# Patient Record
Sex: MALE | Race: WHITE | Employment: OTHER | URBAN - METROPOLITAN AREA
[De-identification: names, ages, dates, MRNs, and addresses within clinical notes are randomized per-mention and may not be internally consistent; named-entity substitution may affect disease eponyms.]

---

## 2017-01-12 DIAGNOSIS — F41.8 DEPRESSION WITH ANXIETY: ICD-10-CM

## 2017-01-12 RX ORDER — IMIPRAMINE HCL 25 MG
TABLET ORAL
Qty: 90 TABLET | Refills: 0 | Status: SHIPPED | OUTPATIENT
Start: 2017-01-12 | End: 2017-05-03 | Stop reason: SDUPTHER

## 2017-02-13 ENCOUNTER — TELEPHONE (OUTPATIENT)
Dept: CARDIOLOGY | Age: 72
End: 2017-02-13

## 2017-05-03 ENCOUNTER — OFFICE VISIT (OUTPATIENT)
Dept: FAMILY MEDICINE CLINIC | Age: 72
End: 2017-05-03

## 2017-05-03 VITALS
HEART RATE: 59 BPM | TEMPERATURE: 97.9 F | HEIGHT: 68 IN | WEIGHT: 165.2 LBS | RESPIRATION RATE: 16 BRPM | OXYGEN SATURATION: 98 % | BODY MASS INDEX: 25.04 KG/M2 | SYSTOLIC BLOOD PRESSURE: 110 MMHG | DIASTOLIC BLOOD PRESSURE: 70 MMHG

## 2017-05-03 DIAGNOSIS — K59.09 OTHER CONSTIPATION: ICD-10-CM

## 2017-05-03 DIAGNOSIS — R73.01 IMPAIRED FASTING GLUCOSE: ICD-10-CM

## 2017-05-03 DIAGNOSIS — I25.83 CORONARY ARTERY DISEASE DUE TO LIPID RICH PLAQUE: ICD-10-CM

## 2017-05-03 DIAGNOSIS — I25.10 CORONARY ARTERY DISEASE DUE TO LIPID RICH PLAQUE: ICD-10-CM

## 2017-05-03 DIAGNOSIS — I65.23 BILATERAL CAROTID ARTERY STENOSIS: ICD-10-CM

## 2017-05-03 DIAGNOSIS — Z96.659 S/P KNEE REPLACEMENT: ICD-10-CM

## 2017-05-03 DIAGNOSIS — F41.9 ANXIETY: ICD-10-CM

## 2017-05-03 DIAGNOSIS — Z13.0 SCREENING FOR DEFICIENCY ANEMIA: ICD-10-CM

## 2017-05-03 DIAGNOSIS — F33.0 MILD RECURRENT MAJOR DEPRESSION (HCC): ICD-10-CM

## 2017-05-03 DIAGNOSIS — Z13.29 SCREENING FOR THYROID DISORDER: ICD-10-CM

## 2017-05-03 DIAGNOSIS — N40.0 BENIGN NON-NODULAR PROSTATIC HYPERPLASIA WITHOUT LOWER URINARY TRACT SYMPTOMS: ICD-10-CM

## 2017-05-03 DIAGNOSIS — G20 PARKINSON DISEASE (HCC): ICD-10-CM

## 2017-05-03 DIAGNOSIS — K22.70 BARRETT'S ESOPHAGUS WITHOUT DYSPLASIA: ICD-10-CM

## 2017-05-03 DIAGNOSIS — R53.81 PHYSICAL DECONDITIONING: ICD-10-CM

## 2017-05-03 DIAGNOSIS — I10 ESSENTIAL HYPERTENSION: Primary | ICD-10-CM

## 2017-05-03 DIAGNOSIS — Z13.220 LIPID SCREENING: ICD-10-CM

## 2017-05-03 PROCEDURE — 3017F COLORECTAL CA SCREEN DOC REV: CPT | Performed by: FAMILY MEDICINE

## 2017-05-03 PROCEDURE — G8598 ASA/ANTIPLAT THER USED: HCPCS | Performed by: FAMILY MEDICINE

## 2017-05-03 PROCEDURE — 99214 OFFICE O/P EST MOD 30 MIN: CPT | Performed by: FAMILY MEDICINE

## 2017-05-03 PROCEDURE — 1123F ACP DISCUSS/DSCN MKR DOCD: CPT | Performed by: FAMILY MEDICINE

## 2017-05-03 PROCEDURE — G8427 DOCREV CUR MEDS BY ELIG CLIN: HCPCS | Performed by: FAMILY MEDICINE

## 2017-05-03 PROCEDURE — G8420 CALC BMI NORM PARAMETERS: HCPCS | Performed by: FAMILY MEDICINE

## 2017-05-03 PROCEDURE — 1036F TOBACCO NON-USER: CPT | Performed by: FAMILY MEDICINE

## 2017-05-03 PROCEDURE — 4040F PNEUMOC VAC/ADMIN/RCVD: CPT | Performed by: FAMILY MEDICINE

## 2017-05-03 RX ORDER — IMIPRAMINE HCL 25 MG
TABLET ORAL
Qty: 90 TABLET | Refills: 0 | Status: SHIPPED | OUTPATIENT
Start: 2017-05-03 | End: 2017-05-06 | Stop reason: SDUPTHER

## 2017-05-03 ASSESSMENT — ENCOUNTER SYMPTOMS
COUGH: 0
NAUSEA: 0
ANAL BLEEDING: 0
CHOKING: 0
BLOOD IN STOOL: 0
SHORTNESS OF BREATH: 0
VOMITING: 0
DIARRHEA: 0
CHEST TIGHTNESS: 0
RECTAL PAIN: 0
WHEEZING: 0
APNEA: 0
CONSTIPATION: 0
ABDOMINAL DISTENTION: 0
STRIDOR: 0
ABDOMINAL PAIN: 0

## 2017-05-05 DIAGNOSIS — Z13.29 SCREENING FOR THYROID DISORDER: ICD-10-CM

## 2017-05-05 DIAGNOSIS — Z13.220 LIPID SCREENING: ICD-10-CM

## 2017-05-05 DIAGNOSIS — Z13.0 SCREENING FOR DEFICIENCY ANEMIA: ICD-10-CM

## 2017-05-05 DIAGNOSIS — I10 ESSENTIAL HYPERTENSION: ICD-10-CM

## 2017-05-05 DIAGNOSIS — R73.01 IMPAIRED FASTING GLUCOSE: ICD-10-CM

## 2017-05-05 LAB
A/G RATIO: 1.8 (ref 1.1–2.2)
ALBUMIN SERPL-MCNC: 4.6 G/DL (ref 3.4–5)
ALP BLD-CCNC: 106 U/L (ref 40–129)
ALT SERPL-CCNC: <5 U/L (ref 10–40)
ANION GAP SERPL CALCULATED.3IONS-SCNC: 18 MMOL/L (ref 3–16)
AST SERPL-CCNC: 17 U/L (ref 15–37)
BILIRUB SERPL-MCNC: 0.4 MG/DL (ref 0–1)
BUN BLDV-MCNC: 21 MG/DL (ref 7–20)
CALCIUM SERPL-MCNC: 9.8 MG/DL (ref 8.3–10.6)
CHLORIDE BLD-SCNC: 100 MMOL/L (ref 99–110)
CHOLESTEROL, TOTAL: 122 MG/DL (ref 0–199)
CO2: 25 MMOL/L (ref 21–32)
CREAT SERPL-MCNC: 1 MG/DL (ref 0.8–1.3)
GFR AFRICAN AMERICAN: >60
GFR NON-AFRICAN AMERICAN: >60
GLOBULIN: 2.6 G/DL
GLUCOSE BLD-MCNC: 99 MG/DL (ref 70–99)
HCT VFR BLD CALC: 45 % (ref 40.5–52.5)
HDLC SERPL-MCNC: 69 MG/DL (ref 40–60)
HEMOGLOBIN: 14.6 G/DL (ref 13.5–17.5)
LDL CHOLESTEROL CALCULATED: 45 MG/DL
MCH RBC QN AUTO: 30.9 PG (ref 26–34)
MCHC RBC AUTO-ENTMCNC: 32.5 G/DL (ref 31–36)
MCV RBC AUTO: 94.9 FL (ref 80–100)
PDW BLD-RTO: 13.7 % (ref 12.4–15.4)
PLATELET # BLD: 210 K/UL (ref 135–450)
PMV BLD AUTO: 9.3 FL (ref 5–10.5)
POTASSIUM SERPL-SCNC: 4.6 MMOL/L (ref 3.5–5.1)
RBC # BLD: 4.74 M/UL (ref 4.2–5.9)
SODIUM BLD-SCNC: 143 MMOL/L (ref 136–145)
TOTAL PROTEIN: 7.2 G/DL (ref 6.4–8.2)
TRIGL SERPL-MCNC: 40 MG/DL (ref 0–150)
TSH SERPL DL<=0.05 MIU/L-ACNC: 3.65 UIU/ML (ref 0.27–4.2)
VLDLC SERPL CALC-MCNC: 8 MG/DL
WBC # BLD: 6.8 K/UL (ref 4–11)

## 2017-05-06 DIAGNOSIS — F33.0 MILD RECURRENT MAJOR DEPRESSION (HCC): ICD-10-CM

## 2017-05-06 DIAGNOSIS — F41.9 ANXIETY: ICD-10-CM

## 2017-05-06 LAB
ESTIMATED AVERAGE GLUCOSE: 108.3 MG/DL
HBA1C MFR BLD: 5.4 %

## 2017-05-07 RX ORDER — IMIPRAMINE HCL 25 MG
TABLET ORAL
Qty: 90 TABLET | Refills: 0 | Status: SHIPPED | OUTPATIENT
Start: 2017-05-07 | End: 2017-09-23 | Stop reason: SDUPTHER

## 2017-05-08 ENCOUNTER — HOSPITAL ENCOUNTER (OUTPATIENT)
Dept: VASCULAR LAB | Age: 72
Discharge: HOME | End: 2017-05-08
Attending: FAMILY MEDICINE | Admitting: FAMILY MEDICINE

## 2017-05-08 DIAGNOSIS — I65.29 STENOSIS OF CAROTID ARTERY, UNSPECIFIED LATERALITY: Primary | ICD-10-CM

## 2017-05-08 DIAGNOSIS — R73.01 IMPAIRED FASTING GLUCOSE: ICD-10-CM

## 2017-05-24 ENCOUNTER — OFFICE VISIT (OUTPATIENT)
Dept: CARDIOLOGY CLINIC | Age: 72
End: 2017-05-24

## 2017-05-24 VITALS
HEART RATE: 60 BPM | DIASTOLIC BLOOD PRESSURE: 60 MMHG | WEIGHT: 165 LBS | SYSTOLIC BLOOD PRESSURE: 100 MMHG | BODY MASS INDEX: 25.09 KG/M2

## 2017-05-24 DIAGNOSIS — I20.9 ANGINA PECTORIS (HCC): ICD-10-CM

## 2017-05-24 DIAGNOSIS — I25.10 CAD IN NATIVE ARTERY: Primary | ICD-10-CM

## 2017-05-24 PROCEDURE — 99214 OFFICE O/P EST MOD 30 MIN: CPT | Performed by: INTERNAL MEDICINE

## 2017-05-24 PROCEDURE — G8420 CALC BMI NORM PARAMETERS: HCPCS | Performed by: INTERNAL MEDICINE

## 2017-05-24 PROCEDURE — 4040F PNEUMOC VAC/ADMIN/RCVD: CPT | Performed by: INTERNAL MEDICINE

## 2017-05-24 PROCEDURE — G8427 DOCREV CUR MEDS BY ELIG CLIN: HCPCS | Performed by: INTERNAL MEDICINE

## 2017-05-24 PROCEDURE — 1123F ACP DISCUSS/DSCN MKR DOCD: CPT | Performed by: INTERNAL MEDICINE

## 2017-05-24 PROCEDURE — 1036F TOBACCO NON-USER: CPT | Performed by: INTERNAL MEDICINE

## 2017-05-24 PROCEDURE — G8598 ASA/ANTIPLAT THER USED: HCPCS | Performed by: INTERNAL MEDICINE

## 2017-05-24 PROCEDURE — 3017F COLORECTAL CA SCREEN DOC REV: CPT | Performed by: INTERNAL MEDICINE

## 2017-06-13 ENCOUNTER — OFFICE VISIT (OUTPATIENT)
Dept: FAMILY MEDICINE CLINIC | Age: 72
End: 2017-06-13

## 2017-06-13 ENCOUNTER — TELEPHONE (OUTPATIENT)
Dept: FAMILY MEDICINE CLINIC | Age: 72
End: 2017-06-13

## 2017-06-13 ENCOUNTER — TELEPHONE (OUTPATIENT)
Dept: CARDIOLOGY CLINIC | Age: 72
End: 2017-06-13

## 2017-06-13 VITALS
WEIGHT: 160 LBS | SYSTOLIC BLOOD PRESSURE: 126 MMHG | HEART RATE: 66 BPM | BODY MASS INDEX: 24.25 KG/M2 | TEMPERATURE: 97.7 F | OXYGEN SATURATION: 98 % | RESPIRATION RATE: 16 BRPM | DIASTOLIC BLOOD PRESSURE: 78 MMHG | HEIGHT: 68 IN

## 2017-06-13 DIAGNOSIS — N52.8 OTHER MALE ERECTILE DYSFUNCTION: Primary | ICD-10-CM

## 2017-06-13 DIAGNOSIS — I10 ESSENTIAL HYPERTENSION, BENIGN: ICD-10-CM

## 2017-06-13 DIAGNOSIS — E78.2 HYPERLIPIDEMIA, MIXED: ICD-10-CM

## 2017-06-13 DIAGNOSIS — I25.10 CAD IN NATIVE ARTERY: ICD-10-CM

## 2017-06-13 PROBLEM — N52.9 ERECTILE DYSFUNCTION: Status: ACTIVE | Noted: 2017-06-13

## 2017-06-13 PROCEDURE — 4040F PNEUMOC VAC/ADMIN/RCVD: CPT | Performed by: FAMILY MEDICINE

## 2017-06-13 PROCEDURE — 1036F TOBACCO NON-USER: CPT | Performed by: FAMILY MEDICINE

## 2017-06-13 PROCEDURE — G8420 CALC BMI NORM PARAMETERS: HCPCS | Performed by: FAMILY MEDICINE

## 2017-06-13 PROCEDURE — 3017F COLORECTAL CA SCREEN DOC REV: CPT | Performed by: FAMILY MEDICINE

## 2017-06-13 PROCEDURE — 1123F ACP DISCUSS/DSCN MKR DOCD: CPT | Performed by: FAMILY MEDICINE

## 2017-06-13 PROCEDURE — G8598 ASA/ANTIPLAT THER USED: HCPCS | Performed by: FAMILY MEDICINE

## 2017-06-13 PROCEDURE — 99214 OFFICE O/P EST MOD 30 MIN: CPT | Performed by: FAMILY MEDICINE

## 2017-06-13 PROCEDURE — G8427 DOCREV CUR MEDS BY ELIG CLIN: HCPCS | Performed by: FAMILY MEDICINE

## 2017-06-13 ASSESSMENT — ENCOUNTER SYMPTOMS
STRIDOR: 0
BLOOD IN STOOL: 0
SHORTNESS OF BREATH: 0
DIARRHEA: 0
COUGH: 0
ABDOMINAL PAIN: 0
CONSTIPATION: 0
ABDOMINAL DISTENTION: 0
ANAL BLEEDING: 0
CHOKING: 0
APNEA: 0
VOMITING: 0
NAUSEA: 0
WHEEZING: 0
RECTAL PAIN: 0
CHEST TIGHTNESS: 0

## 2017-06-15 RX ORDER — TADALAFIL 5 MG/1
5 TABLET ORAL PRN
Qty: 30 TABLET | Refills: 0 | Status: SHIPPED | OUTPATIENT
Start: 2017-06-15 | End: 2017-10-19 | Stop reason: ALTCHOICE

## 2017-07-03 RX ORDER — SIMVASTATIN 10 MG
TABLET ORAL
Qty: 90 TABLET | Refills: 3 | Status: SHIPPED | OUTPATIENT
Start: 2017-07-03 | End: 2018-08-18 | Stop reason: SDUPTHER

## 2017-07-21 ENCOUNTER — OFFICE VISIT (OUTPATIENT)
Dept: FAMILY MEDICINE CLINIC | Age: 72
End: 2017-07-21

## 2017-07-21 VITALS
RESPIRATION RATE: 16 BRPM | WEIGHT: 161.1 LBS | HEIGHT: 68 IN | BODY MASS INDEX: 24.41 KG/M2 | HEART RATE: 74 BPM | TEMPERATURE: 97.9 F | OXYGEN SATURATION: 98 % | DIASTOLIC BLOOD PRESSURE: 75 MMHG | SYSTOLIC BLOOD PRESSURE: 136 MMHG

## 2017-07-21 DIAGNOSIS — M79.652 PAIN OF LEFT THIGH: Primary | ICD-10-CM

## 2017-07-21 DIAGNOSIS — S79.922A THIGH INJURY, LEFT, INITIAL ENCOUNTER: ICD-10-CM

## 2017-07-21 PROBLEM — S79.929A THIGH INJURY: Status: ACTIVE | Noted: 2017-07-21

## 2017-07-21 PROCEDURE — 1123F ACP DISCUSS/DSCN MKR DOCD: CPT | Performed by: FAMILY MEDICINE

## 2017-07-21 PROCEDURE — G8420 CALC BMI NORM PARAMETERS: HCPCS | Performed by: FAMILY MEDICINE

## 2017-07-21 PROCEDURE — G8427 DOCREV CUR MEDS BY ELIG CLIN: HCPCS | Performed by: FAMILY MEDICINE

## 2017-07-21 PROCEDURE — 3017F COLORECTAL CA SCREEN DOC REV: CPT | Performed by: FAMILY MEDICINE

## 2017-07-21 PROCEDURE — 99214 OFFICE O/P EST MOD 30 MIN: CPT | Performed by: FAMILY MEDICINE

## 2017-07-21 PROCEDURE — 1036F TOBACCO NON-USER: CPT | Performed by: FAMILY MEDICINE

## 2017-07-21 PROCEDURE — G8598 ASA/ANTIPLAT THER USED: HCPCS | Performed by: FAMILY MEDICINE

## 2017-07-21 PROCEDURE — 4040F PNEUMOC VAC/ADMIN/RCVD: CPT | Performed by: FAMILY MEDICINE

## 2017-07-21 RX ORDER — NAPROXEN 500 MG/1
500 TABLET ORAL 2 TIMES DAILY WITH MEALS
Qty: 20 TABLET | Refills: 1 | Status: SHIPPED | OUTPATIENT
Start: 2017-07-21 | End: 2019-05-15 | Stop reason: CLARIF

## 2017-07-21 RX ORDER — TRAMADOL HYDROCHLORIDE 50 MG/1
50 TABLET ORAL EVERY 8 HOURS PRN
Qty: 15 TABLET | Refills: 0 | Status: SHIPPED | OUTPATIENT
Start: 2017-07-21 | End: 2020-06-18

## 2017-07-21 ASSESSMENT — ENCOUNTER SYMPTOMS
CONSTIPATION: 0
SHORTNESS OF BREATH: 0
STRIDOR: 0
BLOOD IN STOOL: 0
WHEEZING: 0
ANAL BLEEDING: 0
ABDOMINAL DISTENTION: 0
COUGH: 0
CHOKING: 0
APNEA: 0
CHEST TIGHTNESS: 0
NAUSEA: 0
VOMITING: 0
ABDOMINAL PAIN: 0
DIARRHEA: 0

## 2017-09-23 DIAGNOSIS — F41.9 ANXIETY: ICD-10-CM

## 2017-09-23 DIAGNOSIS — F33.0 MILD RECURRENT MAJOR DEPRESSION (HCC): ICD-10-CM

## 2017-09-24 RX ORDER — IMIPRAMINE HCL 25 MG
TABLET ORAL
Qty: 90 TABLET | Refills: 0 | Status: SHIPPED | OUTPATIENT
Start: 2017-09-24 | End: 2018-01-06 | Stop reason: SDUPTHER

## 2017-10-19 ENCOUNTER — OFFICE VISIT (OUTPATIENT)
Dept: FAMILY MEDICINE CLINIC | Age: 72
End: 2017-10-19

## 2017-10-19 VITALS
TEMPERATURE: 97.6 F | BODY MASS INDEX: 25.02 KG/M2 | DIASTOLIC BLOOD PRESSURE: 82 MMHG | WEIGHT: 165.1 LBS | HEIGHT: 68 IN | HEART RATE: 53 BPM | SYSTOLIC BLOOD PRESSURE: 134 MMHG | RESPIRATION RATE: 16 BRPM | OXYGEN SATURATION: 98 %

## 2017-10-19 DIAGNOSIS — F41.9 ANXIETY: ICD-10-CM

## 2017-10-19 DIAGNOSIS — N52.8 OTHER MALE ERECTILE DYSFUNCTION: ICD-10-CM

## 2017-10-19 DIAGNOSIS — F33.0 MILD RECURRENT MAJOR DEPRESSION (HCC): ICD-10-CM

## 2017-10-19 DIAGNOSIS — I10 ESSENTIAL HYPERTENSION, BENIGN: Primary | ICD-10-CM

## 2017-10-19 DIAGNOSIS — K21.9 GASTROESOPHAGEAL REFLUX DISEASE WITHOUT ESOPHAGITIS: ICD-10-CM

## 2017-10-19 DIAGNOSIS — I25.10 CORONARY ARTERY DISEASE DUE TO LIPID RICH PLAQUE: ICD-10-CM

## 2017-10-19 DIAGNOSIS — Z23 NEED FOR INFLUENZA VACCINATION: ICD-10-CM

## 2017-10-19 DIAGNOSIS — I25.83 CORONARY ARTERY DISEASE DUE TO LIPID RICH PLAQUE: ICD-10-CM

## 2017-10-19 PROCEDURE — 1036F TOBACCO NON-USER: CPT | Performed by: FAMILY MEDICINE

## 2017-10-19 PROCEDURE — 3017F COLORECTAL CA SCREEN DOC REV: CPT | Performed by: FAMILY MEDICINE

## 2017-10-19 PROCEDURE — G8484 FLU IMMUNIZE NO ADMIN: HCPCS | Performed by: FAMILY MEDICINE

## 2017-10-19 PROCEDURE — G8598 ASA/ANTIPLAT THER USED: HCPCS | Performed by: FAMILY MEDICINE

## 2017-10-19 PROCEDURE — 4040F PNEUMOC VAC/ADMIN/RCVD: CPT | Performed by: FAMILY MEDICINE

## 2017-10-19 PROCEDURE — G8427 DOCREV CUR MEDS BY ELIG CLIN: HCPCS | Performed by: FAMILY MEDICINE

## 2017-10-19 PROCEDURE — G8417 CALC BMI ABV UP PARAM F/U: HCPCS | Performed by: FAMILY MEDICINE

## 2017-10-19 PROCEDURE — G0008 ADMIN INFLUENZA VIRUS VAC: HCPCS | Performed by: FAMILY MEDICINE

## 2017-10-19 PROCEDURE — 1123F ACP DISCUSS/DSCN MKR DOCD: CPT | Performed by: FAMILY MEDICINE

## 2017-10-19 PROCEDURE — 99214 OFFICE O/P EST MOD 30 MIN: CPT | Performed by: FAMILY MEDICINE

## 2017-10-19 PROCEDURE — 90662 IIV NO PRSV INCREASED AG IM: CPT | Performed by: FAMILY MEDICINE

## 2017-10-19 RX ORDER — TADALAFIL 10 MG/1
10 TABLET ORAL PRN
Qty: 90 TABLET | Refills: 0 | Status: SHIPPED | OUTPATIENT
Start: 2017-10-19 | End: 2019-05-15 | Stop reason: CLARIF

## 2017-10-19 ASSESSMENT — ENCOUNTER SYMPTOMS
ANAL BLEEDING: 0
CHEST TIGHTNESS: 0
CONSTIPATION: 0
STRIDOR: 0
VOMITING: 0
ABDOMINAL DISTENTION: 0
WHEEZING: 0
SHORTNESS OF BREATH: 0
RECTAL PAIN: 0
COUGH: 0
ABDOMINAL PAIN: 0
NAUSEA: 0
BLOOD IN STOOL: 0
APNEA: 0
CHOKING: 0
DIARRHEA: 0

## 2017-10-19 NOTE — PATIENT INSTRUCTIONS
Patient Education        High Blood Pressure: Care Instructions  Your Care Instructions  If your blood pressure is usually above 140/90, you have high blood pressure, or hypertension. That means the top number is 140 or higher or the bottom number is 90 or higher, or both. Despite what a lot of people think, high blood pressure usually doesn't cause headaches or make you feel dizzy or lightheaded. It usually has no symptoms. But it does increase your risk for heart attack, stroke, and kidney or eye damage. The higher your blood pressure, the more your risk increases. Your doctor will give you a goal for your blood pressure. Your goal will be based on your health and your age. An example of a goal is to keep your blood pressure below 140/90. Lifestyle changes, such as eating healthy and being active, are always important to help lower blood pressure. You might also take medicine to reach your blood pressure goal.  Follow-up care is a key part of your treatment and safety. Be sure to make and go to all appointments, and call your doctor if you are having problems. It's also a good idea to know your test results and keep a list of the medicines you take. How can you care for yourself at home? Medical treatment  · If you stop taking your medicine, your blood pressure will go back up. You may take one or more types of medicine to lower your blood pressure. Be safe with medicines. Take your medicine exactly as prescribed. Call your doctor if you think you are having a problem with your medicine. · Talk to your doctor before you start taking aspirin every day. Aspirin can help certain people lower their risk of a heart attack or stroke. But taking aspirin isn't right for everyone, because it can cause serious bleeding. · See your doctor regularly. You may need to see the doctor more often at first or until your blood pressure comes down.   · If you are taking blood pressure medicine, talk to your doctor before you arms.  ¨ Lightheadedness or sudden weakness. ¨ A fast or irregular heartbeat. · You have symptoms of a stroke. These may include:  ¨ Sudden numbness, tingling, weakness, or loss of movement in your face, arm, or leg, especially on only one side of your body. ¨ Sudden vision changes. ¨ Sudden trouble speaking. ¨ Sudden confusion or trouble understanding simple statements. ¨ Sudden problems with walking or balance. ¨ A sudden, severe headache that is different from past headaches. · You have severe back or belly pain. Do not wait until your blood pressure comes down on its own. Get help right away. Call your doctor now or seek immediate care if:  · Your blood pressure is much higher than normal (such as 180/110 or higher), but you don't have symptoms. · You think high blood pressure is causing symptoms, such as:  ¨ Severe headache. ¨ Blurry vision. Watch closely for changes in your health, and be sure to contact your doctor if:  · Your blood pressure measures 140/90 or higher at least 2 times. That means the top number is 140 or higher or the bottom number is 90 or higher, or both. · You think you may be having side effects from your blood pressure medicine. · Your blood pressure is usually normal, but it goes above normal at least 2 times. Where can you learn more? Go to [email or web address]://[email or web address]. org and sign in to your [employer] account. Enter L349 in the [employer] box to learn more about \"High Blood Pressure: Care Instructions. \"     If you do not have an account, please click on the \"Sign Up Now\" link. Current as of: August 8, 2016  Content Version: 11.3  © 1124-2788 [employer]. Care instructions adapted under license by [facility] ([facility]). If you have questions about a medical condition or this instruction, always ask your healthcare professional. [address] any warranty or liability for your use of this information.

## 2017-10-19 NOTE — PROGRESS NOTES
Subjective:      Patient ID: Corine Chakraborty is a 67 y.o. male. HPI         Erectile Dysfunction:mild-moderate f/u: taking cialis 5mg w/o side effects. Med helps slightly:thinks he needs stronger dose. No new associated concerns. Denies cp/sob/dizziness/penile pain-discharge/bleeding or lesions. HTN f/u:   Doing well. Associated w/nothing else new. Worsened by nothing else new. Improves w/current med. Adds salt to food at the table:Never does. Denies cp/sob/pnd/ankle edema/dizziness. Hyperlipidemia:doing well. Labs due next month. Has good diet regime. No new associated concerns. GERD f/u:mild:Doing well. No new associated/worsening or other improving factors. Denies abdo pain/n-v/diarrhea/melena-blood in stool. Depression with anxiety f/u;doing well. Taking med w/o side effects. No new associated or worsening factors. Denies SI/HI/new sleep problem/hallucinations/anxiety/nervousness/appetite changes/illicit drugs/etoh abuse. CAD:per cards. Doing well. No new associated concerns. Allergies   Allergen Reactions    Levofloxacin Swelling     lips swell         Current Outpatient Prescriptions:     imipramine (TOFRANIL) 25 MG tablet, Take 1 tablet by mouth  nightly, Disp: 90 tablet, Rfl: 0    RIVASTIGMINE TD, Place onto the skin, Disp: , Rfl:     naproxen (NAPROSYN) 500 MG tablet, Take 1 tablet by mouth 2 times daily (with meals), Disp: 20 tablet, Rfl: 1    traMADol (ULTRAM) 50 MG tablet, Take 1 tablet by mouth every 8 hours as needed for Pain May cause drowsiness. May impair ability to operate vehicles or machinery.  Do not use in combination with alcohol, Disp: 15 tablet, Rfl: 0    simvastatin (ZOCOR) 10 MG tablet, Take 1 tablet by mouth  nightly, Disp: 90 tablet, Rfl: 3    tadalafil (CIALIS) 5 MG tablet, Take 1 tablet by mouth as needed for Erectile Dysfunction, Disp: 30 tablet, Rfl: 0    Amantadine (SYMMETREL) 100 MG TABS tablet, Take 100 mg by mouth 2 times daily, Disp: , Rfl:     nitroGLYCERIN (NITROSTAT) 0.4 MG SL tablet, Place 1 tablet under the tongue every 5 minutes as needed for Chest pain, Disp: 25 tablet, Rfl: 1    linaclotide (LINZESS) 290 MCG CAPS capsule, Take 290 mcg by mouth every morning (before breakfast), Disp: , Rfl:     clonazePAM (KLONOPIN) 0.5 MG tablet, Take 1 mg by mouth nightly , Disp: , Rfl:     celecoxib (CELEBREX) 200 MG capsule, Take 200 mg by mouth daily as needed for Pain, Disp: , Rfl:     carbidopa-levodopa (SINEMET)  MG per tablet, Take by mouth Take 3 tablets at 0800, 1300 1700 and one tablet at 2300, Disp: , Rfl:     Cholecalciferol (VITAMIN D3) 2000 UNITS CAPS,  Take 1 capsule by mouth daily , Disp: , Rfl:     aspirin 81 MG EC tablet,  Take 81 mg by mouth three times a week On Mondays, Wednesdays and Fridays, Disp: , Rfl:     Polyethylene Glycol 3350 (MIRALAX PO),  Take 17 g by mouth daily as needed , Disp: , Rfl:     pantoprazole (PROTONIX) 40 MG tablet,  Take 40 mg by mouth daily , Disp: , Rfl:     fish oil-omega-3 fatty acids (FISH OIL) 1000 MG capsule,  Take 1 g by mouth daily , Disp: , Rfl:       Past Medical History:   Diagnosis Date    Anxiety     PCP in [place]:Dr. Shy Bolanos.    Arthritis     Arthritis of hand, right 6/20/2012    Sneed esophagus     Dr. Colton Amaya. EGD:1/17/2012. Next EGD due in 3yrs=1/2015    Sneed esophagus     Under care of GI    Bilateral carotid artery stenosis <50% 4/30/2014    BPH (benign prostatic hypertrophy)     Dr. Luigi Friedman. PSA per urology.     CAD (coronary artery disease)     under care of cards:Dr. Montoya Sensing    Carotid stenosis     Chest pain     pt states he no chest pain in 5 yrs, panic attack    Chronic back pain 1/26/2012    Chronic back pain     under care of ortho spine:Dr. Rex Sevilla    Constipation 8/6/2013    Degenerative arthritis of thoracic spine 1/26/2012    Degenerative cervical disc 1/26/2012    Depression with anxiety 11/15/2011    Klonopin per discharge, enuresis, frequency, genital sores, hematuria, penile pain, penile swelling, scrotal swelling and testicular pain. Skin: Negative for rash. Neurological: Negative for dizziness, weakness and light-headedness. Hematological: Negative for adenopathy. Psychiatric/Behavioral: Negative for agitation, behavioral problems, confusion, decreased concentration, dysphoric mood, hallucinations, self-injury, sleep disturbance and suicidal ideas. The patient is not nervous/anxious and is not hyperactive. Objective:   Physical Exam   Constitutional: He is oriented to person, place, and time. Vital signs are normal. He appears well-developed and well-nourished. He is cooperative. He does not have a sickly appearance. No distress. HENT:   Nose: Nose normal.   Mouth/Throat: Uvula is midline, oropharynx is clear and moist and mucous membranes are normal.   Hearing intact to nml conversation   Eyes: Conjunctivae, EOM and lids are normal. Pupils are equal, round, and reactive to light. Neck: Trachea normal and normal range of motion. Neck supple. Carotid bruit is not present. Cardiovascular: Normal rate, regular rhythm, normal heart sounds, intact distal pulses and normal pulses. No ankle edema. Pulmonary/Chest: Effort normal and breath sounds normal.   CTAB,good AE bilaterally   Abdominal: Soft. Normal appearance and bowel sounds are normal. He exhibits no distension and no mass. There is no hepatomegaly. There is no tenderness. Genitourinary:   Genitourinary Comments: Deferred by pt'. Lymphadenopathy:     He has no cervical adenopathy. Neurological: He is alert and oriented to person, place, and time. Skin: Skin is warm, dry and intact. No rash noted. He is not diaphoretic. No cyanosis. No pallor. Good skin turgor. Capillary refill=2-3 secs. Psychiatric: He has a normal mood and affect.  His speech is normal and behavior is normal. Judgment and thought content normal. His mood appears not anxious. His affect is not angry, not blunt, not labile and not inappropriate. He is not agitated, not aggressive, not hyperactive, not slowed, not withdrawn, not actively hallucinating and not combative. Thought content is not paranoid and not delusional. Cognition and memory are normal. He does not exhibit a depressed mood. He expresses no homicidal and no suicidal ideation. Good eye contact. He is attentive. Assessment:        1. Essential hypertension, benign  VSS/well appearing. Stable. Continue same tx plan. Low salt diet advised. 2. Erectile dysfunction  Not well controlled. Increase med to 10mg prn. Pt' states he does not take nitrates & has none at home. Has not needed nitrates per pt'. Is aware to not take cialis if he takes nitrates. tadalafil (CIALIS) 10 MG tablet   3. Mild recurrent major depression (HCC)  Stable. 4. Gastroesophageal reflux disease without esophagitis  Stable. 5. Anxiety  Stable. 6. CAD  Stable. Per cards. 7. Need for influenza vaccination  Vaccine Counseling:Risks and benefits of vaccines discussed with patient and questions answered. INFLUENZA, HIGH DOSE, 65 YRS +, IM, PF, PREFILL SYR, 0.5ML (FLUZONE HD)             Plan:     Advised to go to local ER or call 911 for any worrisome signs/sx. Obtain labs/diagnostic tests as discussed today & call back for results within 2-7days. Pt' left office in good condition.

## 2017-11-08 ENCOUNTER — OFFICE VISIT (OUTPATIENT)
Dept: CARDIOLOGY CLINIC | Age: 72
End: 2017-11-08

## 2017-11-08 VITALS
BODY MASS INDEX: 25.09 KG/M2 | WEIGHT: 165 LBS | DIASTOLIC BLOOD PRESSURE: 80 MMHG | HEART RATE: 60 BPM | SYSTOLIC BLOOD PRESSURE: 132 MMHG

## 2017-11-08 DIAGNOSIS — I10 ESSENTIAL HYPERTENSION: ICD-10-CM

## 2017-11-08 DIAGNOSIS — I20.9 ANGINA PECTORIS (HCC): ICD-10-CM

## 2017-11-08 DIAGNOSIS — I25.10 CAD IN NATIVE ARTERY: Primary | ICD-10-CM

## 2017-11-08 PROCEDURE — 4040F PNEUMOC VAC/ADMIN/RCVD: CPT | Performed by: INTERNAL MEDICINE

## 2017-11-08 PROCEDURE — 99214 OFFICE O/P EST MOD 30 MIN: CPT | Performed by: INTERNAL MEDICINE

## 2017-11-08 PROCEDURE — 1123F ACP DISCUSS/DSCN MKR DOCD: CPT | Performed by: INTERNAL MEDICINE

## 2017-11-08 PROCEDURE — 1036F TOBACCO NON-USER: CPT | Performed by: INTERNAL MEDICINE

## 2017-11-08 PROCEDURE — G8598 ASA/ANTIPLAT THER USED: HCPCS | Performed by: INTERNAL MEDICINE

## 2017-11-08 PROCEDURE — G8427 DOCREV CUR MEDS BY ELIG CLIN: HCPCS | Performed by: INTERNAL MEDICINE

## 2017-11-08 PROCEDURE — G8417 CALC BMI ABV UP PARAM F/U: HCPCS | Performed by: INTERNAL MEDICINE

## 2017-11-08 PROCEDURE — 3017F COLORECTAL CA SCREEN DOC REV: CPT | Performed by: INTERNAL MEDICINE

## 2017-11-08 PROCEDURE — G8484 FLU IMMUNIZE NO ADMIN: HCPCS | Performed by: INTERNAL MEDICINE

## 2017-11-08 NOTE — PROGRESS NOTES
Subjective:      Patient ID: Valeria Kawasaki is a 67 y.o. male. HPI Alda Starkey is being seen for a 6 month follow up CAD/angina/HTN. Doing well. Exercising. Taking up boxing. Active. No chest pain/sob. Not tachycardia. No syncope. No pnd or orthopnea. No edema. Wt stable. Feeling good. Past Medical History:   Diagnosis Date    Anxiety     PCP in [place]:Dr. Quynh Melara.    Arthritis     Arthritis of hand, right 6/20/2012    Sneed esophagus     Dr. Candy Conde. EGD:1/17/2012. Next EGD due in 3yrs=1/2015    Sneed esophagus     Under care of GI    Bilateral carotid artery stenosis <50% 4/30/2014    BPH (benign prostatic hypertrophy)     Dr. Sabina Alas. PSA per urology.     CAD (coronary artery disease)     under care of cards:Dr. Elda Parks    Carotid stenosis     Chest pain     pt states he no chest pain in 5 yrs, panic attack    Chronic back pain 1/26/2012    Chronic back pain     under care of ortho spine:Dr. Sweeney    Constipation 8/6/2013    Degenerative arthritis of thoracic spine 1/26/2012    Degenerative cervical disc 1/26/2012    Depression with anxiety 11/15/2011    Klonopin per neurologist(Dr. Susana Mccord)    Diverticulosis 1/17/2012    colonoscopy    Elevated PSA     8/21/14;5/2013:under care of Dr. Beltran/[place]:[facility]rology group    Erectile dysfunction 6/13/2017    Essential hypertension, benign 6/20/2012    cardiologist:Dr. Rebecca Keyes    GERD (gastroesophageal reflux disease)     Hemorrhoid     Hiatal hernia     small    Hyperlipidemia     Impaired fasting glucose 5/19/2014    Osteopenia per CXR 5/19/2014    Parkinson disease ([facility] [address]     Dr. Connie Kramer Neurology    Renal stone 4/2012    4 mm distal left ureteral calculus:Dr. Beltran:urologist    RLS (restless legs syndrome)     S/P colonoscopy 2011    Dr. Rina Espinal per pt' next is in 10yrs-2021    S/P endoscopy 1/2012    Dr. Nereida Monreal acute changes:+Barrets:next in 3yrs 1/2015    Sigmoidoscopy exam 1/17/2012     Mangles:No acute changes. Next in 5yrs=1/2017    Therapeutic drug monitoring 5/14/15    Consistent OARRS report on 5/14/15;8/4/15    Thoracic spondylosis     under care of ortho spine:Dr. Alfie Dsouza    Venous insufficiency of leg     Vocal cord paresis 5/11/2016    under care of ENT:Dr. Ulises Guzman. s/p vocal cord augmentation 6/2016 at [facility] hosp     Past Surgical History:   Procedure Laterality Date    CATARACT REMOVAL      COLONOSCOPY  2002;2011    JOINT REPLACEMENT Right 02/23/2017    Right TKR(knee)    KNEE ARTHROPLASTY Left 7/30/13    LEFT TOTAL KNEE ARTHROPLASTY              KNEE CARTILAGE SURGERY      Left x 2, right x1    LITHOTRIPSY      Kidney stone removal as per urology:Stent removed after 10days    OSTEOTOMY Left     TURP N/A [id number]    TRANSURETHRAL RESECTION OF PROSTATE    UPPER GASTROINTESTINAL ENDOSCOPY  5/07       Allergies   Allergen Reactions    Levofloxacin Swelling     lips swell        Social History     Social History    Marital status:      Spouse name: N/A    Number of children: 2    Years of education: N/A     Occupational History    retired       Social History Main Topics    Smoking status: Never Smoker    Smokeless tobacco: Never Used    Alcohol use 0.0 oz/week      Comment: occasionally    Drug use: No    Sexual activity: Yes     Partners: Female     Other Topics Concern    Not on file     Social History Narrative    No narrative on file        Patient has a family history includes Alcohol Abuse in his father; Cancer (age of onset: 68) in his sister; Coronary Art Dis (age of onset: 77) in his brother; Coronary Art Dis (age of onset: 80) in his mother; Heart Disease in his sister; Kidney Disease (age of onset: 61) in his father. Patient  has a past medical history of Anxiety; Arthritis; Arthritis of hand, right; Sneed esophagus; Sneed esophagus;  Bilateral carotid artery stenosis <50%; BPH (benign prostatic hypertrophy); CAD (coronary artery aspirin 81 MG EC tablet   Take 81 mg by mouth three times a week On Mondays, Wednesdays and Fridays      Polyethylene Glycol 3350 (MIRALAX PO)   Take 17 g by mouth daily as needed       pantoprazole (PROTONIX) 40 MG tablet   Take 40 mg by mouth daily       fish oil-omega-3 fatty acids (FISH OIL) 1000 MG capsule   Take 1 g by mouth daily        No current facility-administered medications for this visit. Vitals  Weight: 115 lb (52.2 kg)  Vitals:    11/08/17 0919   BP: 132/80   Pulse: 60                   Review of Systems   Constitutional: Negative for activity change and fatigue. Respiratory: Negative for apnea, cough, choking, Has chest tightness and shortness of breath. Cardiovascular: Negative for chest pain, palpitations and leg swelling. No PND or orthopnea. No tachycardia. Gastrointestinal: Negative for abdominal distention. Musculoskeletal: Negative for myalgias. Neurological: Negative for light-headedness. Negative for dizziness and syncope. Psychiatric/Behavioral: Negative for behavioral problems, confusion and agitation. Other systems reviewed negative as done. Objective:   Physical Exam   Constitutional: He is oriented to person, place, and time. He appears well-developed and well-nourished. No distress. HENT:   Head: Normocephalic and atraumatic. Eyes: Conjunctivae and EOM are normal. Right eye exhibits no discharge. Left eye exhibits no discharge. Neck: Normal range of motion. Neck supple. No JVD present. Cardiovascular: Normal rate, regular rhythm, S1 normal, S2 normal and normal heart sounds. Exam reveals no gallop. No murmur heard. Pulses:       Radial pulses are 2+ on the right side, and 2+ on the left side. Pulmonary/Chest: Effort normal and breath sounds normal. No respiratory distress. He has no wheezes. He has no rales. Abdominal: Soft. Bowel sounds are normal. There is no tenderness. Musculoskeletal: Normal range of motion.  He exhibits no edema. Neurological: He is alert and oriented to person, place, and time. Skin: Skin is warm and dry. Psychiatric: He has a normal mood and affect. His behavior is normal. Thought content normal.       Assessment:      1. CAD in native artery     2. Angina pectoris ([address]     3. Essential hypertension                Plan:      CV stable. BP good. No chest pain/angina. Exercising. LIpids per PCP. Reviewed previous records and testing including cath 8/15. Follow up 6 months.

## 2017-11-17 DIAGNOSIS — E78.2 HYPERLIPIDEMIA, MIXED: ICD-10-CM

## 2017-11-17 DIAGNOSIS — N40.0 BENIGN PROSTATIC HYPERPLASIA WITHOUT LOWER URINARY TRACT SYMPTOMS: ICD-10-CM

## 2017-11-17 DIAGNOSIS — I10 ESSENTIAL HYPERTENSION, BENIGN: ICD-10-CM

## 2017-11-17 DIAGNOSIS — I25.10 CAD IN NATIVE ARTERY: ICD-10-CM

## 2017-11-17 DIAGNOSIS — N52.8 OTHER MALE ERECTILE DYSFUNCTION: Primary | ICD-10-CM

## 2017-11-17 LAB
A/G RATIO: 2 (ref 1.1–2.2)
ALBUMIN SERPL-MCNC: 4.7 G/DL (ref 3.4–5)
ALP BLD-CCNC: 92 U/L (ref 40–129)
ALT SERPL-CCNC: 19 U/L (ref 10–40)
ANION GAP SERPL CALCULATED.3IONS-SCNC: 12 MMOL/L (ref 3–16)
AST SERPL-CCNC: 21 U/L (ref 15–37)
BILIRUB SERPL-MCNC: 0.7 MG/DL (ref 0–1)
BUN BLDV-MCNC: 22 MG/DL (ref 7–20)
CALCIUM SERPL-MCNC: 9.7 MG/DL (ref 8.3–10.6)
CHLORIDE BLD-SCNC: 101 MMOL/L (ref 99–110)
CHOLESTEROL, TOTAL: 130 MG/DL (ref 0–199)
CO2: 29 MMOL/L (ref 21–32)
CREAT SERPL-MCNC: 0.9 MG/DL (ref 0.8–1.3)
GFR AFRICAN AMERICAN: >60
GFR NON-AFRICAN AMERICAN: >60
GLOBULIN: 2.4 G/DL
GLUCOSE BLD-MCNC: 98 MG/DL (ref 70–99)
HDLC SERPL-MCNC: 68 MG/DL (ref 40–60)
LDL CHOLESTEROL CALCULATED: 53 MG/DL
POTASSIUM SERPL-SCNC: 4.4 MMOL/L (ref 3.5–5.1)
SODIUM BLD-SCNC: 142 MMOL/L (ref 136–145)
TOTAL PROTEIN: 7.1 G/DL (ref 6.4–8.2)
TRIGL SERPL-MCNC: 47 MG/DL (ref 0–150)
VLDLC SERPL CALC-MCNC: 9 MG/DL

## 2017-11-21 LAB
SEX HORMONE BINDING GLOBULIN: 77 NMOL/L (ref 11–80)
TESTOSTERONE FREE-NONMALE: 67.2 PG/ML (ref 47–244)
TESTOSTERONE TOTAL: 577 NG/DL (ref 220–1000)

## 2018-01-06 DIAGNOSIS — F33.0 MILD RECURRENT MAJOR DEPRESSION (HCC): ICD-10-CM

## 2018-01-06 DIAGNOSIS — F41.9 ANXIETY: ICD-10-CM

## 2018-01-07 RX ORDER — IMIPRAMINE HCL 25 MG
TABLET ORAL
Qty: 90 TABLET | Refills: 0 | Status: SHIPPED | OUTPATIENT
Start: 2018-01-07 | End: 2018-04-09 | Stop reason: SDUPTHER

## 2018-04-09 DIAGNOSIS — F41.9 ANXIETY: ICD-10-CM

## 2018-04-09 DIAGNOSIS — F33.0 MILD RECURRENT MAJOR DEPRESSION (HCC): ICD-10-CM

## 2018-04-09 RX ORDER — IMIPRAMINE HCL 25 MG
TABLET ORAL
Qty: 90 TABLET | Refills: 0 | Status: SHIPPED | OUTPATIENT
Start: 2018-04-09 | End: 2018-07-16 | Stop reason: SDUPTHER

## 2018-05-10 ENCOUNTER — OFFICE VISIT (OUTPATIENT)
Dept: CARDIOLOGY CLINIC | Age: 73
End: 2018-05-10

## 2018-05-10 VITALS
HEART RATE: 60 BPM | DIASTOLIC BLOOD PRESSURE: 78 MMHG | WEIGHT: 164 LBS | OXYGEN SATURATION: 98 % | BODY MASS INDEX: 24.94 KG/M2 | SYSTOLIC BLOOD PRESSURE: 124 MMHG

## 2018-05-10 DIAGNOSIS — I25.10 CAD IN NATIVE ARTERY: Primary | ICD-10-CM

## 2018-05-10 DIAGNOSIS — I20.9 ANGINA PECTORIS (HCC): ICD-10-CM

## 2018-05-10 DIAGNOSIS — I10 ESSENTIAL HYPERTENSION: ICD-10-CM

## 2018-05-10 PROCEDURE — G8420 CALC BMI NORM PARAMETERS: HCPCS | Performed by: INTERNAL MEDICINE

## 2018-05-10 PROCEDURE — G8427 DOCREV CUR MEDS BY ELIG CLIN: HCPCS | Performed by: INTERNAL MEDICINE

## 2018-05-10 PROCEDURE — 4040F PNEUMOC VAC/ADMIN/RCVD: CPT | Performed by: INTERNAL MEDICINE

## 2018-05-10 PROCEDURE — 99214 OFFICE O/P EST MOD 30 MIN: CPT | Performed by: INTERNAL MEDICINE

## 2018-05-10 PROCEDURE — G8598 ASA/ANTIPLAT THER USED: HCPCS | Performed by: INTERNAL MEDICINE

## 2018-05-10 PROCEDURE — 3017F COLORECTAL CA SCREEN DOC REV: CPT | Performed by: INTERNAL MEDICINE

## 2018-05-10 PROCEDURE — 1036F TOBACCO NON-USER: CPT | Performed by: INTERNAL MEDICINE

## 2018-05-10 PROCEDURE — 1123F ACP DISCUSS/DSCN MKR DOCD: CPT | Performed by: INTERNAL MEDICINE

## 2018-05-23 ENCOUNTER — OFFICE VISIT (OUTPATIENT)
Dept: FAMILY MEDICINE CLINIC | Age: 73
End: 2018-05-23

## 2018-05-23 VITALS
OXYGEN SATURATION: 98 % | RESPIRATION RATE: 16 BRPM | SYSTOLIC BLOOD PRESSURE: 121 MMHG | WEIGHT: 164.8 LBS | BODY MASS INDEX: 24.98 KG/M2 | TEMPERATURE: 97.6 F | HEART RATE: 75 BPM | DIASTOLIC BLOOD PRESSURE: 82 MMHG | HEIGHT: 68 IN

## 2018-05-23 DIAGNOSIS — F41.9 ANXIETY: ICD-10-CM

## 2018-05-23 DIAGNOSIS — F33.0 MILD RECURRENT MAJOR DEPRESSION (HCC): ICD-10-CM

## 2018-05-23 DIAGNOSIS — E78.2 HYPERLIPIDEMIA, MIXED: ICD-10-CM

## 2018-05-23 DIAGNOSIS — G89.29 CHRONIC BILATERAL LOW BACK PAIN WITHOUT SCIATICA: ICD-10-CM

## 2018-05-23 DIAGNOSIS — K22.70 BARRETT'S ESOPHAGUS WITHOUT DYSPLASIA: ICD-10-CM

## 2018-05-23 DIAGNOSIS — I25.83 CORONARY ARTERY DISEASE DUE TO LIPID RICH PLAQUE: ICD-10-CM

## 2018-05-23 DIAGNOSIS — I25.10 CORONARY ARTERY DISEASE DUE TO LIPID RICH PLAQUE: ICD-10-CM

## 2018-05-23 DIAGNOSIS — M54.50 CHRONIC BILATERAL LOW BACK PAIN WITHOUT SCIATICA: ICD-10-CM

## 2018-05-23 DIAGNOSIS — Z12.11 COLON CANCER SCREENING: ICD-10-CM

## 2018-05-23 DIAGNOSIS — N52.8 OTHER MALE ERECTILE DYSFUNCTION: ICD-10-CM

## 2018-05-23 DIAGNOSIS — N40.0 BENIGN PROSTATIC HYPERPLASIA WITHOUT LOWER URINARY TRACT SYMPTOMS: ICD-10-CM

## 2018-05-23 DIAGNOSIS — I10 ESSENTIAL HYPERTENSION: ICD-10-CM

## 2018-05-23 DIAGNOSIS — R73.01 IMPAIRED FASTING GLUCOSE: ICD-10-CM

## 2018-05-23 DIAGNOSIS — Z00.00 ROUTINE GENERAL MEDICAL EXAMINATION AT A HEALTH CARE FACILITY: Primary | ICD-10-CM

## 2018-05-23 DIAGNOSIS — Z23 NEED FOR ZOSTER VACCINE: ICD-10-CM

## 2018-05-23 DIAGNOSIS — R97.20 ELEVATED PSA: ICD-10-CM

## 2018-05-23 DIAGNOSIS — G20 PARKINSON DISEASE (HCC): ICD-10-CM

## 2018-05-23 LAB
A/G RATIO: 2 (ref 1.1–2.2)
ALBUMIN SERPL-MCNC: 4.5 G/DL (ref 3.4–5)
ALP BLD-CCNC: 74 U/L (ref 40–129)
ALT SERPL-CCNC: 8 U/L (ref 10–40)
ANION GAP SERPL CALCULATED.3IONS-SCNC: 14 MMOL/L (ref 3–16)
AST SERPL-CCNC: 23 U/L (ref 15–37)
BILIRUB SERPL-MCNC: 0.7 MG/DL (ref 0–1)
BUN BLDV-MCNC: 24 MG/DL (ref 7–20)
CALCIUM SERPL-MCNC: 9.4 MG/DL (ref 8.3–10.6)
CHLORIDE BLD-SCNC: 100 MMOL/L (ref 99–110)
CHOLESTEROL, TOTAL: 123 MG/DL (ref 0–199)
CO2: 27 MMOL/L (ref 21–32)
CREAT SERPL-MCNC: 1 MG/DL (ref 0.8–1.3)
GFR AFRICAN AMERICAN: >60
GFR NON-AFRICAN AMERICAN: >60
GLOBULIN: 2.3 G/DL
GLUCOSE BLD-MCNC: 93 MG/DL (ref 70–99)
HDLC SERPL-MCNC: 66 MG/DL (ref 40–60)
HEMOCCULT STL QL: NEGATIVE
HEMOCCULT STL QL: NORMAL
HEMOCCULT STL QL: NORMAL
LDL CHOLESTEROL CALCULATED: 49 MG/DL
POTASSIUM SERPL-SCNC: 4 MMOL/L (ref 3.5–5.1)
SODIUM BLD-SCNC: 141 MMOL/L (ref 136–145)
TOTAL PROTEIN: 6.8 G/DL (ref 6.4–8.2)
TRIGL SERPL-MCNC: 42 MG/DL (ref 0–150)
VLDLC SERPL CALC-MCNC: 8 MG/DL

## 2018-05-23 PROCEDURE — 4040F PNEUMOC VAC/ADMIN/RCVD: CPT | Performed by: FAMILY MEDICINE

## 2018-05-23 PROCEDURE — 82270 OCCULT BLOOD FECES: CPT | Performed by: FAMILY MEDICINE

## 2018-05-23 PROCEDURE — G0439 PPPS, SUBSEQ VISIT: HCPCS | Performed by: FAMILY MEDICINE

## 2018-05-23 PROCEDURE — G8598 ASA/ANTIPLAT THER USED: HCPCS | Performed by: FAMILY MEDICINE

## 2018-05-23 ASSESSMENT — ENCOUNTER SYMPTOMS
EYE PAIN: 0
COLOR CHANGE: 0
RHINORRHEA: 0
PHOTOPHOBIA: 0
SORE THROAT: 0
TROUBLE SWALLOWING: 0
EYE ITCHING: 0
VOICE CHANGE: 0
EYE REDNESS: 0
VOMITING: 0
SINUS PRESSURE: 0
SHORTNESS OF BREATH: 0
BACK PAIN: 1
STRIDOR: 0
NAUSEA: 0
CHOKING: 0
APNEA: 0
FACIAL SWELLING: 0
ANAL BLEEDING: 0
RECTAL PAIN: 0
BLOOD IN STOOL: 0
COUGH: 0
CONSTIPATION: 0
ABDOMINAL PAIN: 0
DIARRHEA: 0
CHEST TIGHTNESS: 0
WHEEZING: 0
ABDOMINAL DISTENTION: 0
EYE DISCHARGE: 0

## 2018-05-23 ASSESSMENT — PATIENT HEALTH QUESTIONNAIRE - PHQ9
1. LITTLE INTEREST OR PLEASURE IN DOING THINGS: 0
2. FEELING DOWN, DEPRESSED OR HOPELESS: 0
SUM OF ALL RESPONSES TO PHQ9 QUESTIONS 1 & 2: 0
1. LITTLE INTEREST OR PLEASURE IN DOING THINGS: 0
SUM OF ALL RESPONSES TO PHQ QUESTIONS 1-9: 0
SUM OF ALL RESPONSES TO PHQ QUESTIONS 1-9: 0
SUM OF ALL RESPONSES TO PHQ9 QUESTIONS 1 & 2: 0
2. FEELING DOWN, DEPRESSED OR HOPELESS: 0

## 2018-05-24 LAB
ESTIMATED AVERAGE GLUCOSE: 111.2 MG/DL
HBA1C MFR BLD: 5.5 %

## 2018-07-16 DIAGNOSIS — F33.0 MILD RECURRENT MAJOR DEPRESSION (HCC): ICD-10-CM

## 2018-07-16 DIAGNOSIS — F41.9 ANXIETY: ICD-10-CM

## 2018-07-16 RX ORDER — IMIPRAMINE HCL 25 MG
TABLET ORAL
Qty: 90 TABLET | Refills: 0 | Status: SHIPPED | OUTPATIENT
Start: 2018-07-16 | End: 2018-10-08 | Stop reason: SDUPTHER

## 2018-07-28 ENCOUNTER — APPOINTMENT (OUTPATIENT)
Dept: CT IMAGING | Age: 73
End: 2018-07-28
Payer: MEDICARE

## 2018-07-28 ENCOUNTER — HOSPITAL ENCOUNTER (EMERGENCY)
Age: 73
Discharge: HOME | End: 2018-07-28
Attending: EMERGENCY MEDICINE
Payer: MEDICARE

## 2018-07-28 VITALS
OXYGEN SATURATION: 100 % | RESPIRATION RATE: 16 BRPM | SYSTOLIC BLOOD PRESSURE: 178 MMHG | TEMPERATURE: 98 F | DIASTOLIC BLOOD PRESSURE: 87 MMHG | HEART RATE: 56 BPM

## 2018-07-28 DIAGNOSIS — T14.8XXA ABRASION OF SKIN: ICD-10-CM

## 2018-07-28 DIAGNOSIS — S51.012A SKIN TEAR OF LEFT ELBOW WITHOUT COMPLICATION, INITIAL ENCOUNTER: Primary | ICD-10-CM
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
## 2018-07-28 PROCEDURE — 4500000024 HC ED LEVEL 4 PROCEDURE

## 2018-07-28 PROCEDURE — 6370000000 HC RX 637 (ALT 250 FOR IP): Performed by: EMERGENCY MEDICINE

## 2018-07-28 PROCEDURE — 99284 EMERGENCY DEPT VISIT MOD MDM: CPT

## 2018-07-28 PROCEDURE — 70450 CT HEAD/BRAIN W/O DYE: CPT

## 2018-07-28 RX ORDER — GINSENG 100 MG
CAPSULE ORAL ONCE
Status: COMPLETED | OUTPATIENT
Start: 2018-07-28 | End: 2018-07-28

## 2018-07-28 RX ADMIN — BACITRACIN: 500 OINTMENT TOPICAL at 14:45

## 2018-07-28 ASSESSMENT — PAIN DESCRIPTION - LOCATION: LOCATION: HEAD

## 2018-07-28 ASSESSMENT — PAIN DESCRIPTION - FREQUENCY: FREQUENCY: CONTINUOUS

## 2018-07-28 ASSESSMENT — PAIN SCALES - GENERAL: PAINLEVEL_OUTOF10: 5

## 2018-07-28 ASSESSMENT — PAIN DESCRIPTION - DESCRIPTORS: DESCRIPTORS: ACHING

## 2018-07-28 NOTE — ED PROVIDER NOTES
by mouth as needed for Erectile Dysfunction    TRAMADOL (ULTRAM) 50 MG TABLET    Take 1 tablet by mouth every 8 hours as needed for Pain May cause drowsiness. May impair ability to operate vehicles or machinery. Do not use in combination with alcohol       Allergies     He is allergic to levofloxacin. Physical Exam     INITIAL VITALS: BP: (!) 178/87, Temp: 98 °F (36.7 °C), Pulse: 56, Resp: 16, SpO2: 100 %   General: 70-year-old male, sitting in bed, no apparent cardiorespiratory distress  HEENT:  Multiple abrasions over the patient's forehead and right frontal scalp, pupils equal round and reactive to light, sclera are clear, oropharynx is nonerythematous  Neck: supple, no lymphadenopathy  Chest: clear to auscultation bilaterally with no wheezes rhonchi, rales  Cardiovascular: Regular, rate, and rhythm, normal S1S2, no murmurs, rubs, or gallops, 2+ radial pulses bilaterally, capillary refill 2 seconds  Abdominal: Soft, nontender, nondistended, positive bowel sounds throughout, no rebound or guarding  Skin: Warm, dry well perfused, patient has multiple abrasions over the bilateral lower legs, all of which are very superficial.  He also has a small skin tear over his left elbow  Extremities: no obvious deformities, no evidence of any bony tenderness upon palpation of the bilateral lower legs and bilateral arms, no evidence of any cervical, thoracic or lumbar spine tenderness, step-off or deformity  Neurologic:  alert and oriented x4, speech is clear and intact without dysarthria, gait is intact    Diagnostic Results       RADIOLOGY:  CT Head WO Contrast   Final Result      No acute hemorrhage. LABS:   No results found for this visit on 07/28/18. RECENT VITALS:  BP: (!) 178/87, Temp: 98 °F (36.7 °C), Pulse: 56, Resp: 16, SpO2: 100 %     Procedures     Left elbow laceration repair.   The skin was thoroughly cleaned using chlorhexidine and saline scrub the wound was then reapproximated was characterize as

## 2018-07-28 NOTE — ED TRIAGE NOTES
Pt has history of parkinsons, was doing yard work and fell face forward into the shrubs. Abrasions noted to forehead, left elbow, and left leg. Pt denies any LOC, states \"I lost my balance\".

## 2018-08-21 RX ORDER — SIMVASTATIN 10 MG
TABLET ORAL
Qty: 90 TABLET | Refills: 3 | Status: SHIPPED | OUTPATIENT
Start: 2018-08-21 | End: 2019-02-18 | Stop reason: SDUPTHER

## 2018-09-10 ENCOUNTER — HOSPITAL ENCOUNTER (OUTPATIENT)
Age: 73
Setting detail: OBSERVATION
Discharge: HOME | End: 2018-09-11
Attending: EMERGENCY MEDICINE | Admitting: INTERNAL MEDICINE
Payer: MEDICARE

## 2018-09-10 DIAGNOSIS — R42 LIGHTHEADEDNESS: ICD-10-CM

## 2018-09-10 DIAGNOSIS — R07.9 CHEST PAIN, UNSPECIFIED TYPE: Primary | ICD-10-CM

## 2018-09-10 LAB
BASOPHILS ABSOLUTE: 0 K/UL (ref 0–0.2)
BASOPHILS RELATIVE PERCENT: 0.2 %
CALCIUM IONIZED: 1.17 MMOL/L (ref 1.12–1.32)
CO2: 29 MMOL/L (ref 21–32)
D DIMER: 290 NG/ML DDU (ref 0–229)
D DIMER: <200 NG/ML DDU (ref 0–229)
EOSINOPHILS ABSOLUTE: 0.1 K/UL (ref 0–0.6)
EOSINOPHILS RELATIVE PERCENT: 1.7 %
GFR AFRICAN AMERICAN: >60
GFR NON-AFRICAN AMERICAN: 59
GLUCOSE BLD-MCNC: 127 MG/DL (ref 70–99)
HCT VFR BLD CALC: 42.1 % (ref 40.5–52.5)
HEMOGLOBIN: 13.8 G/DL (ref 13.5–17.5)
LYMPHOCYTES ABSOLUTE: 1.1 K/UL (ref 1–5.1)
LYMPHOCYTES RELATIVE PERCENT: 12.9 %
MCH RBC QN AUTO: 31.5 PG (ref 26–34)
MCHC RBC AUTO-ENTMCNC: 32.9 G/DL (ref 31–36)
MCV RBC AUTO: 95.7 FL (ref 80–100)
MONOCYTES ABSOLUTE: 0.7 K/UL (ref 0–1.3)
MONOCYTES RELATIVE PERCENT: 7.6 %
NEUTROPHILS ABSOLUTE: 6.6 K/UL (ref 1.7–7.7)
NEUTROPHILS RELATIVE PERCENT: 77.6 %
PDW BLD-RTO: 13.5 % (ref 12.4–15.4)
PERFORMED ON: ABNORMAL
PERFORMED ON: NORMAL
PERFORMED ON: NORMAL
PLATELET # BLD: 189 K/UL (ref 135–450)
PMV BLD AUTO: 8.4 FL (ref 5–10.5)
POC ANION GAP: 11 (ref 10–20)
POC BUN: 25 MG/DL (ref 7–18)
POC CHLORIDE: 101 MMOL/L (ref 99–110)
POC CREATININE: 1.2 MG/DL (ref 0.8–1.3)
POC POTASSIUM: 4.1 MMOL/L (ref 3.5–5.1)
POC SAMPLE TYPE: ABNORMAL
POC SAMPLE TYPE: NORMAL
POC SAMPLE TYPE: NORMAL
POC SODIUM: 141 MMOL/L (ref 136–145)
POC TROPONIN I: 0.01 NG/ML (ref 0–0.1)
POC TROPONIN I: 0.01 NG/ML (ref 0–0.1)
RBC # BLD: 4.39 M/UL (ref 4.2–5.9)
TROPONIN: <0.01 NG/ML
WBC # BLD: 8.5 K/UL (ref 4–11)

## 2018-09-10 PROCEDURE — 84484 ASSAY OF TROPONIN QUANT: CPT

## 2018-09-10 PROCEDURE — 85379 FIBRIN DEGRADATION QUANT: CPT

## 2018-09-10 PROCEDURE — G0378 HOSPITAL OBSERVATION PER HR: HCPCS

## 2018-09-10 PROCEDURE — 96372 THER/PROPH/DIAG INJ SC/IM: CPT

## 2018-09-10 PROCEDURE — 93005 ELECTROCARDIOGRAM TRACING: CPT | Performed by: EMERGENCY MEDICINE

## 2018-09-10 PROCEDURE — 85025 COMPLETE CBC W/AUTO DIFF WBC: CPT

## 2018-09-10 PROCEDURE — 2580000003 HC RX 258: Performed by: INTERNAL MEDICINE

## 2018-09-10 PROCEDURE — 6370000000 HC RX 637 (ALT 250 FOR IP): Performed by: EMERGENCY MEDICINE

## 2018-09-10 PROCEDURE — 96361 HYDRATE IV INFUSION ADD-ON: CPT

## 2018-09-10 PROCEDURE — 99285 EMERGENCY DEPT VISIT HI MDM: CPT

## 2018-09-10 PROCEDURE — 6360000002 HC RX W HCPCS: Performed by: INTERNAL MEDICINE

## 2018-09-10 PROCEDURE — 96360 HYDRATION IV INFUSION INIT: CPT

## 2018-09-10 PROCEDURE — 36415 COLL VENOUS BLD VENIPUNCTURE: CPT

## 2018-09-10 PROCEDURE — 2580000003 HC RX 258: Performed by: EMERGENCY MEDICINE

## 2018-09-10 PROCEDURE — 80047 BASIC METABLC PNL IONIZED CA: CPT

## 2018-09-10 PROCEDURE — 6370000000 HC RX 637 (ALT 250 FOR IP): Performed by: INTERNAL MEDICINE

## 2018-09-10 RX ORDER — ASPIRIN 81 MG/1
81 TABLET ORAL
Status: DISCONTINUED | OUTPATIENT
Start: 2018-09-10 | End: 2018-09-11 | Stop reason: HOSPADM

## 2018-09-10 RX ORDER — ONDANSETRON 2 MG/ML
4 INJECTION INTRAMUSCULAR; INTRAVENOUS EVERY 6 HOURS PRN
Status: DISCONTINUED | OUTPATIENT
Start: 2018-09-10 | End: 2018-09-11 | Stop reason: HOSPADM

## 2018-09-10 RX ORDER — OMEGA-3/DHA/EPA/FISH OIL 300-1000MG
1 CAPSULE ORAL DAILY
Status: DISCONTINUED | OUTPATIENT
Start: 2018-09-10 | End: 2018-09-10 | Stop reason: CLARIF

## 2018-09-10 RX ORDER — RIVASTIGMINE 9.5 MG/24H
1 PATCH, EXTENDED RELEASE TRANSDERMAL DAILY
Status: DISCONTINUED | OUTPATIENT
Start: 2018-09-10 | End: 2018-09-11 | Stop reason: HOSPADM

## 2018-09-10 RX ORDER — 0.9 % SODIUM CHLORIDE 0.9 %
500 INTRAVENOUS SOLUTION INTRAVENOUS ONCE
Status: COMPLETED | OUTPATIENT
Start: 2018-09-10 | End: 2018-09-10

## 2018-09-10 RX ORDER — SODIUM CHLORIDE 0.9 % (FLUSH) 0.9 %
10 SYRINGE (ML) INJECTION EVERY 12 HOURS SCHEDULED
Status: DISCONTINUED | OUTPATIENT
Start: 2018-09-10 | End: 2018-09-11 | Stop reason: HOSPADM

## 2018-09-10 RX ORDER — PANTOPRAZOLE SODIUM 40 MG/1
40 TABLET, DELAYED RELEASE ORAL DAILY
Status: DISCONTINUED | OUTPATIENT
Start: 2018-09-11 | End: 2018-09-11 | Stop reason: HOSPADM

## 2018-09-10 RX ORDER — ACETAMINOPHEN 325 MG/1
650 TABLET ORAL EVERY 4 HOURS PRN
Status: DISCONTINUED | OUTPATIENT
Start: 2018-09-10 | End: 2018-09-11 | Stop reason: HOSPADM

## 2018-09-10 RX ORDER — AMANTADINE HYDROCHLORIDE 100 MG/1
100 CAPSULE, GELATIN COATED ORAL 2 TIMES DAILY
Status: DISCONTINUED | OUTPATIENT
Start: 2018-09-10 | End: 2018-09-11 | Stop reason: HOSPADM

## 2018-09-10 RX ORDER — ASPIRIN 81 MG/1
324 TABLET, CHEWABLE ORAL ONCE
Status: COMPLETED | OUTPATIENT
Start: 2018-09-10 | End: 2018-09-10

## 2018-09-10 RX ORDER — SODIUM CHLORIDE 0.9 % (FLUSH) 0.9 %
10 SYRINGE (ML) INJECTION PRN
Status: DISCONTINUED | OUTPATIENT
Start: 2018-09-10 | End: 2018-09-11 | Stop reason: HOSPADM

## 2018-09-10 RX ORDER — SODIUM CHLORIDE 9 MG/ML
INJECTION, SOLUTION INTRAVENOUS
Status: DISPENSED
Start: 2018-09-10 | End: 2018-09-11

## 2018-09-10 RX ORDER — TRAMADOL HYDROCHLORIDE 50 MG/1
50 TABLET ORAL EVERY 8 HOURS PRN
Status: DISCONTINUED | OUTPATIENT
Start: 2018-09-10 | End: 2018-09-11 | Stop reason: HOSPADM

## 2018-09-10 RX ORDER — IMIPRAMINE HCL 25 MG
25 TABLET ORAL NIGHTLY
Status: DISCONTINUED | OUTPATIENT
Start: 2018-09-10 | End: 2018-09-11 | Stop reason: HOSPADM

## 2018-09-10 RX ORDER — CLONAZEPAM 1 MG/1
1 TABLET ORAL NIGHTLY
Status: DISCONTINUED | OUTPATIENT
Start: 2018-09-10 | End: 2018-09-11 | Stop reason: HOSPADM

## 2018-09-10 RX ORDER — SIMVASTATIN 10 MG
10 TABLET ORAL NIGHTLY
Status: DISCONTINUED | OUTPATIENT
Start: 2018-09-10 | End: 2018-09-11 | Stop reason: HOSPADM

## 2018-09-10 RX ADMIN — IMIPRAMINE HYDROCHLORIDE 25 MG: 25 TABLET, FILM COATED ORAL at 21:07

## 2018-09-10 RX ADMIN — CARBIDOPA AND LEVODOPA 1 TABLET: 25; 100 TABLET ORAL at 21:06

## 2018-09-10 RX ADMIN — ENOXAPARIN SODIUM 40 MG: 40 INJECTION SUBCUTANEOUS at 21:06

## 2018-09-10 RX ADMIN — SODIUM CHLORIDE 500 ML: 9 INJECTION, SOLUTION INTRAVENOUS at 14:46

## 2018-09-10 RX ADMIN — AMANTADINE HYDROCHLORIDE 100 MG: 100 CAPSULE ORAL at 21:07

## 2018-09-10 RX ADMIN — CLONAZEPAM 1 MG: 1 TABLET ORAL at 21:06

## 2018-09-10 RX ADMIN — ASPIRIN 81 MG: 81 TABLET, COATED ORAL at 21:06

## 2018-09-10 RX ADMIN — Medication 10 ML: at 21:07

## 2018-09-10 RX ADMIN — ASPIRIN 81 MG 324 MG: 81 TABLET ORAL at 12:14

## 2018-09-10 RX ADMIN — SIMVASTATIN 10 MG: 10 TABLET, FILM COATED ORAL at 21:06

## 2018-09-10 ASSESSMENT — ENCOUNTER SYMPTOMS
VOMITING: 0
SHORTNESS OF BREATH: 0
CHEST TIGHTNESS: 1
NAUSEA: 1
DIARRHEA: 0
ABDOMINAL PAIN: 0

## 2018-09-10 ASSESSMENT — PAIN SCALES - GENERAL
PAINLEVEL_OUTOF10: 0

## 2018-09-10 NOTE — ED PROVIDER NOTES
[address] [facility]          ATTENDING PHYSICIAN NOTE       Date of evaluation: 9/10/2018    Chief Complaint     Chest Pain (resolved)      History of Present Illness     Brant Pinedo is a 68 y.o. male who presents with and chest pressure driving home from his dermatologist appointment today. He had several episodes of this and he describes it as a pressure-like discomfort in his chest but he is unsure whether or not this is reminiscent of previous acute coronary syndrome related discomfort. Upon arrival home, he developed lightheadedness and dizziness, and his wife notes that he became diaphoretic and looked ill. He did feel a little bit like he was going to pass out but did not. This went on for too long such that he had to call 911. The patient states that his chest pain is no longer present and he feels just a little bit lightheaded currently but does not feel the same illness as he did while he was eating at home. He is not describing any shortness of breath, fevers chills or cough. He denies any other symptoms at this time. The patient states that he sees Dr. Vineet Gaspar for coronary artery disease and had a prior angiogram her they could not do placed stents because the vessel was too small and that it was to be medically managed for now. Review of Systems     Review of Systems   Constitutional: Positive for diaphoresis. Negative for chills and fever. Respiratory: Positive for chest tightness. Negative for shortness of breath. Cardiovascular: Negative for chest pain. Gastrointestinal: Positive for nausea. Negative for abdominal pain, diarrhea and vomiting. Neurological: Positive for dizziness and light-headedness. Negative for syncope and weakness. All other systems reviewed and are negative. Past Medical, Surgical, Family, and Social History     He has a past medical history of Anxiety; Arthritis; Arthritis of hand, right; Sneed esophagus;  Sneed Duration 92 ms    Q-T Interval 436 ms    QTc Calculation (Bazett) 397 ms    P Axis 49 degrees    R Axis 4 degrees    T Axis 33 degrees    Diagnosis       EKG performed in ER and to be interpreted by ER physician. Confirmed by MD, ER ([id number]),  Stacy Wiley ([id number]) on 9/10/2018 11:55:20 AM     RECENT VITALS:  BP: (!) 163/80, Temp: 97.4 °F (36.3 °C), Pulse: 57, Resp: 18, SpO2: 98 %       ED Course     Nursing Notes, Past Medical Hx, Past Surgical Hx, Social Hx, Allergies, and Family Hx were reviewed. The patient was given the following medications:  Orders Placed This Encounter   Medications    aspirin chewable tablet 324 mg    sodium chloride 0.9 % infusion     DARRION HOGAN: cabinet override    0.9 % sodium chloride bolus       CONSULTS:  IP CONSULT TO CARDIOLOGY  IP CONSULT TO HOSPITALIST    MEDICAL DECISION MAKING / ASSESSMENT / Pankaj Julienne is a 68 y.o. male presenting with an episode of chest pain which was followed by lightheadedness and diaphoresis. The patient seems to be feeling better at this point in time and his initial evaluation including 2 troponins and EKG are unremarkable. There is no obvious ischemia at this time. After speaking with Dr. Sophie Arreola, and having him review the chart, he would like the patient admitted so he can further evaluate him. The hospitalist was contacted. Clinical Impression     1. Chest pain, unspecified type    2.  Lightheadedness        Disposition     DISPOSITION Admitted 09/10/2018 04:22:55 PM       Adenike Sanchez MD  09/10/18 [id number]

## 2018-09-10 NOTE — PROGRESS NOTES
Pt admitted from ED into room 4310. Vital signs obtained, placed on telemetry, oriented to room and fall precautions put into place. Will continue to monitor.  Electronically signed by Christy Rao RN on 9/10/2018 at 6:33 PM

## 2018-09-10 NOTE — H&P
Hospital Medicine History & Physical      PCP: Chip Dominguez MD    Date of Admission: 9/10/2018    Date of Service: Pt seen/examined on 09/10/18       Chief Complaint:  Chest pain       History Of Present Illness:    68 y.o. male who presented to hospital with cp, cp happened when patient was driving from dermatology office to home. Pain lasted for few seconds, went away without any intervention. Felt dizzy and lightheaded afterwards. Denies any sob, nausea ,vomiting. He drove to [place] recently 3 weeks ago. Past Medical History:          Diagnosis Date    Anxiety     PCP in [place]:Dr. Mitra Serrato.    Arthritis     Arthritis of hand, right 6/20/2012    Sneed esophagus     Dr. Sofai Puente. EGD:1/17/2012. Next EGD due in 3yrs=1/2015    Sneed esophagus     Under care of GI    Bilateral carotid artery stenosis <50% 4/30/2014    BPH (benign prostatic hypertrophy)     Dr. Reine Brunner. PSA per urology.     CAD (coronary artery disease)     under care of cards:Dr. Chelsi Donahue    Carotid stenosis     Chest pain     pt states he no chest pain in 5 yrs, panic attack    Chronic back pain 1/26/2012    Chronic back pain     under care of ortho spine:Dr. Sweeney    Constipation 8/6/2013    Degenerative arthritis of thoracic spine 1/26/2012    Degenerative cervical disc 1/26/2012    Depression with anxiety 11/15/2011    Klonopin per neurologist(Dr. Laura Ferrer)    Diverticulosis 1/17/2012    colonoscopy    Elevated PSA     8/21/14;5/2013:under care of Dr. Beltran/[place]:[facility]    Erectile dysfunction 6/13/2017    Essential hypertension, benign 6/20/2012    cardiologist:Dr. Mehran Agrawal    GERD (gastroesophageal reflux disease)     Hemorrhoid     Hiatal hernia     small    Hyperlipidemia     Impaired fasting glucose 5/19/2014    Osteopenia per CXR 5/19/2014    Parkinson disease ([facility] [address]     Dr. Irvin Ornelas Neurology    Renal stone 4/2012    4 mm distal left ureteral calculus:Dr. Beltran:urologist    have any questions or concerns please feel free to contact me at [phone].

## 2018-09-11 ENCOUNTER — APPOINTMENT (OUTPATIENT)
Dept: NUCLEAR MEDICINE | Age: 73
End: 2018-09-11
Payer: MEDICARE

## 2018-09-11 VITALS
OXYGEN SATURATION: 98 % | RESPIRATION RATE: 18 BRPM | DIASTOLIC BLOOD PRESSURE: 95 MMHG | WEIGHT: 158.29 LBS | TEMPERATURE: 98 F | HEIGHT: 68 IN | SYSTOLIC BLOOD PRESSURE: 174 MMHG | BODY MASS INDEX: 23.99 KG/M2 | HEART RATE: 56 BPM

## 2018-09-11 LAB
ANION GAP SERPL CALCULATED.3IONS-SCNC: 10 MMOL/L (ref 3–16)
BUN BLDV-MCNC: 21 MG/DL (ref 7–20)
CALCIUM SERPL-MCNC: 9.4 MG/DL (ref 8.3–10.6)
CHLORIDE BLD-SCNC: 102 MMOL/L (ref 99–110)
CO2: 27 MMOL/L (ref 21–32)
CREAT SERPL-MCNC: 0.9 MG/DL (ref 0.8–1.3)
GFR AFRICAN AMERICAN: >60
GFR NON-AFRICAN AMERICAN: >60
GLUCOSE BLD-MCNC: 98 MG/DL (ref 70–99)
LV EF: 58 %
LV EF: 65 %
LVEF MODALITY: NORMAL
LVEF MODALITY: NORMAL
POTASSIUM REFLEX MAGNESIUM: 4.7 MMOL/L (ref 3.5–5.1)
SODIUM BLD-SCNC: 139 MMOL/L (ref 136–145)
TROPONIN: <0.01 NG/ML

## 2018-09-11 PROCEDURE — G0378 HOSPITAL OBSERVATION PER HR: HCPCS

## 2018-09-11 PROCEDURE — A9502 TC99M TETROFOSMIN: HCPCS | Performed by: INTERNAL MEDICINE

## 2018-09-11 PROCEDURE — 6360000002 HC RX W HCPCS: Performed by: INTERNAL MEDICINE

## 2018-09-11 PROCEDURE — 36415 COLL VENOUS BLD VENIPUNCTURE: CPT

## 2018-09-11 PROCEDURE — 93306 TTE W/DOPPLER COMPLETE: CPT

## 2018-09-11 PROCEDURE — 78452 HT MUSCLE IMAGE SPECT MULT: CPT

## 2018-09-11 PROCEDURE — 2580000003 HC RX 258: Performed by: INTERNAL MEDICINE

## 2018-09-11 PROCEDURE — 3430000000 HC RX DIAGNOSTIC RADIOPHARMACEUTICAL: Performed by: INTERNAL MEDICINE

## 2018-09-11 PROCEDURE — 80048 BASIC METABOLIC PNL TOTAL CA: CPT

## 2018-09-11 PROCEDURE — 93017 CV STRESS TEST TRACING ONLY: CPT

## 2018-09-11 PROCEDURE — 6370000000 HC RX 637 (ALT 250 FOR IP): Performed by: INTERNAL MEDICINE

## 2018-09-11 RX ADMIN — TETROFOSMIN 10 MILLICURIE: 1.38 INJECTION, POWDER, LYOPHILIZED, FOR SOLUTION INTRAVENOUS at 07:44

## 2018-09-11 RX ADMIN — CARBIDOPA AND LEVODOPA 1 TABLET: 25; 100 TABLET ORAL at 12:55

## 2018-09-11 RX ADMIN — Medication 10 ML: at 10:01

## 2018-09-11 RX ADMIN — Medication 10 ML: at 07:30

## 2018-09-11 RX ADMIN — PANTOPRAZOLE SODIUM 40 MG: 40 TABLET, DELAYED RELEASE ORAL at 12:55

## 2018-09-11 RX ADMIN — TETROFOSMIN 30 MILLICURIE: 1.38 INJECTION, POWDER, LYOPHILIZED, FOR SOLUTION INTRAVENOUS at 10:01

## 2018-09-11 RX ADMIN — AMANTADINE HYDROCHLORIDE 100 MG: 100 CAPSULE ORAL at 14:14

## 2018-09-11 RX ADMIN — REGADENOSON 0.4 MG: 0.08 INJECTION, SOLUTION INTRAVENOUS at 10:01

## 2018-09-11 RX ADMIN — CARBIDOPA AND LEVODOPA 1 TABLET: 25; 100 TABLET ORAL at 00:55

## 2018-09-11 RX ADMIN — Medication 10 ML: at 13:25

## 2018-09-11 RX ADMIN — CARBIDOPA AND LEVODOPA 2 TABLET: 25; 100 TABLET ORAL at 14:14

## 2018-09-11 ASSESSMENT — PAIN SCALES - GENERAL
PAINLEVEL_OUTOF10: 0

## 2018-09-11 NOTE — PROGRESS NOTES
Pt and wife given discharge instructions. Both verbalize understanding. F/u cardiology apt made per cardio rn. IV removed, dressing applied. Stable at discharge. Wife to transport home.

## 2018-09-13 NOTE — DISCHARGE SUMMARY
Hospital Discharge Summary           Admitting Physician: Jing Zapien MD  Consults:  · Chest pain   · Parkinsonism     Discharging Physician: Nithya Saldaña Diagnoses:   Chest pain   parkinsonism     HPI: 69 y/o male p/w cp, lightheadedness      Brief hospital course:      Patient p/w cp, ACS ruled out, trop negative. Stress negative. Echo showed normal LVEF. Cardiology was consulted. Dc patient home and OP follow with cardiology            Physical Exam: BP (!) 174/95   Pulse 56   Temp 98 °F (36.7 °C) (Oral)   Resp 18   Ht 5' 8\" (1.727 m)   Wt 158 lb 4.6 oz (71.8 kg)   SpO2 98%   BMI 24.07 kg/m²     Physical Exam  Physical Exam   Constitutional: He is oriented to person, place, and time and well-developed, well-nourished, and in no distress. No distress. HENT:   Head: Normocephalic and atraumatic. Cardiovascular: Normal rate and regular rhythm. No murmur heard. Pulmonary/Chest: Effort normal and breath sounds normal. No respiratory distress. He has no wheezes. Abdominal: Soft. Bowel sounds are normal. He exhibits no distension. There is no tenderness. There is no rebound. Musculoskeletal: Normal range of motion. He exhibits no edema. Neurological: He is alert and oriented to person, place, and time. No cranial nerve deficit. Coordination normal.   Skin: Skin is warm and dry. He is not diaphoretic. No erythema. Psychiatric: Affect and judgment normal.            LABS:  No results for input(s): WBC, HGB, PLT in the last 72 hours. Recent Labs      09/11/18   0514   NA  139   K  4.7   CL  102   CO2  27   BUN  21*   CREATININE  0.9   GLUCOSE  98     No results for input(s): INR in the last 72 hours. Significant Diagnostic Studies:    DIAGNOSTIC SCANNED REPORT   Final Result      Stress/Rest NM Myocardial SPECT RX   Final Result      1. No evidence of stress-induced myocardial ischemia. 2.  Normal LVEF 65%. Treatments: as mentioned above. in this patients care. If you have any questions or concerns please feel free to contact me at [phone].

## 2018-09-20 LAB
EKG ATRIAL RATE: 50 BPM
EKG DIAGNOSIS: NORMAL
EKG P AXIS: 49 DEGREES
EKG P-R INTERVAL: 140 MS
EKG Q-T INTERVAL: 436 MS
EKG QRS DURATION: 92 MS
EKG QTC CALCULATION (BAZETT): 397 MS
EKG R AXIS: 4 DEGREES
EKG T AXIS: 33 DEGREES
EKG VENTRICULAR RATE: 50 BPM

## 2018-10-01 ENCOUNTER — OFFICE VISIT (OUTPATIENT)
Dept: FAMILY MEDICINE CLINIC | Age: 73
End: 2018-10-01
Payer: MEDICARE

## 2018-10-01 VITALS
RESPIRATION RATE: 16 BRPM | HEIGHT: 68 IN | TEMPERATURE: 98.4 F | SYSTOLIC BLOOD PRESSURE: 112 MMHG | HEART RATE: 64 BPM | OXYGEN SATURATION: 100 % | DIASTOLIC BLOOD PRESSURE: 76 MMHG | BODY MASS INDEX: 24.35 KG/M2 | WEIGHT: 160.7 LBS

## 2018-10-01 DIAGNOSIS — R73.03 PREDIABETES: ICD-10-CM

## 2018-10-01 DIAGNOSIS — I10 ESSENTIAL HYPERTENSION, BENIGN: ICD-10-CM

## 2018-10-01 DIAGNOSIS — I25.83 CORONARY ARTERY DISEASE DUE TO LIPID RICH PLAQUE: ICD-10-CM

## 2018-10-01 DIAGNOSIS — R42 LIGHTHEADEDNESS: ICD-10-CM

## 2018-10-01 DIAGNOSIS — R07.89 OTHER CHEST PAIN: Primary | ICD-10-CM

## 2018-10-01 DIAGNOSIS — G20 PARKINSON DISEASE (HCC): ICD-10-CM

## 2018-10-01 DIAGNOSIS — F33.0 MILD RECURRENT MAJOR DEPRESSION (HCC): ICD-10-CM

## 2018-10-01 DIAGNOSIS — F41.9 ANXIETY: ICD-10-CM

## 2018-10-01 DIAGNOSIS — I25.10 CORONARY ARTERY DISEASE DUE TO LIPID RICH PLAQUE: ICD-10-CM

## 2018-10-01 DIAGNOSIS — Z23 NEED FOR INFLUENZA VACCINATION: ICD-10-CM

## 2018-10-01 DIAGNOSIS — E78.2 HYPERLIPIDEMIA, MIXED: ICD-10-CM

## 2018-10-01 PROCEDURE — 1123F ACP DISCUSS/DSCN MKR DOCD: CPT | Performed by: FAMILY MEDICINE

## 2018-10-01 PROCEDURE — G8420 CALC BMI NORM PARAMETERS: HCPCS | Performed by: FAMILY MEDICINE

## 2018-10-01 PROCEDURE — 1101F PT FALLS ASSESS-DOCD LE1/YR: CPT | Performed by: FAMILY MEDICINE

## 2018-10-01 PROCEDURE — 3017F COLORECTAL CA SCREEN DOC REV: CPT | Performed by: FAMILY MEDICINE

## 2018-10-01 PROCEDURE — G8598 ASA/ANTIPLAT THER USED: HCPCS | Performed by: FAMILY MEDICINE

## 2018-10-01 PROCEDURE — G0008 ADMIN INFLUENZA VIRUS VAC: HCPCS | Performed by: FAMILY MEDICINE

## 2018-10-01 PROCEDURE — G8427 DOCREV CUR MEDS BY ELIG CLIN: HCPCS | Performed by: FAMILY MEDICINE

## 2018-10-01 PROCEDURE — G8482 FLU IMMUNIZE ORDER/ADMIN: HCPCS | Performed by: FAMILY MEDICINE

## 2018-10-01 PROCEDURE — 99214 OFFICE O/P EST MOD 30 MIN: CPT | Performed by: FAMILY MEDICINE

## 2018-10-01 PROCEDURE — 1036F TOBACCO NON-USER: CPT | Performed by: FAMILY MEDICINE

## 2018-10-01 PROCEDURE — 90662 IIV NO PRSV INCREASED AG IM: CPT | Performed by: FAMILY MEDICINE

## 2018-10-01 PROCEDURE — 4040F PNEUMOC VAC/ADMIN/RCVD: CPT | Performed by: FAMILY MEDICINE

## 2018-10-01 ASSESSMENT — PATIENT HEALTH QUESTIONNAIRE - PHQ9
SUM OF ALL RESPONSES TO PHQ QUESTIONS 1-9: 0
SUM OF ALL RESPONSES TO PHQ QUESTIONS 1-9: 0
1. LITTLE INTEREST OR PLEASURE IN DOING THINGS: 0
2. FEELING DOWN, DEPRESSED OR HOPELESS: 0
SUM OF ALL RESPONSES TO PHQ9 QUESTIONS 1 & 2: 0

## 2018-10-01 ASSESSMENT — ENCOUNTER SYMPTOMS
NAUSEA: 0
CHEST TIGHTNESS: 0
CONSTIPATION: 0
DIARRHEA: 0
APNEA: 0
SHORTNESS OF BREATH: 0
BLOOD IN STOOL: 0
ANAL BLEEDING: 0
VOMITING: 0
RECTAL PAIN: 0
ABDOMINAL PAIN: 0
COUGH: 0
WHEEZING: 0
ABDOMINAL DISTENTION: 0
STRIDOR: 0
CHOKING: 0

## 2018-10-08 DIAGNOSIS — F33.0 MILD RECURRENT MAJOR DEPRESSION (HCC): ICD-10-CM

## 2018-10-08 DIAGNOSIS — F41.9 ANXIETY: ICD-10-CM

## 2018-10-09 RX ORDER — IMIPRAMINE HCL 25 MG
TABLET ORAL
Qty: 90 TABLET | Refills: 0 | Status: SHIPPED | OUTPATIENT
Start: 2018-10-09 | End: 2018-12-23 | Stop reason: SDUPTHER

## 2018-10-22 DIAGNOSIS — R73.03 PREDIABETES: ICD-10-CM

## 2018-10-22 DIAGNOSIS — I25.10 CORONARY ARTERY DISEASE DUE TO LIPID RICH PLAQUE: ICD-10-CM

## 2018-10-22 DIAGNOSIS — E78.2 HYPERLIPIDEMIA, MIXED: ICD-10-CM

## 2018-10-22 DIAGNOSIS — I25.83 CORONARY ARTERY DISEASE DUE TO LIPID RICH PLAQUE: ICD-10-CM

## 2018-10-22 DIAGNOSIS — I10 ESSENTIAL HYPERTENSION, BENIGN: ICD-10-CM

## 2018-10-22 LAB
A/G RATIO: 1.8 (ref 1.1–2.2)
ALBUMIN SERPL-MCNC: 4.6 G/DL (ref 3.4–5)
ALP BLD-CCNC: 89 U/L (ref 40–129)
ALT SERPL-CCNC: <5 U/L (ref 10–40)
ANION GAP SERPL CALCULATED.3IONS-SCNC: 13 MMOL/L (ref 3–16)
AST SERPL-CCNC: 20 U/L (ref 15–37)
BILIRUB SERPL-MCNC: 0.4 MG/DL (ref 0–1)
BUN BLDV-MCNC: 19 MG/DL (ref 7–20)
CALCIUM SERPL-MCNC: 9.8 MG/DL (ref 8.3–10.6)
CHLORIDE BLD-SCNC: 102 MMOL/L (ref 99–110)
CHOLESTEROL, TOTAL: 120 MG/DL (ref 0–199)
CO2: 27 MMOL/L (ref 21–32)
CREAT SERPL-MCNC: 1.1 MG/DL (ref 0.8–1.3)
GFR AFRICAN AMERICAN: >60
GFR NON-AFRICAN AMERICAN: >60
GLOBULIN: 2.6 G/DL
GLUCOSE BLD-MCNC: 99 MG/DL (ref 70–99)
HDLC SERPL-MCNC: 61 MG/DL (ref 40–60)
LDL CHOLESTEROL CALCULATED: 52 MG/DL
MAGNESIUM: 2.3 MG/DL (ref 1.8–2.4)
POTASSIUM SERPL-SCNC: 4 MMOL/L (ref 3.5–5.1)
SODIUM BLD-SCNC: 142 MMOL/L (ref 136–145)
TOTAL PROTEIN: 7.2 G/DL (ref 6.4–8.2)
TRIGL SERPL-MCNC: 34 MG/DL (ref 0–150)
VITAMIN D 25-HYDROXY: 56.2 NG/ML
VLDLC SERPL CALC-MCNC: 7 MG/DL

## 2018-10-23 LAB
ESTIMATED AVERAGE GLUCOSE: 119.8 MG/DL
HBA1C MFR BLD: 5.8 %

## 2018-11-08 ENCOUNTER — OFFICE VISIT (OUTPATIENT)
Dept: CARDIOLOGY CLINIC | Age: 73
End: 2018-11-08
Payer: MEDICARE

## 2018-11-08 ENCOUNTER — TELEPHONE (OUTPATIENT)
Dept: CARDIOLOGY CLINIC | Age: 73
End: 2018-11-08

## 2018-11-08 VITALS
WEIGHT: 160.4 LBS | OXYGEN SATURATION: 93 % | HEIGHT: 68 IN | BODY MASS INDEX: 24.31 KG/M2 | HEART RATE: 64 BPM | DIASTOLIC BLOOD PRESSURE: 70 MMHG | SYSTOLIC BLOOD PRESSURE: 126 MMHG

## 2018-11-08 DIAGNOSIS — I20.9 ANGINA PECTORIS (HCC): ICD-10-CM

## 2018-11-08 DIAGNOSIS — I10 ESSENTIAL HYPERTENSION: ICD-10-CM

## 2018-11-08 DIAGNOSIS — I25.10 CAD IN NATIVE ARTERY: Primary | ICD-10-CM

## 2018-11-08 PROCEDURE — 1036F TOBACCO NON-USER: CPT | Performed by: INTERNAL MEDICINE

## 2018-11-08 PROCEDURE — G8482 FLU IMMUNIZE ORDER/ADMIN: HCPCS | Performed by: INTERNAL MEDICINE

## 2018-11-08 PROCEDURE — 1123F ACP DISCUSS/DSCN MKR DOCD: CPT | Performed by: INTERNAL MEDICINE

## 2018-11-08 PROCEDURE — 3017F COLORECTAL CA SCREEN DOC REV: CPT | Performed by: INTERNAL MEDICINE

## 2018-11-08 PROCEDURE — 1101F PT FALLS ASSESS-DOCD LE1/YR: CPT | Performed by: INTERNAL MEDICINE

## 2018-11-08 PROCEDURE — 4040F PNEUMOC VAC/ADMIN/RCVD: CPT | Performed by: INTERNAL MEDICINE

## 2018-11-08 PROCEDURE — G8598 ASA/ANTIPLAT THER USED: HCPCS | Performed by: INTERNAL MEDICINE

## 2018-11-08 PROCEDURE — G8427 DOCREV CUR MEDS BY ELIG CLIN: HCPCS | Performed by: INTERNAL MEDICINE

## 2018-11-08 PROCEDURE — G8420 CALC BMI NORM PARAMETERS: HCPCS | Performed by: INTERNAL MEDICINE

## 2018-11-08 PROCEDURE — 99214 OFFICE O/P EST MOD 30 MIN: CPT | Performed by: INTERNAL MEDICINE

## 2018-11-08 RX ORDER — RIVASTIGMINE TARTRATE 4.5 MG/1
4.5 CAPSULE ORAL 2 TIMES DAILY
COMMUNITY
End: 2020-06-18

## 2018-11-08 NOTE — PROGRESS NOTES
Subjective:      Patient ID: Zari Herrera is a 68 y.o. male. MILY Lockhart is being seen for a 6 month follow up CAD/angina/HTN. Exercising. Active. walkingj 5x per wk. No chest pain/sob. Not tachycardia. No syncope. No pnd or orthopnea. No edema. Wt stable. BP good at home. Past Medical History:   Diagnosis Date    Anxiety     PCP in [place]:Dr. Selam Quezada.    Arthritis     Arthritis of hand, right 6/20/2012    Sneed esophagus     Dr. Cooper Aguilera. EGD:1/17/2012. Next EGD due in 3yrs=1/2015    Sneed esophagus     Under care of GI    Bilateral carotid artery stenosis <50% 4/30/2014    BPH (benign prostatic hypertrophy)     Dr. Dulce Robles. PSA per urology.     CAD (coronary artery disease)     under care of cards:Dr. Clare Ahmadi    Carotid stenosis     Chest pain     pt states he no chest pain in 5 yrs, panic attack    Chronic back pain 1/26/2012    Chronic back pain     under care of ortho spine:Dr. Sweeney    Constipation 8/6/2013    Degenerative arthritis of thoracic spine 1/26/2012    Degenerative cervical disc 1/26/2012    Depression with anxiety 11/15/2011    Klonopin per neurologist(Dr. Dean Hale)    Diverticulosis 1/17/2012    colonoscopy    Elevated PSA     8/21/14;5/2013:under care of Dr. Beltran/[place]:[facility]    Erectile dysfunction 6/13/2017    Essential hypertension, benign 6/20/2012    cardiologist:Dr. Briana Copeland    GERD (gastroesophageal reflux disease)     Hemorrhoid     Hiatal hernia     small    Hyperlipidemia     Impaired fasting glucose 5/19/2014    Osteopenia per CXR 5/19/2014    Parkinson disease ([facility] [address]     Dr. Rell Rodriguez Neurology    Renal stone 4/2012    4 mm distal left ureteral calculus:Dr. Beltran:urologist    RLS (restless legs syndrome)     S/P colonoscopy 2011    Dr. Derek Victoria per pt' next is in 10yrs-2021    S/P endoscopy 1/2012    Dr. Bettie Paul acute changes:+Barrets:next in 3yrs 1/2015    Sigmoidoscopy exam 1/17/2012    Dr. Oliva Pod tablet by mouth 2 times daily (with meals) 20 tablet 1    traMADol (ULTRAM) 50 MG tablet Take 1 tablet by mouth every 8 hours as needed for Pain May cause drowsiness. May impair ability to operate vehicles or machinery. Do not use in combination with alcohol 15 tablet 0    Amantadine (SYMMETREL) 100 MG TABS tablet Take 100 mg by mouth 2 times daily      linaclotide (LINZESS) 290 MCG CAPS capsule Take 290 mcg by mouth every morning (before breakfast)      clonazePAM (KLONOPIN) 0.5 MG tablet Take 1 mg by mouth nightly       carbidopa-levodopa (SINEMET)  MG per tablet Take by mouth Take 3 tablets at 0800, 1300 1700 and one tablet at 2300      Cholecalciferol (VITAMIN D3) 2000 UNITS CAPS   Take 1 capsule by mouth daily       aspirin 81 MG EC tablet   Take 81 mg by mouth three times a week On Mondays, Wednesdays and Fridays      Polyethylene Glycol 3350 (MIRALAX PO)   Take 17 g by mouth daily as needed       pantoprazole (PROTONIX) 40 MG tablet   Take 40 mg by mouth daily        No current facility-administered medications for this visit. Vitals  Weight: 160 lb 6.4 oz (72.8 kg)  Vitals:    11/08/18 0846   BP: 126/70   Pulse: 64   SpO2: 93%             Review of Systems   Constitutional: Negative for activity change. Some  fatigue. Respiratory: Negative for apnea, cough, choking, Has chest tightness and shortness of breath. Cardiovascular: Negative for chest pain, palpitations and leg swelling. No PND or orthopnea. No tachycardia. Gastrointestinal: Negative for abdominal distention. Musculoskeletal: Negative for myalgias. Neurological: Negative for light-headedness. Negative for dizziness and syncope. Psychiatric/Behavioral: Negative for behavioral problems, confusion and agitation. Other systems reviewed negative as done. Objective:   Physical Exam   Constitutional: He is oriented to person, place, and time. He appears well-developed and well-nourished. No distress.

## 2018-11-09 ENCOUNTER — TELEPHONE (OUTPATIENT)
Dept: CARDIOLOGY CLINIC | Age: 73
End: 2018-11-09

## 2018-11-09 NOTE — TELEPHONE ENCOUNTER
Mr aFwn Mortensen saw Dr. Boris Celestin yesterday and is concerned about something on his AVS.  He said the AVS indicates he is to stop taking his parkinson's medication. This wasn't prescribed by Dr. Boris Celestin and he isn't sure why Dr. Boris Celestin would discontinue it. The office note does indicate:    Medication Changes           (Alternate therapy)     I told it it was likely not listed when they reviewed his medications and so they thought he was no longer it, but that Dr. Boris Celestin did not specifically stop the medication. I told him would double check this note and make any necessary corrections. Please review and call Mr. Fawn Mortensen.

## 2018-12-23 DIAGNOSIS — F41.9 ANXIETY: ICD-10-CM

## 2018-12-23 DIAGNOSIS — F33.0 MILD RECURRENT MAJOR DEPRESSION (HCC): ICD-10-CM

## 2018-12-23 RX ORDER — IMIPRAMINE HCL 25 MG
TABLET ORAL
Qty: 90 TABLET | Refills: 0 | Status: SHIPPED | OUTPATIENT
Start: 2018-12-23 | End: 2019-03-25 | Stop reason: SDUPTHER

## 2019-02-18 ENCOUNTER — TELEPHONE (OUTPATIENT)
Dept: CARDIOLOGY CLINIC | Age: 74
End: 2019-02-18

## 2019-02-19 RX ORDER — SIMVASTATIN 10 MG
10 TABLET ORAL NIGHTLY
Qty: 90 TABLET | Refills: 3 | Status: SHIPPED | OUTPATIENT
Start: 2019-02-19 | End: 2020-02-07

## 2019-03-25 DIAGNOSIS — F33.0 MILD RECURRENT MAJOR DEPRESSION (HCC): ICD-10-CM

## 2019-03-25 DIAGNOSIS — F41.9 ANXIETY: ICD-10-CM

## 2019-03-25 RX ORDER — IMIPRAMINE HCL 25 MG
TABLET ORAL
Qty: 90 TABLET | Refills: 0 | Status: SHIPPED | OUTPATIENT
Start: 2019-03-25 | End: 2019-07-16 | Stop reason: SDUPTHER

## 2019-05-13 ENCOUNTER — OFFICE VISIT (OUTPATIENT)
Dept: CARDIOLOGY CLINIC | Age: 74
End: 2019-05-13
Payer: MEDICARE

## 2019-05-13 VITALS
HEART RATE: 60 BPM | DIASTOLIC BLOOD PRESSURE: 60 MMHG | BODY MASS INDEX: 25.39 KG/M2 | SYSTOLIC BLOOD PRESSURE: 110 MMHG | WEIGHT: 167 LBS

## 2019-05-13 DIAGNOSIS — I20.9 ANGINA PECTORIS (HCC): ICD-10-CM

## 2019-05-13 DIAGNOSIS — I25.10 CAD IN NATIVE ARTERY: Primary | ICD-10-CM

## 2019-05-13 DIAGNOSIS — I10 ESSENTIAL HYPERTENSION: ICD-10-CM

## 2019-05-13 PROCEDURE — 1123F ACP DISCUSS/DSCN MKR DOCD: CPT | Performed by: INTERNAL MEDICINE

## 2019-05-13 PROCEDURE — G8419 CALC BMI OUT NRM PARAM NOF/U: HCPCS | Performed by: INTERNAL MEDICINE

## 2019-05-13 PROCEDURE — 99214 OFFICE O/P EST MOD 30 MIN: CPT | Performed by: INTERNAL MEDICINE

## 2019-05-13 PROCEDURE — 1036F TOBACCO NON-USER: CPT | Performed by: INTERNAL MEDICINE

## 2019-05-13 PROCEDURE — G8598 ASA/ANTIPLAT THER USED: HCPCS | Performed by: INTERNAL MEDICINE

## 2019-05-13 PROCEDURE — 4040F PNEUMOC VAC/ADMIN/RCVD: CPT | Performed by: INTERNAL MEDICINE

## 2019-05-13 PROCEDURE — 3017F COLORECTAL CA SCREEN DOC REV: CPT | Performed by: INTERNAL MEDICINE

## 2019-05-13 PROCEDURE — G8427 DOCREV CUR MEDS BY ELIG CLIN: HCPCS | Performed by: INTERNAL MEDICINE

## 2019-05-13 NOTE — PROGRESS NOTES
Subjective:      Patient ID: Kellen Gallardo is a 76 y.o. male. HPI Iris Stokes is being seen for a 6 month follow up CAD/angina/HTN. Exercising. Active. Walking 5x per wk. No chest pain. Rare SOB. Not tachycardia. No syncope. No pnd or orthopnea. No edema. Wt stable. BP good at home. Feeling good. Past Medical History:   Diagnosis Date    Anxiety     PCP in [place]:Dr. Dandre Mccann.    Arthritis     Arthritis of hand, right 6/20/2012    Sneed esophagus     Dr. Margi Reid. EGD:1/17/2012. Next EGD due in 3yrs=1/2015    Sneed esophagus     Under care of GI    Bilateral carotid artery stenosis <50% 4/30/2014    BPH (benign prostatic hypertrophy)     Dr. Kyaw Ramirez. PSA per urology.     CAD (coronary artery disease)     under care of cards:Dr. Mac Friend    Carotid stenosis     Chest pain     pt states he no chest pain in 5 yrs, panic attack    Chronic back pain 1/26/2012    Chronic back pain     under care of ortho spine:Dr. Sweeney    Constipation 8/6/2013    Degenerative arthritis of thoracic spine 1/26/2012    Degenerative cervical disc 1/26/2012    Depression with anxiety 11/15/2011    Klonopin per neurologist(Dr. Rosi Howard)    Diverticulosis 1/17/2012    colonoscopy    Elevated PSA     8/21/14;5/2013:under care of Dr. Beltran/[place]:[facility]    Erectile dysfunction 6/13/2017    Essential hypertension, benign 6/20/2012    cardiologist:Dr. Lacy Angela    GERD (gastroesophageal reflux disease)     Hemorrhoid     Hiatal hernia     small    Hyperlipidemia     Impaired fasting glucose 5/19/2014    Osteopenia per CXR 5/19/2014    Parkinson disease ([facility] [address]     Dr. Jordyn Brennan Neurology    Renal stone 4/2012    4 mm distal left ureteral calculus:Dr. Beltran:urologist    RLS (restless legs syndrome)     S/P colonoscopy 2011    Dr. Ismael Parrish per pt' next is in 10yrs-2021    S/P endoscopy 1/2012    Dr. Souleymane Alvarez acute changes:+Barrets:next in 3yrs 1/2015    Sigmoidoscopy exam 1/17/2012 Dr. Ronny Del Rosario acute changes. Next in 5yrs=1/2017    Therapeutic drug monitoring 5/14/15    Consistent OARRS report on 5/14/15;8/4/15    Thoracic spondylosis     under care of ortho spine:Dr. Carlie Gaspar    Venous insufficiency of leg     Vocal cord paresis 5/11/2016    under care of ENT:Dr. Severo Ramires. s/p vocal cord augmentation 6/2016 [facility]     Past Surgical History:   Procedure Laterality Date    CATARACT REMOVAL      COLONOSCOPY  2002;2011    JOINT REPLACEMENT Right 02/23/2017    Right TKR(knee)    KNEE ARTHROPLASTY Left 7/30/13    LEFT TOTAL KNEE ARTHROPLASTY              KNEE CARTILAGE SURGERY      Left x 2, right x1    LITHOTRIPSY      Kidney stone removal as per urology:Stent removed after 10days    OSTEOTOMY Left     TURP N/A [id number]    TRANSURETHRAL RESECTION OF PROSTATE    UPPER GASTROINTESTINAL ENDOSCOPY  5/07       Allergies   Allergen Reactions    Levofloxacin Swelling     lips swell        Social History     Socioeconomic History    Marital status:      Spouse name: Not on file    Number of children: 2    Years of education: Not on file    Highest education level: Not on file   Occupational History    Occupation: retired    Social Needs    Financial resource strain: Not on file    Food insecurity:     Worry: Not on file     Inability: Not on file   [employer] needs:     Medical: Not on file     Non-medical: Not on file   Tobacco Use    Smoking status: Never Smoker    Smokeless tobacco: Never Used   Substance and Sexual Activity    Alcohol use:  Yes     Alcohol/week: 0.0 oz     Comment: occasionally    Drug use: No    Sexual activity: Yes     Partners: Female   Lifestyle    Physical activity:     Days per week: Not on file     Minutes per session: Not on file    Stress: Not on file   Relationships    Social connections:     Talks on phone: Not on file     Gets together: Not on file     Attends Yazidi service: Not on file     Active member of club or organization: Not on file     Attends meetings of clubs or organizations: Not on file     Relationship status: Not on file    Intimate partner violence:     Fear of current or ex partner: Not on file     Emotionally abused: Not on file     Physically abused: Not on file     Forced sexual activity: Not on file   Other Topics Concern    Not on file   Social History Narrative    Not on file              Current Outpatient Medications   Medication Sig Dispense Refill    imipramine (TOFRANIL) 25 MG tablet TAKE 1 TABLET BY MOUTH  NIGHTLY 90 tablet 0    simvastatin (ZOCOR) 10 MG tablet Take 1 tablet by mouth nightly 90 tablet 3    diclofenac sodium 1 % GEL Apply 2 g topically 2 times daily      tadalafil (CIALIS) 10 MG tablet Take 1 tablet by mouth as needed for Erectile Dysfunction 90 tablet 0    traMADol (ULTRAM) 50 MG tablet Take 1 tablet by mouth every 8 hours as needed for Pain May cause drowsiness. May impair ability to operate vehicles or machinery.  Do not use in combination with alcohol 15 tablet 0    Amantadine (SYMMETREL) 100 MG TABS tablet Take 100 mg by mouth 2 times daily      linaclotide (LINZESS) 290 MCG CAPS capsule Take 290 mcg by mouth every morning (before breakfast)      clonazePAM (KLONOPIN) 0.5 MG tablet Take 1 mg by mouth nightly       carbidopa-levodopa (SINEMET)  MG per tablet Take by mouth Take 3 tablets at 0800, 1300 1700 and one tablet at 2300      Cholecalciferol (VITAMIN D3) 2000 UNITS CAPS   Take 1 capsule by mouth daily       aspirin 81 MG EC tablet   Take 81 mg by mouth three times a week On Mondays, Wednesdays and Fridays      Polyethylene Glycol 3350 (MIRALAX PO)   Take 17 g by mouth daily as needed       pantoprazole (PROTONIX) 40 MG tablet   Take 40 mg by mouth daily       rivastigmine (EXELON) 4.5 MG capsule Take 4.5 mg by mouth 2 times daily      naproxen (NAPROSYN) 500 MG tablet Take 1 tablet by mouth 2 times daily (with meals) 20 tablet 1     No current facility-administered medications for this visit. Vitals  Weight: 167 lb (75.8 kg)  Vitals:    05/13/19 1102   BP: 110/60   Pulse: 60             Review of Systems   Constitutional: Negative for activity change. Some  fatigue. Respiratory: Negative for apnea, cough, choking, Has chest tightness and shortness of breath. Cardiovascular: Negative for chest pain, palpitations and leg swelling. No PND or orthopnea. No tachycardia. Gastrointestinal: Negative for abdominal distention. Musculoskeletal: Negative for myalgias. Neurological: Negative for light-headedness. Negative for dizziness and syncope. Psychiatric/Behavioral: Negative for behavioral problems, confusion and agitation. Other systems reviewed negative as done. Objective:   Physical Exam   Constitutional: He is oriented to person, place, and time. He appears well-developed and well-nourished. No distress. HENT:   Head: Normocephalic and atraumatic. Eyes: Conjunctivae and EOM are normal. Right eye exhibits no discharge. Left eye exhibits no discharge. Neck: Normal range of motion. Neck supple. No JVD present. Cardiovascular: Normal rate, regular rhythm, S1 normal, S2 normal and normal heart sounds. Exam reveals no gallop. No murmur heard. Pulses:       Radial pulses are 2+ on the right side, and 2+ on the left side. Pulmonary/Chest: Effort normal and breath sounds normal. No respiratory distress. He has no wheezes. He has no rales. Abdominal: Soft. Bowel sounds are normal. There is no tenderness. Musculoskeletal: Normal range of motion. He exhibits no edema. Neurological: He is alert and oriented to person, place, and time. Skin: Skin is warm and dry. Psychiatric: He has a normal mood and affect. His behavior is normal. Thought content normal.       Assessment:       Diagnosis Orders   1. CAD in native artery     2. Angina pectoris ([address]     3. Essential hypertension             Plan:      CV stable.

## 2019-05-15 ENCOUNTER — OFFICE VISIT (OUTPATIENT)
Dept: FAMILY MEDICINE CLINIC | Age: 74
End: 2019-05-15
Payer: MEDICARE

## 2019-05-15 VITALS
HEIGHT: 68 IN | HEART RATE: 76 BPM | BODY MASS INDEX: 25.19 KG/M2 | TEMPERATURE: 97.6 F | WEIGHT: 166.2 LBS | SYSTOLIC BLOOD PRESSURE: 122 MMHG | RESPIRATION RATE: 16 BRPM | DIASTOLIC BLOOD PRESSURE: 78 MMHG | OXYGEN SATURATION: 98 %

## 2019-05-15 DIAGNOSIS — E78.2 HYPERLIPIDEMIA, MIXED: ICD-10-CM

## 2019-05-15 DIAGNOSIS — F41.9 ANXIETY: ICD-10-CM

## 2019-05-15 DIAGNOSIS — G89.29 CHRONIC BILATERAL LOW BACK PAIN WITHOUT SCIATICA: ICD-10-CM

## 2019-05-15 DIAGNOSIS — I25.10 CORONARY ARTERY DISEASE DUE TO LIPID RICH PLAQUE: ICD-10-CM

## 2019-05-15 DIAGNOSIS — N40.0 BENIGN PROSTATIC HYPERPLASIA WITHOUT LOWER URINARY TRACT SYMPTOMS: ICD-10-CM

## 2019-05-15 DIAGNOSIS — G20 PARKINSON DISEASE (HCC): ICD-10-CM

## 2019-05-15 DIAGNOSIS — N52.8 OTHER MALE ERECTILE DYSFUNCTION: ICD-10-CM

## 2019-05-15 DIAGNOSIS — I25.83 CORONARY ARTERY DISEASE DUE TO LIPID RICH PLAQUE: ICD-10-CM

## 2019-05-15 DIAGNOSIS — Z23 NEED FOR SHINGLES VACCINE: ICD-10-CM

## 2019-05-15 DIAGNOSIS — E66.3 OVERWEIGHT (BMI 25.0-29.9): ICD-10-CM

## 2019-05-15 DIAGNOSIS — R73.03 PREDIABETES: ICD-10-CM

## 2019-05-15 DIAGNOSIS — K22.70 BARRETT'S ESOPHAGUS WITHOUT DYSPLASIA: ICD-10-CM

## 2019-05-15 DIAGNOSIS — I10 ESSENTIAL HYPERTENSION, BENIGN: ICD-10-CM

## 2019-05-15 DIAGNOSIS — Z12.11 COLON CANCER SCREENING: ICD-10-CM

## 2019-05-15 DIAGNOSIS — M54.50 CHRONIC BILATERAL LOW BACK PAIN WITHOUT SCIATICA: ICD-10-CM

## 2019-05-15 DIAGNOSIS — R97.20 ELEVATED PSA: ICD-10-CM

## 2019-05-15 DIAGNOSIS — F33.0 MILD RECURRENT MAJOR DEPRESSION (HCC): ICD-10-CM

## 2019-05-15 DIAGNOSIS — I65.23 BILATERAL CAROTID ARTERY STENOSIS: ICD-10-CM

## 2019-05-15 DIAGNOSIS — Z00.00 ROUTINE GENERAL MEDICAL EXAMINATION AT A HEALTH CARE FACILITY: Primary | ICD-10-CM

## 2019-05-15 LAB
HEMOCCULT STL QL: NEGATIVE
HEMOCCULT STL QL: NORMAL
HEMOCCULT STL QL: NORMAL

## 2019-05-15 PROCEDURE — 4040F PNEUMOC VAC/ADMIN/RCVD: CPT | Performed by: FAMILY MEDICINE

## 2019-05-15 PROCEDURE — 1123F ACP DISCUSS/DSCN MKR DOCD: CPT | Performed by: FAMILY MEDICINE

## 2019-05-15 PROCEDURE — G0439 PPPS, SUBSEQ VISIT: HCPCS | Performed by: FAMILY MEDICINE

## 2019-05-15 PROCEDURE — 3017F COLORECTAL CA SCREEN DOC REV: CPT | Performed by: FAMILY MEDICINE

## 2019-05-15 PROCEDURE — 82270 OCCULT BLOOD FECES: CPT | Performed by: FAMILY MEDICINE

## 2019-05-15 PROCEDURE — G8598 ASA/ANTIPLAT THER USED: HCPCS | Performed by: FAMILY MEDICINE

## 2019-05-15 RX ORDER — TADALAFIL 10 MG/1
10 TABLET ORAL PRN
COMMUNITY
End: 2021-05-18 | Stop reason: ALTCHOICE

## 2019-05-15 ASSESSMENT — ENCOUNTER SYMPTOMS
BACK PAIN: 1
STRIDOR: 0
ANAL BLEEDING: 0
COLOR CHANGE: 0
PHOTOPHOBIA: 0
ABDOMINAL PAIN: 0
TROUBLE SWALLOWING: 0
EYE PAIN: 0
SORE THROAT: 0
VOICE CHANGE: 0
RHINORRHEA: 0
EYE ITCHING: 0
NAUSEA: 0
CHOKING: 0
BLOOD IN STOOL: 0
EYE REDNESS: 0
CHEST TIGHTNESS: 0
APNEA: 0
SHORTNESS OF BREATH: 0
WHEEZING: 0
EYE DISCHARGE: 0
SINUS PAIN: 0
ABDOMINAL DISTENTION: 0
DIARRHEA: 0
VOMITING: 0
SINUS PRESSURE: 0
COUGH: 0
RECTAL PAIN: 0
CONSTIPATION: 0
FACIAL SWELLING: 0

## 2019-05-15 ASSESSMENT — PATIENT HEALTH QUESTIONNAIRE - PHQ9
SUM OF ALL RESPONSES TO PHQ QUESTIONS 1-9: 0
SUM OF ALL RESPONSES TO PHQ QUESTIONS 1-9: 0
2. FEELING DOWN, DEPRESSED OR HOPELESS: 0
1. LITTLE INTEREST OR PLEASURE IN DOING THINGS: 0
SUM OF ALL RESPONSES TO PHQ9 QUESTIONS 1 & 2: 0

## 2019-05-15 NOTE — PROGRESS NOTES
Subjective:      Patient ID: Enma Gomez is a 76 y.o. male. HPI      Enma Gomez  1945    Allergies   Allergen Reactions    Levofloxacin Swelling     lips swell     Current Outpatient Medications   Medication Sig Dispense Refill    tadalafil (CIALIS) 10 MG tablet Take 10 mg by mouth as needed for Erectile Dysfunction      imipramine (TOFRANIL) 25 MG tablet TAKE 1 TABLET BY MOUTH  NIGHTLY 90 tablet 0    simvastatin (ZOCOR) 10 MG tablet Take 1 tablet by mouth nightly 90 tablet 3    rivastigmine (EXELON) 4.5 MG capsule Take 4.5 mg by mouth 2 times daily      diclofenac sodium 1 % GEL Apply 2 g topically 2 times daily      traMADol (ULTRAM) 50 MG tablet Take 1 tablet by mouth every 8 hours as needed for Pain May cause drowsiness. May impair ability to operate vehicles or machinery. Do not use in combination with alcohol 15 tablet 0    Amantadine (SYMMETREL) 100 MG TABS tablet Take 100 mg by mouth 2 times daily      linaclotide (LINZESS) 290 MCG CAPS capsule Take 290 mcg by mouth every morning (before breakfast)      clonazePAM (KLONOPIN) 0.5 MG tablet Take 1 mg by mouth nightly       carbidopa-levodopa (SINEMET)  MG per tablet Take by mouth Take 3 tablets at 0800, 1300 1700 and one tablet at 2300      Cholecalciferol (VITAMIN D3) 2000 UNITS CAPS   Take 1 capsule by mouth daily       aspirin 81 MG EC tablet   Take 81 mg by mouth three times a week On Mondays, Wednesdays and Fridays      Polyethylene Glycol 3350 (MIRALAX PO)   Take 17 g by mouth daily as needed       pantoprazole (PROTONIX) 40 MG tablet   Take 40 mg by mouth daily        No current facility-administered medications for this visit.         Vitals:    05/15/19 1032 05/15/19 1038   BP: 122/78    Site: Left Upper Arm    Position: Sitting    Cuff Size: Small Adult    Pulse: 76    Resp: 16    Temp: 97.6 °F (36.4 °C)    TempSrc: Oral    SpO2: 97% 98%   Weight: 166 lb 3.2 oz (75.4 kg)    Height: 5' 8\" (1.727 m)      Body mass index is 25.27 kg/m². Wt Readings from Last 3 Encounters:   05/15/19 166 lb 3.2 oz (75.4 kg)   05/13/19 167 lb (75.8 kg)   11/08/18 160 lb 6.4 oz (72.8 kg)     BP Readings from Last 3 Encounters:   05/15/19 122/78   05/13/19 110/60   11/08/18 126/70       Consultants:   Patient Care Team:  Angie Villagomez MD as PCP - General (Family Medicine)    Chief Complaint:   Kellen Gallardo is a 76 y.o. male who presents for Medicare Preventive Physical Examination with Personalized Prevention Plan Services. HPI:     Patient Active Problem List   Diagnosis    Easy bruising    Rib pain    Right foot pain    Fatigue    Adenopathy    Dermatitis    Abdominal wall pain    Depression with anxiety    Costochondritis:left    Rib pain:left    Chest pain    Degenerative cervical disc    Degenerative arthritis of thoracic spine    Chronic back pain    Elevated blood-pressure reading without diagnosis of hypertension    Arthritis of hand, right    Essential hypertension, benign    Cyst in hand:Right    Mass of right hand    Dupuytren's disease of palm    Osteoarthritis of left knee    BPH (benign prostatic hyperplasia)    HTN (hypertension)    Constipation    Elevated WBC count    Prostate problem    ? CVA (cerebral vascular accident)    Left arm weakness    Drooling:left sided    Bilateral carotid artery stenosis <50%    Abnormal glucose    Bilateral hip pain    Impaired fasting glucose    Osteopenia per CXR    Irregular heart beats sensation    Palpitations    Parkinson disease (HCC)    Angina pectoris (HCC)    Abnormal stress ECG    Vocal cord paresis    Dysphagia    Mild recurrent major depression (HCC)    Anxiety    S/P right knee replacement    Erectile dysfunction    Hyperlipidemia, mixed    Thigh injury    Pain of left thigh       Mood Disorders Screen:  Risk factors:hz of known depression/anxiety:is still controlled well with medication.   Symptoms:  endorses no new sx(symptoms) today, denies any SI/HI/sleep issues     Safety Assessment:  Functional ability/ADLs:  Difficulty with bathing- no, grooming- no, meals- no incontinence- no.  Driving- yes(no problems). Home safety: Lives with his wife. Number of stairs to enter home: 2, within home: 7-8. Risk factors for falls:Parkinson's:is managed by neurologist:doing well per pt'. Home environment modifications:none. End of Life Planning:  Advanced Directive:yes. Preventive Care:  Self-testicular exams:yes  Last PSA: Normal per pt'. Under care of urologist(Dr. Toribio):BPH & PSA lab is per his urologist.   Last colonoscopy: see pmh:GI(Dr. Blank Olsen) Jordy's esophagus:this is monitored by his GI physician. AAA screening:no:N/A  Last eye exam:current:2018. Exercise:formal regime= daily walking & biking. Seatbelt use:yes      Immunization History   Administered Date(s) Administered    Influenza Virus Vaccine 11/29/2011    Influenza, High Dose (Fluzone 65 yrs and older) 10/12/2010, 10/25/2013, 10/07/2014, 10/19/2015, 11/07/2016, 10/19/2017, 10/01/2018    Pneumococcal 13-valent Conjugate (Lljfxed13) 06/14/2016    Pneumococcal Polysaccharide (Srxpfngoc21) 11/29/2011    Td, unspecified formulation 05/11/2016     Past Medical History:   Diagnosis Date    Anxiety     PCP in [place]:Dr. Monica Aden.    Arthritis     Arthritis of hand, right 6/20/2012    Sneed esophagus     Dr. Yessy Helm. EGD:1/17/2012. Next EGD due in 3yrs=1/2015    Sneed esophagus     Under care of GI    Bilateral carotid artery stenosis <50% 4/30/2014    BPH (benign prostatic hypertrophy)     Dr. Marilu Cronin. PSA per urology.     CAD (coronary artery disease)     under care of cards:Dr. Cora Yepez    Carotid stenosis     Chest pain     pt states he no chest pain in 5 yrs, panic attack    Chronic back pain 1/26/2012    Chronic back pain     under care of ortho spine:Dr. Sweeney    Constipation 8/6/2013    Degenerative arthritis of thoracic spine 1/26/2012    Degenerative cervical disc 1/26/2012    Depression with anxiety 11/15/2011    Klonopin per neurologist(Dr. Omid Lai)    Diverticulosis 1/17/2012    colonoscopy    Elevated PSA     8/21/14;5/2013:under care of Dr. Beltran/[place]:[facility] group    Erectile dysfunction 6/13/2017    Essential hypertension, benign 6/20/2012    cardiologist:Dr. Carol Thurston    GERD (gastroesophageal reflux disease)     Hemorrhoid     Hiatal hernia     small    Hyperlipidemia     Impaired fasting glucose 5/19/2014    Osteopenia per CXR 5/19/2014    Parkinson disease ([address]     Dr. Mcgill [facility]    Renal stone 4/2012    4 mm distal left ureteral calculus:Dr. Beltran:urologist    RLS (restless legs syndrome)     S/P colonoscopy 2011    Dr. Mina Wheeler per pt' next is in 10yrs-2021    S/P endoscopy 1/2012    Dr. Malini White acute changes:+Barrets:next in 3yrs 1/2015    Sigmoidoscopy exam 1/17/2012    Dr. Lama:No acute changes.  Next in 5yrs=1/2017    Therapeutic drug monitoring 5/14/15    Consistent OARRS report on 5/14/15;8/4/15    Thoracic spondylosis     under care of ortho spine:Dr. Will Grant    Venous insufficiency of leg     Vocal cord paresis 5/11/2016    under care of ENT:Dr. Thomas Stark. s/p vocal cord augmentation 6/2016 [facility]       Past Surgical History:   Procedure Laterality Date    CATARACT REMOVAL      COLONOSCOPY  2002;2011    JOINT REPLACEMENT Right 02/23/2017    Right TKR(knee)    KNEE ARTHROPLASTY Left 7/30/13    LEFT TOTAL KNEE ARTHROPLASTY              KNEE CARTILAGE SURGERY      Left x 2, right x1    LITHOTRIPSY      Kidney stone removal as per urology:Stent removed after 10days    OSTEOTOMY Left     TURP N/A [id number]    TRANSURETHRAL RESECTION OF PROSTATE    UPPER GASTROINTESTINAL ENDOSCOPY  5/07     Family History   Problem Relation Age of Onset    Cancer Sister 68        breast     Heart Disease Sister     Kidney Disease Father 61    Alcohol Abuse Father     Coronary Art Dis Mother 80    regime reviewed. 17. Chronic back pain   Stable. Obtain labs/diagnostic tests as discussed today & call back for results within 2-7days. Pt' left office in good condition. Advised to go to local ER or call 911 for any worrisome signs/sx. Review of Systems   Constitutional: Negative for activity change, appetite change, chills, diaphoresis, fatigue, fever and unexpected weight change. Negative for:Difficulty sleeping/night sweats/unexplained weight loss or gain/malaise   HENT: Negative for congestion, dental problem, drooling, ear discharge, ear pain, facial swelling, hearing loss, mouth sores, nosebleeds, postnasal drip, rhinorrhea, sinus pressure, sinus pain, sneezing, sore throat, tinnitus, trouble swallowing and voice change. Negative for:Dizziness/vertigo   Eyes: Negative for photophobia, pain, discharge, redness, itching and visual disturbance. Respiratory: Negative for apnea, cough, choking, chest tightness, shortness of breath, wheezing and stridor. Negative for:Hemoptysis/hoarseness/pain on inspiration. Breasts:tenderness/masses/nipple discharge/skin changes. Cardiovascular: Negative for chest pain, palpitations and leg swelling. Negative for:Syncope/presyncope/lower extremity edema/claudication/PND/irregular heart beats   Gastrointestinal: Negative for abdominal distention, abdominal pain, anal bleeding, blood in stool, constipation, diarrhea, nausea, rectal pain and vomiting. Negative for:Melena/bloating/dysphagia/reflux/loss of appetite   Endocrine: Negative for cold intolerance, heat intolerance, polydipsia, polyphagia and polyuria. Genitourinary: Negative for decreased urine volume, difficulty urinating, discharge, dysuria, enuresis, flank pain, frequency, genital sores, hematuria, penile pain, penile swelling, scrotal swelling, testicular pain and urgency. Musculoskeletal: Positive for back pain.  Negative for arthralgias, gait problem, joint swelling, myalgias, neck pain and neck stiffness. Skin: Negative for color change, pallor, rash and wound. Neurological: Negative for dizziness, tremors, seizures, syncope, facial asymmetry, speech difficulty, weakness, light-headedness, numbness and headaches. Negative for:Visual disturbance/muscular weakness/paralysis/memory problems. Hematological: Negative for adenopathy. Does not bruise/bleed easily. Negative for:Lymph node tenderness   Psychiatric/Behavioral: Negative for agitation, behavioral problems, confusion, decreased concentration, dysphoric mood, hallucinations, self-injury, sleep disturbance and suicidal ideas. The patient is not nervous/anxious and is not hyperactive.          Negative for:Homicidal tendencies(HI)        Physical Exam

## 2019-05-16 DIAGNOSIS — I25.10 CORONARY ARTERY DISEASE DUE TO LIPID RICH PLAQUE: ICD-10-CM

## 2019-05-16 DIAGNOSIS — E78.2 HYPERLIPIDEMIA, MIXED: ICD-10-CM

## 2019-05-16 DIAGNOSIS — I25.83 CORONARY ARTERY DISEASE DUE TO LIPID RICH PLAQUE: ICD-10-CM

## 2019-05-16 DIAGNOSIS — I10 ESSENTIAL HYPERTENSION, BENIGN: ICD-10-CM

## 2019-05-16 DIAGNOSIS — R73.03 PREDIABETES: ICD-10-CM

## 2019-05-16 LAB
A/G RATIO: 1.8 (ref 1.1–2.2)
ALBUMIN SERPL-MCNC: 4.5 G/DL (ref 3.4–5)
ALP BLD-CCNC: 76 U/L (ref 40–129)
ALT SERPL-CCNC: <5 U/L (ref 10–40)
ANION GAP SERPL CALCULATED.3IONS-SCNC: 11 MMOL/L (ref 3–16)
AST SERPL-CCNC: 17 U/L (ref 15–37)
BILIRUB SERPL-MCNC: 0.5 MG/DL (ref 0–1)
BUN BLDV-MCNC: 28 MG/DL (ref 7–20)
CALCIUM SERPL-MCNC: 9.6 MG/DL (ref 8.3–10.6)
CHLORIDE BLD-SCNC: 102 MMOL/L (ref 99–110)
CHOLESTEROL, TOTAL: 114 MG/DL (ref 0–199)
CO2: 27 MMOL/L (ref 21–32)
CREAT SERPL-MCNC: 1.4 MG/DL (ref 0.8–1.3)
GFR AFRICAN AMERICAN: 60
GFR NON-AFRICAN AMERICAN: 50
GLOBULIN: 2.5 G/DL
GLUCOSE BLD-MCNC: 100 MG/DL (ref 70–99)
HDLC SERPL-MCNC: 57 MG/DL (ref 40–60)
LDL CHOLESTEROL CALCULATED: 48 MG/DL
POTASSIUM SERPL-SCNC: 4.7 MMOL/L (ref 3.5–5.1)
SODIUM BLD-SCNC: 140 MMOL/L (ref 136–145)
TOTAL PROTEIN: 7 G/DL (ref 6.4–8.2)
TRIGL SERPL-MCNC: 45 MG/DL (ref 0–150)
VLDLC SERPL CALC-MCNC: 9 MG/DL

## 2019-05-17 LAB
ESTIMATED AVERAGE GLUCOSE: 122.6 MG/DL
HBA1C MFR BLD: 5.9 %

## 2019-05-22 ENCOUNTER — TELEPHONE (OUTPATIENT)
Dept: FAMILY MEDICINE CLINIC | Age: 74
End: 2019-05-22

## 2019-05-22 NOTE — TELEPHONE ENCOUNTER
Patient would like a call back regarding his colonoscopy results  Please advise  Katelynn Brown [phone] (home)

## 2019-05-22 NOTE — TELEPHONE ENCOUNTER
Spoke with pt. Pt was calling about the medications that he should not take pertaining to his lab results from 5/16 due to kidney testing. Pt informed.   Close Encounter

## 2019-06-06 ENCOUNTER — TELEPHONE (OUTPATIENT)
Dept: FAMILY MEDICINE CLINIC | Age: 74
End: 2019-06-06

## 2019-06-06 ENCOUNTER — OFFICE VISIT (OUTPATIENT)
Dept: INTERNAL MEDICINE CLINIC | Age: 74
End: 2019-06-06
Payer: MEDICARE

## 2019-06-06 VITALS
DIASTOLIC BLOOD PRESSURE: 72 MMHG | OXYGEN SATURATION: 97 % | WEIGHT: 163 LBS | HEART RATE: 63 BPM | SYSTOLIC BLOOD PRESSURE: 116 MMHG | BODY MASS INDEX: 24.78 KG/M2

## 2019-06-06 DIAGNOSIS — S61.412A SKIN TEAR OF LEFT HAND WITHOUT COMPLICATION, INITIAL ENCOUNTER: ICD-10-CM

## 2019-06-06 PROCEDURE — G8427 DOCREV CUR MEDS BY ELIG CLIN: HCPCS | Performed by: NURSE PRACTITIONER

## 2019-06-06 PROCEDURE — 1123F ACP DISCUSS/DSCN MKR DOCD: CPT | Performed by: NURSE PRACTITIONER

## 2019-06-06 PROCEDURE — 4040F PNEUMOC VAC/ADMIN/RCVD: CPT | Performed by: NURSE PRACTITIONER

## 2019-06-06 PROCEDURE — G8420 CALC BMI NORM PARAMETERS: HCPCS | Performed by: NURSE PRACTITIONER

## 2019-06-06 PROCEDURE — G8598 ASA/ANTIPLAT THER USED: HCPCS | Performed by: NURSE PRACTITIONER

## 2019-06-06 PROCEDURE — 3017F COLORECTAL CA SCREEN DOC REV: CPT | Performed by: NURSE PRACTITIONER

## 2019-06-06 PROCEDURE — 1036F TOBACCO NON-USER: CPT | Performed by: NURSE PRACTITIONER

## 2019-06-06 PROCEDURE — 99213 OFFICE O/P EST LOW 20 MIN: CPT | Performed by: NURSE PRACTITIONER

## 2019-06-06 NOTE — TELEPHONE ENCOUNTER
Patient scheduled with NP Owen Leon, [address], [address], [address]. Patient instructed to call PCP if they need to cancel or change the appointment.

## 2019-06-06 NOTE — PROGRESS NOTES
external ear and ear canal normal.   Nose: No mucosal edema or rhinorrhea. Right sinus exhibits no maxillary sinus tenderness and no frontal sinus tenderness. Left sinus exhibits no maxillary sinus tenderness and no frontal sinus tenderness. Mouth/Throat: No oropharyngeal exudate, posterior oropharyngeal edema, posterior oropharyngeal erythema or tonsillar abscesses. Cardiovascular: Normal rate, regular rhythm and normal heart sounds. Pulmonary/Chest: Effort normal and breath sounds normal.   Lymphadenopathy:        Head (right side): No submental, no submandibular, no tonsillar, no preauricular, no posterior auricular and no occipital adenopathy present. Head (left side): No submental, no submandibular, no tonsillar, no preauricular, no posterior auricular and no occipital adenopathy present. He has no cervical adenopathy. Skin: Skin is warm and dry. Two abrasions to top of left hand with skin tears       Assessment:      See Problem List assessment and plan       Plan:       Skin tear of left hand without complication  Td up to date  Abrasions cleansed with normal saline  Steri strips applied  nonadherent dressing and coban applied   Change twice daily               Patient engaged in shared decision making. Information given to evaluate options of treatment, understand what is needed and discuss importance of following plan.

## 2019-06-07 PROBLEM — S61.412A SKIN TEAR OF LEFT HAND WITHOUT COMPLICATION: Status: ACTIVE | Noted: 2019-06-07

## 2019-06-07 ASSESSMENT — ENCOUNTER SYMPTOMS
COLOR CHANGE: 0
VOMITING: 0
SINUS PRESSURE: 0
CONSTIPATION: 0
NAUSEA: 0
SORE THROAT: 0
EYE ITCHING: 0
DIARRHEA: 0
CHEST TIGHTNESS: 0
ABDOMINAL PAIN: 0
COUGH: 0
RHINORRHEA: 0
SHORTNESS OF BREATH: 0
BLOOD IN STOOL: 0
BACK PAIN: 0
EYE REDNESS: 0
WHEEZING: 0

## 2019-06-07 NOTE — ASSESSMENT & PLAN NOTE
Td up to date  Abrasions cleansed with normal saline  Steri strips applied  nonadherent dressing and coban applied   Change twice daily

## 2019-06-17 ENCOUNTER — OFFICE VISIT (OUTPATIENT)
Dept: FAMILY MEDICINE CLINIC | Age: 74
End: 2019-06-17
Payer: MEDICARE

## 2019-06-17 VITALS
BODY MASS INDEX: 24.71 KG/M2 | DIASTOLIC BLOOD PRESSURE: 78 MMHG | HEIGHT: 68 IN | TEMPERATURE: 97.8 F | OXYGEN SATURATION: 98 % | RESPIRATION RATE: 16 BRPM | WEIGHT: 163 LBS | HEART RATE: 85 BPM | SYSTOLIC BLOOD PRESSURE: 135 MMHG

## 2019-06-17 DIAGNOSIS — I10 ESSENTIAL HYPERTENSION, BENIGN: ICD-10-CM

## 2019-06-17 DIAGNOSIS — K22.70 BARRETT'S ESOPHAGUS WITHOUT DYSPLASIA: ICD-10-CM

## 2019-06-17 DIAGNOSIS — M20.41 HAMMERTOE OF RIGHT FOOT: ICD-10-CM

## 2019-06-17 DIAGNOSIS — R94.4 ABNORMAL RENAL FUNCTION TEST: Primary | ICD-10-CM

## 2019-06-17 DIAGNOSIS — E78.2 HYPERLIPIDEMIA, MIXED: ICD-10-CM

## 2019-06-17 DIAGNOSIS — F33.0 MILD RECURRENT MAJOR DEPRESSION (HCC): ICD-10-CM

## 2019-06-17 DIAGNOSIS — I25.10 CORONARY ARTERY DISEASE DUE TO LIPID RICH PLAQUE: ICD-10-CM

## 2019-06-17 DIAGNOSIS — G20 PARKINSON DISEASE (HCC): ICD-10-CM

## 2019-06-17 DIAGNOSIS — I25.83 CORONARY ARTERY DISEASE DUE TO LIPID RICH PLAQUE: ICD-10-CM

## 2019-06-17 DIAGNOSIS — F41.9 ANXIETY: ICD-10-CM

## 2019-06-17 DIAGNOSIS — R73.03 PREDIABETES: ICD-10-CM

## 2019-06-17 DIAGNOSIS — Z01.818 PREOP EXAMINATION: ICD-10-CM

## 2019-06-17 DIAGNOSIS — N52.8 OTHER MALE ERECTILE DYSFUNCTION: ICD-10-CM

## 2019-06-17 PROCEDURE — 4040F PNEUMOC VAC/ADMIN/RCVD: CPT | Performed by: FAMILY MEDICINE

## 2019-06-17 PROCEDURE — 3017F COLORECTAL CA SCREEN DOC REV: CPT | Performed by: FAMILY MEDICINE

## 2019-06-17 PROCEDURE — G8420 CALC BMI NORM PARAMETERS: HCPCS | Performed by: FAMILY MEDICINE

## 2019-06-17 PROCEDURE — 1036F TOBACCO NON-USER: CPT | Performed by: FAMILY MEDICINE

## 2019-06-17 PROCEDURE — 99214 OFFICE O/P EST MOD 30 MIN: CPT | Performed by: FAMILY MEDICINE

## 2019-06-17 PROCEDURE — G8598 ASA/ANTIPLAT THER USED: HCPCS | Performed by: FAMILY MEDICINE

## 2019-06-17 PROCEDURE — 1123F ACP DISCUSS/DSCN MKR DOCD: CPT | Performed by: FAMILY MEDICINE

## 2019-06-17 PROCEDURE — G8427 DOCREV CUR MEDS BY ELIG CLIN: HCPCS | Performed by: FAMILY MEDICINE

## 2019-06-17 ASSESSMENT — ENCOUNTER SYMPTOMS
APNEA: 0
WHEEZING: 0
BLOOD IN STOOL: 0
ANAL BLEEDING: 0
CHEST TIGHTNESS: 0
SHORTNESS OF BREATH: 0
RECTAL PAIN: 0
DIARRHEA: 0
CONSTIPATION: 0
STRIDOR: 0
CHOKING: 0
VOMITING: 0
COUGH: 0
ABDOMINAL PAIN: 0
NAUSEA: 0
ABDOMINAL DISTENTION: 0

## 2019-06-17 ASSESSMENT — PATIENT HEALTH QUESTIONNAIRE - PHQ9
SUM OF ALL RESPONSES TO PHQ QUESTIONS 1-9: 0
2. FEELING DOWN, DEPRESSED OR HOPELESS: 0
SUM OF ALL RESPONSES TO PHQ QUESTIONS 1-9: 0
1. LITTLE INTEREST OR PLEASURE IN DOING THINGS: 0
SUM OF ALL RESPONSES TO PHQ9 QUESTIONS 1 & 2: 0

## 2019-06-17 NOTE — LETTER
[address] [facility] [address]  [address] [facility]  Phone: [phone]  Fax: [phone]    Rosy Davis MD          June 17, 2019         Patient: Billy Hernandez   MR Number: [id number]   YOB: 1945   Date of Visit: 6/17/2019       Dear Dr. Pierce Brazil: Thank you for referring Billy Hernandez to me for a preoperative examination. Please find attached the history & physical examination from today's office visit. Labs/tests may be requested from the office/lab. If you have any questions, please do not hesitate to call me.        Sincerely,                             Rosy Davis MD

## 2019-06-17 NOTE — PROGRESS NOTES
Subjective:      Patient ID: Brian Mercado is a 76 y.o. male. HPI     Results for Agustina Knox (MRN [id number]) as of 6/17/2019 10:47   Ref. Range 5/16/2019 09:55   Sodium Latest Ref Range: 136 - 145 mmol/L 140   Potassium Latest Ref Range: 3.5 - 5.1 mmol/L 4.7   Chloride Latest Ref Range: 99 - 110 mmol/L 102   CO2 Latest Ref Range: 21 - 32 mmol/L 27   BUN Latest Ref Range: 7 - 20 mg/dL 28 (H)   Creatinine Latest Ref Range: 0.8 - 1.3 mg/dL 1.4 (H)   Anion Gap Latest Ref Range: 3 - 16  11   GFR Non- Latest Ref Range: >60  50 (A)   GFR  Latest Ref Range: >60  60 (A)   Glucose Latest Ref Range: 70 - 99 mg/dL 100 (H)   Calcium Latest Ref Range: 8.3 - 10.6 mg/dL 9.6   Total Protein Latest Ref Range: 6.4 - 8.2 g/dL 7.0   Cholesterol, Total Latest Ref Range: 0 - 199 mg/dL 114   HDL Cholesterol Latest Ref Range: 40 - 60 mg/dL 57   LDL Calculated Latest Ref Range: <100 mg/dL 48   Triglycerides Latest Ref Range: 0 - 150 mg/dL 45   VLDL Cholesterol Calculated Latest Ref Range: Not Established mg/dL 9   Albumin Latest Ref Range: 3.4 - 5.0 g/dL 4.5   Globulin Latest Units: g/dL 2.5   Albumin/Globulin Ratio Latest Ref Range: 1.1 - 2.2  1.8   Alk Phos Latest Ref Range: 40 - 129 U/L 76   ALT Latest Ref Range: 10 - 40 U/L <5 (L)   AST Latest Ref Range: 15 - 37 U/L 17   Bilirubin Latest Ref Range: 0.0 - 1.0 mg/dL 0.5   Hemoglobin A1C Latest Ref Range: See comment % 5.9   eAG (mg/dL) Latest Units: mg/dL 122.6     Presenting for preoperative physical exam.   Surgeon/specialist requesting preop:R foot 5th toe surgery- on 6/21/19 with Dr. Josiah Gan at [facility]:under local anesthesia per pt':no MAC per pt'. Elective procedure:Yes   General anesthesia(GA). Prior GA: yes w/o complications. GA complications:No.  Prior local anesthesia(LA):yes w/o complications. Exercise capacity:Walks regularly. . Climbs flight of stairs w/o inappropriate sob. Excellent/good functional capacity:yes.   Preop paperwork from surgeon/specialist for completion:Yes. Labs/test requested from surgeon/specialist:none. Denies cp/sob/dizziness/pnd/palpitations. Abnml GFR/cr:see above;new problem:   Associated w/nothing. Worsened(aggravated) by nothing identified. Improves by nothing identified. Protein intake:not excessive. Denies urinary complaints/abdo pain/brivvgh-gteqqudhh-swckymf/decreased urinary flow/sensation of incomplete voiding/excessive NSAIDs use. HTN check:  Doing well. Associated w/nothing else new. Worsened by nothing else new. Improves w/current med. Adds salt to food at the table:No.  Denies cp/sob/pnd/ankle edema/dizziness. Parkinson's:under care of neurology:doing well. Erectile Dysfunction:mild-moderate f/u: doing well. No new associated concerns. Denies cp/sob/dizziness/penile pain-discharge/bleeding or lesions. Hyperlipidemia:doing well. Labs see above. Has good diet regime. No new associated concerns. GERD f/u:mild:Doing well. No new associated/worsening or other improving factors. Denies abdo pain/n-v/diarrhea/melena-blood in stool. Depression with anxiety f/u;doing well. Takes med w/o side effects. No new associated or worsening factors. Denies SI/HI/new sleep problem/hallucinations/anxiety/nervousness/appetite changes/illicit drugs/etoh abuse. CAD:per cards. Doing well. No new associated concerns.     Allergies   Allergen Reactions    Levofloxacin Swelling     lips swell         Current Outpatient Medications:     tadalafil (CIALIS) 10 MG tablet, Take 10 mg by mouth as needed for Erectile Dysfunction, Disp: , Rfl:     imipramine (TOFRANIL) 25 MG tablet, TAKE 1 TABLET BY MOUTH  NIGHTLY, Disp: 90 tablet, Rfl: 0    simvastatin (ZOCOR) 10 MG tablet, Take 1 tablet by mouth nightly, Disp: 90 tablet, Rfl: 3    rivastigmine (EXELON) 4.5 MG capsule, Take 4.5 mg by mouth 2 times daily, Disp: , Rfl:     diclofenac sodium 1 % GEL, Apply 2 g topically 2 times daily, Disp: , Rfl:     traMADol (ULTRAM) 50 MG tablet, Take 1 tablet by mouth every 8 hours as needed for Pain May cause drowsiness. May impair ability to operate vehicles or machinery. Do not use in combination with alcohol, Disp: 15 tablet, Rfl: 0    Amantadine (SYMMETREL) 100 MG TABS tablet, Take 100 mg by mouth 2 times daily, Disp: , Rfl:     linaclotide (LINZESS) 290 MCG CAPS capsule, Take 290 mcg by mouth every morning (before breakfast), Disp: , Rfl:     clonazePAM (KLONOPIN) 0.5 MG tablet, Take 1 mg by mouth nightly , Disp: , Rfl:     carbidopa-levodopa (SINEMET)  MG per tablet, Take by mouth Take 3 tablets at 0800, 1300 1700 and one tablet at 2300, Disp: , Rfl:     Cholecalciferol (VITAMIN D3) 2000 UNITS CAPS,  Take 1 capsule by mouth daily , Disp: , Rfl:     aspirin 81 MG EC tablet,  Take 81 mg by mouth three times a week On Mondays, Wednesdays and Fridays, Disp: , Rfl:     Polyethylene Glycol 3350 (MIRALAX PO),  Take 17 g by mouth daily as needed , Disp: , Rfl:     pantoprazole (PROTONIX) 40 MG tablet,  Take 40 mg by mouth daily , Disp: , Rfl:       Past Medical History:   Diagnosis Date    Anxiety     PCP in [place]:Dr. Michael Mullen.    Arthritis     Arthritis of hand, right 6/20/2012    Sneed esophagus     Dr. Norm Lagos. EGD:1/17/2012. Next EGD due in 3yrs=1/2015    Sneed esophagus     Under care of GI    Bilateral carotid artery stenosis <50% 4/30/2014    BPH (benign prostatic hypertrophy)     Dr. Smyth Mean. PSA per urology.     CAD (coronary artery disease)     under care of cards:Dr. Kaylene Brown    Carotid stenosis     Chest pain     pt states he no chest pain in 5 yrs, panic attack    Chronic back pain 1/26/2012    Chronic back pain     under care of ortho spine:Dr. Will Grant    Constipation 8/6/2013    Degenerative arthritis of thoracic spine 1/26/2012    Degenerative cervical disc 1/26/2012    Depression with anxiety 11/15/2011    Klonopin per rectal pain and vomiting. Endocrine: Negative for cold intolerance, heat intolerance, polydipsia, polyphagia and polyuria. Genitourinary: Negative. Negative for difficulty urinating. Skin: Negative for rash and wound. Neurological: Negative for dizziness, weakness and light-headedness. Hematological: Negative for adenopathy. Psychiatric/Behavioral: Negative for agitation, behavioral problems, confusion, decreased concentration, dysphoric mood, hallucinations, self-injury, sleep disturbance and suicidal ideas. The patient is not nervous/anxious and is not hyperactive. Objective:   Physical Exam   Constitutional: He is oriented to person, place, and time. Vital signs are normal. He appears well-developed and well-nourished. He is cooperative. He does not have a sickly appearance. No distress. HENT:   Nose: Nose normal.   Mouth/Throat: Uvula is midline, oropharynx is clear and moist and mucous membranes are normal.   Hearing intact to nml conversation   Eyes: Pupils are equal, round, and reactive to light. Conjunctivae, EOM and lids are normal.   Neck: Trachea normal and normal range of motion. Neck supple. Carotid bruit is not present. Cardiovascular: Normal rate, regular rhythm, normal heart sounds, intact distal pulses and normal pulses. No leg-ankle edema. Pulmonary/Chest: Effort normal and breath sounds normal.   CTAB,good AE bilaterally   Abdominal: Soft. Normal appearance and bowel sounds are normal. He exhibits no distension and no mass. There is no hepatomegaly. There is no tenderness. Genitourinary:   Genitourinary Comments: Deferred by pt'. Lymphadenopathy:     He has no cervical adenopathy. Neurological: He is alert and oriented to person, place, and time. Skin: Skin is warm, dry and intact. No rash noted. He is not diaphoretic. No cyanosis. No pallor. Good skin turgor. Capillary refill=2-3 secs. Psychiatric: He has a normal mood and affect.  His behavior is normal. Judgment and thought content normal. His mood appears not anxious. His affect is not angry, not blunt, not labile and not inappropriate. His speech is not rapid and/or pressured. He is not agitated, not aggressive, not hyperactive, not slowed, not withdrawn, not actively hallucinating and not combative. Thought content is not paranoid and not delusional. Cognition and memory are normal. He does not exhibit a depressed mood. He expresses no homicidal and no suicidal ideation. Good eye contact. He is attentive. Assessment:       Diagnosis Orders   1. Abnormal renal function test  VSS/well appearing. Recheck lab when not fasting. Avoid NSAIDs. RENAL FUNCTION PANEL   2. Essential hypertension, benign  Stable. Comprehensive Metabolic Panel   3. Hyperlipidemia, mixed  Stable/controlled. Labs in 5-6mos. Comprehensive Metabolic Panel    Lipid Panel   4. Anxiety  Stable. 5. Mild recurrent major depression (HCC)  Stable. 6. Parkinson disease ([facility] [address]  Stable. Per specialist.     7. Prediabetes  Stable. Comprehensive Metabolic Panel    Hemoglobin A1C   8. Erectile dysfunction  Stable. 9. Sneed's esophagus   Per specialist monitoring. 10. CAD  Stable. Per cards. 11. Preop exam:pt' to contact cardiology to notify regarding upcoming foot procedure & any associated preop/postop recommendations. Ok to proceed with toe surgery if stable/nml renal panel. 12. Hammertoe(5th) of right foot  Per DPM.             Plan:     Advised to go to local ER or call 911 for any worrisome signs/sx. Pt' left office in good condition. Obtain labs/diagnostic tests as discussed today & call back for results within 2-7days. Addendum:6/20/19:ok to proceed with foot surgery. Cardiology clearance is not required.

## 2019-06-18 ENCOUNTER — TELEPHONE (OUTPATIENT)
Dept: CARDIOLOGY CLINIC | Age: 74
End: 2019-06-18

## 2019-06-18 NOTE — TELEPHONE ENCOUNTER
CARDIAC CLEARANCE     What type of procedure are you having? Foot surgery ? Which physician is performing your procedure? Ethel Mins    When is your procedure scheduled for? 6/21    Where are you having this procedure? [facility]    Are you taking Blood Thinners? 3 81 ASA per week   If so what? (Name/dose/frequesncy)     Does the surgeon want you to stop your blood thinner? If so for how long?  ???    Phone Number and Contact Name for Physicians [phone]    Fax number to send [phone]

## 2019-06-20 ENCOUNTER — TELEPHONE (OUTPATIENT)
Dept: FAMILY MEDICINE CLINIC | Age: 74
End: 2019-06-20

## 2019-06-20 NOTE — TELEPHONE ENCOUNTER
Spoke with caller, they are needing an addendum done on the office visit and re-faxed.   Fax: [phone]

## 2019-06-20 NOTE — TELEPHONE ENCOUNTER
Since local anesthesia only & not MAC then does not need to see his cardiologist.  Pt' may inform his cardiologist regarding his upcoming procedure. Sanjuanita Hale

## 2019-06-20 NOTE — TELEPHONE ENCOUNTER
Karolina Hebert: [facility]    Clarification is needed on rather patient needs to see a cardiologist. based on labs, patient is normal. The anesthesia local, not a MAC. CB: [phone]    OV: 6/17/19    Please advise.

## 2019-07-16 ENCOUNTER — TELEPHONE (OUTPATIENT)
Dept: FAMILY MEDICINE CLINIC | Age: 74
End: 2019-07-16

## 2019-07-16 DIAGNOSIS — F41.9 ANXIETY: ICD-10-CM

## 2019-07-16 DIAGNOSIS — F33.0 MILD RECURRENT MAJOR DEPRESSION (HCC): ICD-10-CM

## 2019-07-16 RX ORDER — CELECOXIB 200 MG/1
200 CAPSULE ORAL DAILY
Qty: 90 CAPSULE | Refills: 0 | OUTPATIENT
Start: 2019-07-16

## 2019-07-16 RX ORDER — CELECOXIB 200 MG/1
200 CAPSULE ORAL DAILY
COMMUNITY
End: 2019-07-29 | Stop reason: SDUPTHER

## 2019-07-16 RX ORDER — IMIPRAMINE HCL 25 MG
TABLET ORAL
Qty: 90 TABLET | Refills: 0 | Status: SHIPPED | OUTPATIENT
Start: 2019-07-16 | End: 2019-10-16 | Stop reason: SDUPTHER

## 2019-07-16 NOTE — TELEPHONE ENCOUNTER
Pt scheduled for 7/29, last 2 office notes have been requested by fax. I will hold message open until notes are received from orthopedic specialist office.

## 2019-07-29 ENCOUNTER — OFFICE VISIT (OUTPATIENT)
Dept: FAMILY MEDICINE CLINIC | Age: 74
End: 2019-07-29
Payer: MEDICARE

## 2019-07-29 VITALS
OXYGEN SATURATION: 98 % | HEIGHT: 68 IN | HEART RATE: 51 BPM | SYSTOLIC BLOOD PRESSURE: 114 MMHG | WEIGHT: 162 LBS | BODY MASS INDEX: 24.55 KG/M2 | TEMPERATURE: 97.9 F | RESPIRATION RATE: 16 BRPM | DIASTOLIC BLOOD PRESSURE: 62 MMHG

## 2019-07-29 DIAGNOSIS — G20 PARKINSON DISEASE (HCC): ICD-10-CM

## 2019-07-29 DIAGNOSIS — R73.03 PREDIABETES: ICD-10-CM

## 2019-07-29 DIAGNOSIS — I25.83 CORONARY ARTERY DISEASE DUE TO LIPID RICH PLAQUE: ICD-10-CM

## 2019-07-29 DIAGNOSIS — N52.8 OTHER MALE ERECTILE DYSFUNCTION: ICD-10-CM

## 2019-07-29 DIAGNOSIS — F41.9 ANXIETY: ICD-10-CM

## 2019-07-29 DIAGNOSIS — E78.2 HYPERLIPIDEMIA, MIXED: ICD-10-CM

## 2019-07-29 DIAGNOSIS — R94.4 ABNORMAL RENAL FUNCTION TEST: ICD-10-CM

## 2019-07-29 DIAGNOSIS — F33.0 MILD RECURRENT MAJOR DEPRESSION (HCC): ICD-10-CM

## 2019-07-29 DIAGNOSIS — I10 ESSENTIAL HYPERTENSION, BENIGN: Primary | ICD-10-CM

## 2019-07-29 DIAGNOSIS — M15.9 PRIMARY OSTEOARTHRITIS INVOLVING MULTIPLE JOINTS: ICD-10-CM

## 2019-07-29 DIAGNOSIS — I25.10 CORONARY ARTERY DISEASE DUE TO LIPID RICH PLAQUE: ICD-10-CM

## 2019-07-29 PROCEDURE — 3017F COLORECTAL CA SCREEN DOC REV: CPT | Performed by: FAMILY MEDICINE

## 2019-07-29 PROCEDURE — G8598 ASA/ANTIPLAT THER USED: HCPCS | Performed by: FAMILY MEDICINE

## 2019-07-29 PROCEDURE — 4040F PNEUMOC VAC/ADMIN/RCVD: CPT | Performed by: FAMILY MEDICINE

## 2019-07-29 PROCEDURE — G8427 DOCREV CUR MEDS BY ELIG CLIN: HCPCS | Performed by: FAMILY MEDICINE

## 2019-07-29 PROCEDURE — 1123F ACP DISCUSS/DSCN MKR DOCD: CPT | Performed by: FAMILY MEDICINE

## 2019-07-29 PROCEDURE — G8420 CALC BMI NORM PARAMETERS: HCPCS | Performed by: FAMILY MEDICINE

## 2019-07-29 PROCEDURE — 1036F TOBACCO NON-USER: CPT | Performed by: FAMILY MEDICINE

## 2019-07-29 PROCEDURE — 99214 OFFICE O/P EST MOD 30 MIN: CPT | Performed by: FAMILY MEDICINE

## 2019-07-29 RX ORDER — CELECOXIB 200 MG/1
200 CAPSULE ORAL DAILY
Qty: 30 CAPSULE | Refills: 0 | Status: SHIPPED | OUTPATIENT
Start: 2019-07-29 | End: 2020-06-18

## 2019-07-29 ASSESSMENT — ENCOUNTER SYMPTOMS
CHOKING: 0
STRIDOR: 0
VOMITING: 0
NAUSEA: 0
WHEEZING: 0
BLOOD IN STOOL: 0
CONSTIPATION: 0
SHORTNESS OF BREATH: 0
ANAL BLEEDING: 0
DIARRHEA: 0
ABDOMINAL DISTENTION: 0
RECTAL PAIN: 0
CHEST TIGHTNESS: 0
ABDOMINAL PAIN: 0
COUGH: 0
APNEA: 0

## 2019-07-29 NOTE — PROGRESS NOTES
other improving factors. Denies abdo pain/n-v/diarrhea/melena-blood in stool. Depression with anxiety f/u;feels well. Takes med w/o side effects. No new associated or worsening factors. Denies SI/HI/new sleep problem/hallucinations/anxiety/nervousness/appetite changes/illicit drugs/etoh abuse. CAD:managed by cards. Doing well. No new associated concerns. Allergies   Allergen Reactions    Levofloxacin Swelling     lips swell         Current Outpatient Medications:     imipramine (TOFRANIL) 25 MG tablet, TAKE 1 TABLET NIGHTLY, Disp: 90 tablet, Rfl: 0    celecoxib (CELEBREX) 200 MG capsule, Take 200 mg by mouth daily, Disp: , Rfl:     tadalafil (CIALIS) 10 MG tablet, Take 10 mg by mouth as needed for Erectile Dysfunction, Disp: , Rfl:     simvastatin (ZOCOR) 10 MG tablet, Take 1 tablet by mouth nightly, Disp: 90 tablet, Rfl: 3    rivastigmine (EXELON) 4.5 MG capsule, Take 4.5 mg by mouth 2 times daily, Disp: , Rfl:     diclofenac sodium 1 % GEL, Apply 2 g topically 2 times daily, Disp: , Rfl:     traMADol (ULTRAM) 50 MG tablet, Take 1 tablet by mouth every 8 hours as needed for Pain May cause drowsiness. May impair ability to operate vehicles or machinery.  Do not use in combination with alcohol, Disp: 15 tablet, Rfl: 0    Amantadine (SYMMETREL) 100 MG TABS tablet, Take 100 mg by mouth 2 times daily, Disp: , Rfl:     linaclotide (LINZESS) 290 MCG CAPS capsule, Take 290 mcg by mouth every morning (before breakfast), Disp: , Rfl:     clonazePAM (KLONOPIN) 0.5 MG tablet, Take 1 mg by mouth nightly , Disp: , Rfl:     carbidopa-levodopa (SINEMET)  MG per tablet, Take by mouth Take 3 tablets at 0800, 1300 1700 and one tablet at 2300, Disp: , Rfl:     Cholecalciferol (VITAMIN D3) 2000 UNITS CAPS,  Take 1 capsule by mouth daily , Disp: , Rfl:     aspirin 81 MG EC tablet,  Take 81 mg by mouth three times a week On Mondays, Wednesdays and Fridays, Disp: , Rfl:     Polyethylene Glycol 3350 specialist to avoid NSAIDs long term:pt' states he will think about it & states is ok to taking celebrex only 1-4xmonth. Recheck renal test in 3-4weeks. Possible med side effects reviewed. Pt' wishes to continue to proceed w/med. celecoxib (CELEBREX) 200 MG capsule    RENAL FUNCTION PANEL   4. Parkinson disease ([address]  Stable. Per specialist.   5. Erectile dysfunction  Stable. 6. Prediabetes  Stable. 7. Hyperlipidemia, mixed  Stable. 8. Anxiety  Stable. 9. Abnormal renal function test  Resolved. 10. CAD  Per cards           Plan:     Advised to go to local ER or call 911 for any worrisome signs/sx. Obtain labs/diagnostic tests as discussed today & call back for results within 2-7days. Pt' left office in good condition.

## 2019-09-04 DIAGNOSIS — R52 PAIN: Primary | ICD-10-CM

## 2019-09-09 ENCOUNTER — OFFICE VISIT (OUTPATIENT)
Dept: FAMILY MEDICINE CLINIC | Age: 74
End: 2019-09-09
Payer: MEDICARE

## 2019-09-09 VITALS
WEIGHT: 162.9 LBS | BODY MASS INDEX: 24.69 KG/M2 | TEMPERATURE: 98 F | OXYGEN SATURATION: 98 % | HEART RATE: 81 BPM | RESPIRATION RATE: 16 BRPM | SYSTOLIC BLOOD PRESSURE: 123 MMHG | HEIGHT: 68 IN | DIASTOLIC BLOOD PRESSURE: 74 MMHG

## 2019-09-09 DIAGNOSIS — R73.03 PREDIABETES: ICD-10-CM

## 2019-09-09 DIAGNOSIS — F41.9 ANXIETY: ICD-10-CM

## 2019-09-09 DIAGNOSIS — I25.83 CORONARY ARTERY DISEASE DUE TO LIPID RICH PLAQUE: ICD-10-CM

## 2019-09-09 DIAGNOSIS — E78.2 HYPERLIPIDEMIA, MIXED: ICD-10-CM

## 2019-09-09 DIAGNOSIS — N52.8 OTHER MALE ERECTILE DYSFUNCTION: ICD-10-CM

## 2019-09-09 DIAGNOSIS — R94.4 KIDNEY FUNCTION TEST ABNORMAL: ICD-10-CM

## 2019-09-09 DIAGNOSIS — I25.10 CORONARY ARTERY DISEASE DUE TO LIPID RICH PLAQUE: ICD-10-CM

## 2019-09-09 DIAGNOSIS — F33.0 MILD RECURRENT MAJOR DEPRESSION (HCC): ICD-10-CM

## 2019-09-09 DIAGNOSIS — M15.9 PRIMARY OSTEOARTHRITIS INVOLVING MULTIPLE JOINTS: ICD-10-CM

## 2019-09-09 DIAGNOSIS — K21.9 GASTROESOPHAGEAL REFLUX DISEASE WITHOUT ESOPHAGITIS: ICD-10-CM

## 2019-09-09 DIAGNOSIS — G20 PARKINSON DISEASE (HCC): ICD-10-CM

## 2019-09-09 DIAGNOSIS — I10 ESSENTIAL HYPERTENSION, BENIGN: Primary | ICD-10-CM

## 2019-09-09 PROCEDURE — 1036F TOBACCO NON-USER: CPT | Performed by: FAMILY MEDICINE

## 2019-09-09 PROCEDURE — 99214 OFFICE O/P EST MOD 30 MIN: CPT | Performed by: FAMILY MEDICINE

## 2019-09-09 PROCEDURE — 3017F COLORECTAL CA SCREEN DOC REV: CPT | Performed by: FAMILY MEDICINE

## 2019-09-09 PROCEDURE — G8598 ASA/ANTIPLAT THER USED: HCPCS | Performed by: FAMILY MEDICINE

## 2019-09-09 PROCEDURE — G8420 CALC BMI NORM PARAMETERS: HCPCS | Performed by: FAMILY MEDICINE

## 2019-09-09 PROCEDURE — 4040F PNEUMOC VAC/ADMIN/RCVD: CPT | Performed by: FAMILY MEDICINE

## 2019-09-09 PROCEDURE — 1123F ACP DISCUSS/DSCN MKR DOCD: CPT | Performed by: FAMILY MEDICINE

## 2019-09-09 PROCEDURE — G8427 DOCREV CUR MEDS BY ELIG CLIN: HCPCS | Performed by: FAMILY MEDICINE

## 2019-09-09 ASSESSMENT — ENCOUNTER SYMPTOMS
WHEEZING: 0
STRIDOR: 0
APNEA: 0
ABDOMINAL PAIN: 0
NAUSEA: 0
ANAL BLEEDING: 0
CHEST TIGHTNESS: 0
ABDOMINAL DISTENTION: 0
CONSTIPATION: 0
CHOKING: 0
BLOOD IN STOOL: 0
RECTAL PAIN: 0
SHORTNESS OF BREATH: 0
DIARRHEA: 0
BACK PAIN: 1
VOMITING: 0
COUGH: 0

## 2019-09-09 ASSESSMENT — PATIENT HEALTH QUESTIONNAIRE - PHQ9
SUM OF ALL RESPONSES TO PHQ QUESTIONS 1-9: 0
1. LITTLE INTEREST OR PLEASURE IN DOING THINGS: 0
SUM OF ALL RESPONSES TO PHQ9 QUESTIONS 1 & 2: 0
2. FEELING DOWN, DEPRESSED OR HOPELESS: 0
SUM OF ALL RESPONSES TO PHQ QUESTIONS 1-9: 0

## 2019-09-09 NOTE — PROGRESS NOTES
sounds are normal. He exhibits no distension and no mass. There is no splenomegaly or hepatomegaly. There is no tenderness. There is no rebound and no guarding. Musculoskeletal:        Right hip: Normal.        Left hip: Normal.        Cervical back: Normal.        Thoracic back: Normal.        Lumbar back: Normal.   Lymphadenopathy:     He has no cervical adenopathy. No supraclavicular LAD. Neurological: He is alert and oriented to person, place, and time. He has normal reflexes. Skin: Skin is warm, dry and intact. Good skin turgor. Capillary refill=2-3 secs. Psychiatric: He has a normal mood and affect. Assessment:       Diagnosis Orders   1. Essential hypertension, benign  VSS/well appearing. Stable. 2. Kidney function test abnormal  Recheck lab:avoid NSAIDs. Has stopped celebrex. Takes otc tylenol prn for knees(max daily=3g). RENAL FUNCTION PANEL  Nephro consult if not better. 3. Mild recurrent major depression (HCC)  Stable. 4. Gastroesophageal reflux disease without esophagitis  Stable. 5. Parkinson disease ([address]  Stable. Per neurology. 6. Erectile dysfunction  Stable. 7. Prediabetes  Stable. Labs due 11/2019.     8. Anxiety  Stable. 9. Hyperlipidemia, mixed  Stable. Labs due 11/2019. 10. CAD  Stable. Per cards. 11. Osteoarthritis involving multiple joints  Stable. See note#2. Plan:      Pt' left office in good condition. .  Obtain labs/diagnostic tests as discussed today & call back for results within 2-7days. Call or return to clinic prn if these symptoms worsen or fail to improve as anticipated.           Anahy Mullen MD

## 2019-09-30 LAB
ALBUMIN SERPL-MCNC: 4.3 G/DL (ref 3.5–5.7)
ANION GAP SERPL CALCULATED.3IONS-SCNC: 7 MMOL/L (ref 3–16)
BUN BLDV-MCNC: 24 MG/DL (ref 7–25)
CALCIUM SERPL-MCNC: 9.6 MG/DL (ref 8.6–10.3)
CHLORIDE BLD-SCNC: 103 MMOL/L (ref 98–110)
CO2: 30 MMOL/L (ref 21–33)
CREAT SERPL-MCNC: 1.15 MG/DL (ref 0.6–1.3)
GFR, ESTIMATED: 62 SEE NOTE.
GFR, ESTIMATED: 72 SEE NOTE.
GLUCOSE BLD-MCNC: 107 MG/DL (ref 70–100)
OSMOLALITY CALCULATION: 295 MOSM/KG (ref 278–305)
PHOSPHORUS: 3.1 MG/DL (ref 2.1–4.5)
POTASSIUM SERPL-SCNC: 4.3 MMOL/L (ref 3.5–5.3)
SODIUM BLD-SCNC: 140 MMOL/L (ref 133–146)

## 2019-11-04 ENCOUNTER — OFFICE VISIT (OUTPATIENT)
Dept: CARDIOLOGY CLINIC | Age: 74
End: 2019-11-04
Payer: MEDICARE

## 2019-11-04 VITALS
SYSTOLIC BLOOD PRESSURE: 128 MMHG | BODY MASS INDEX: 25.24 KG/M2 | HEART RATE: 72 BPM | DIASTOLIC BLOOD PRESSURE: 80 MMHG | WEIGHT: 166 LBS

## 2019-11-04 DIAGNOSIS — I20.9 ANGINA PECTORIS (HCC): ICD-10-CM

## 2019-11-04 DIAGNOSIS — I25.10 CAD IN NATIVE ARTERY: Primary | ICD-10-CM

## 2019-11-04 DIAGNOSIS — I10 ESSENTIAL HYPERTENSION: ICD-10-CM

## 2019-11-04 PROCEDURE — G8417 CALC BMI ABV UP PARAM F/U: HCPCS | Performed by: INTERNAL MEDICINE

## 2019-11-04 PROCEDURE — 1123F ACP DISCUSS/DSCN MKR DOCD: CPT | Performed by: INTERNAL MEDICINE

## 2019-11-04 PROCEDURE — G8484 FLU IMMUNIZE NO ADMIN: HCPCS | Performed by: INTERNAL MEDICINE

## 2019-11-04 PROCEDURE — 4040F PNEUMOC VAC/ADMIN/RCVD: CPT | Performed by: INTERNAL MEDICINE

## 2019-11-04 PROCEDURE — 3017F COLORECTAL CA SCREEN DOC REV: CPT | Performed by: INTERNAL MEDICINE

## 2019-11-04 PROCEDURE — G8598 ASA/ANTIPLAT THER USED: HCPCS | Performed by: INTERNAL MEDICINE

## 2019-11-04 PROCEDURE — 99214 OFFICE O/P EST MOD 30 MIN: CPT | Performed by: INTERNAL MEDICINE

## 2019-11-04 PROCEDURE — 1036F TOBACCO NON-USER: CPT | Performed by: INTERNAL MEDICINE

## 2019-11-04 PROCEDURE — G8427 DOCREV CUR MEDS BY ELIG CLIN: HCPCS | Performed by: INTERNAL MEDICINE

## 2019-11-05 ENCOUNTER — HOSPITAL ENCOUNTER (OUTPATIENT)
Dept: INTERVENTIONAL RADIOLOGY/VASCULAR | Age: 74
Discharge: HOME | End: 2019-11-05
Payer: MEDICARE

## 2019-11-05 DIAGNOSIS — M25.362 OTHER INSTABILITY, LEFT KNEE: ICD-10-CM

## 2019-11-05 PROCEDURE — 76000 FLUOROSCOPY <1 HR PHYS/QHP: CPT

## 2019-12-04 ENCOUNTER — TELEPHONE (OUTPATIENT)
Dept: ORTHOPEDIC SURGERY | Age: 74
End: 2019-12-04

## 2020-02-07 RX ORDER — SIMVASTATIN 10 MG
TABLET ORAL
Qty: 90 TABLET | Refills: 4 | Status: SHIPPED | OUTPATIENT
Start: 2020-02-07 | End: 2021-03-16 | Stop reason: SDUPTHER

## 2020-06-05 ENCOUNTER — OFFICE VISIT (OUTPATIENT)
Dept: CARDIOLOGY CLINIC | Age: 75
End: 2020-06-05
Payer: MEDICARE

## 2020-06-05 VITALS
DIASTOLIC BLOOD PRESSURE: 80 MMHG | BODY MASS INDEX: 24.78 KG/M2 | WEIGHT: 163 LBS | HEART RATE: 60 BPM | SYSTOLIC BLOOD PRESSURE: 120 MMHG | TEMPERATURE: 98 F

## 2020-06-05 PROCEDURE — 1036F TOBACCO NON-USER: CPT | Performed by: INTERNAL MEDICINE

## 2020-06-05 PROCEDURE — G8420 CALC BMI NORM PARAMETERS: HCPCS | Performed by: INTERNAL MEDICINE

## 2020-06-05 PROCEDURE — 3017F COLORECTAL CA SCREEN DOC REV: CPT | Performed by: INTERNAL MEDICINE

## 2020-06-05 PROCEDURE — G8428 CUR MEDS NOT DOCUMENT: HCPCS | Performed by: INTERNAL MEDICINE

## 2020-06-05 PROCEDURE — 99214 OFFICE O/P EST MOD 30 MIN: CPT | Performed by: INTERNAL MEDICINE

## 2020-06-05 PROCEDURE — 4040F PNEUMOC VAC/ADMIN/RCVD: CPT | Performed by: INTERNAL MEDICINE

## 2020-06-05 PROCEDURE — 1123F ACP DISCUSS/DSCN MKR DOCD: CPT | Performed by: INTERNAL MEDICINE

## 2020-06-05 NOTE — PROGRESS NOTES
Subjective:      Patient ID: Ambar Iraheta is a 76 y.o. male. HPI Alexander Martinez is being seen for a 6 month follow up CAD/angina/HTN. Reasonably active. Walking. Bothered by [employer]. Fatigue. No chest pain. No tachycardia. No syncope. No pnd or orthopnea. No edema. Wt stable. BP good . Feeling good. Past Medical History:   Diagnosis Date    Anxiety     PCP in [place]:Dr. Diamond Perdomo.    Arthritis     Arthritis of hand, right 6/20/2012    Sneed esophagus     Dr. Durga Hampton. EGD:1/17/2012. Next EGD due in 3yrs=1/2015    Sneed esophagus     Under care of GI    Bilateral carotid artery stenosis <50% 4/30/2014    BPH (benign prostatic hypertrophy)     Dr. Betsey Patel. PSA per urology.     CAD (coronary artery disease)     under care of cards:Dr. Angelika Marie    Carotid stenosis     Chest pain     pt states he no chest pain in 5 yrs, panic attack    Chronic back pain 1/26/2012    Chronic back pain     under care of ortho spine:Dr. Sweeney    Constipation 8/6/2013    Degenerative arthritis of thoracic spine 1/26/2012    Degenerative cervical disc 1/26/2012    Depression with anxiety 11/15/2011    Klonopin per neurologist(Dr. Cortney Denton)    Diverticulosis 1/17/2012    colonoscopy    Elevated PSA     8/21/14;5/2013:under care of Dr. Beltran/[place]:[facility]rology group    Erectile dysfunction 6/13/2017    Essential hypertension, benign 6/20/2012    cardiologist:Dr. Rick Gaviria    GERD (gastroesophageal reflux disease)     Hemorrhoid     Hiatal hernia     small    Hyperlipidemia     Impaired fasting glucose 5/19/2014    Osteopenia per CXR 5/19/2014    Parkinson disease ([facility]     Dr. Vijay Durno Neurology    Renal stone 4/2012    4 mm distal left ureteral calculus:Dr. Beltran:urologist    RLS (restless legs syndrome)     S/P colonoscopy 2011;5/20/19    Dr. Rosalie Blandon per pt' next is in 10yrs-2021. 5/20/19(Dr. Ariela Mcintyre)    S/P endoscopy 1/2012    Dr. Becky Sevilla acute changes:+Barrets:next in 3yrs 1/2015

## 2020-06-16 ENCOUNTER — TELEPHONE (OUTPATIENT)
Dept: FAMILY MEDICINE CLINIC | Age: 75
End: 2020-06-16

## 2020-06-16 NOTE — TELEPHONE ENCOUNTER
Patient would like to schedule an appt for a check up. Patient is availabe to come in the office in a few weeks.     ov: 9/9/2019   cb: [phone]  Caller: wife    Please advise

## 2020-06-18 ENCOUNTER — VIRTUAL VISIT (OUTPATIENT)
Dept: FAMILY MEDICINE CLINIC | Age: 75
End: 2020-06-18
Payer: MEDICARE

## 2020-06-18 PROCEDURE — 1123F ACP DISCUSS/DSCN MKR DOCD: CPT | Performed by: FAMILY MEDICINE

## 2020-06-18 PROCEDURE — 4040F PNEUMOC VAC/ADMIN/RCVD: CPT | Performed by: FAMILY MEDICINE

## 2020-06-18 PROCEDURE — 3017F COLORECTAL CA SCREEN DOC REV: CPT | Performed by: FAMILY MEDICINE

## 2020-06-18 PROCEDURE — G8427 DOCREV CUR MEDS BY ELIG CLIN: HCPCS | Performed by: FAMILY MEDICINE

## 2020-06-18 PROCEDURE — 99214 OFFICE O/P EST MOD 30 MIN: CPT | Performed by: FAMILY MEDICINE

## 2020-06-18 ASSESSMENT — ENCOUNTER SYMPTOMS
CHEST TIGHTNESS: 0
ABDOMINAL PAIN: 0
NAUSEA: 0
VOMITING: 0
RECTAL PAIN: 0
WHEEZING: 0
COUGH: 0
BLOOD IN STOOL: 0
SHORTNESS OF BREATH: 0
ANAL BLEEDING: 0
CONSTIPATION: 0
APNEA: 0
CHOKING: 0
DIARRHEA: 0
ABDOMINAL DISTENTION: 0
STRIDOR: 0

## 2020-06-18 NOTE — PROGRESS NOTES
Subjective:      Patient ID: Ever Gonzales is a 76 y.o. male. HPI  Patient is  being evaluated by a Virtual Visit (video visit) encounter to address concerns as mentioned above. A caregiver was present when appropriate. Due to this being a TeleHealth encounter (During [facility] public health emergency), evaluation of the following organ systems was limited: Vitals/Constitutional/EENT/Resp/CV/GI//MS/Neuro/Skin/Heme-Lymph-Imm. Pursuant to the emergency declaration under the [address] [place] [address] authority and the [employer] Act, this Virtual Visit was conducted with patient's (and/or legal guardian's) consent, to reduce the patient's risk of exposure to COVID-19 and provide necessary medical care. The patient (and/or legal guardian) has also been advised to contact this office for worsening conditions or problems, and seek emergency medical treatment and/or call 911 if deemed necessary. Services were provided through a video synchronous discussion virtually to substitute for in-person clinic visit. Patient and provider were located at their individual homes. \"THIS VISIT WAS COMPLETED VIRTUALLY USING DOXY. ME\"           GERD check:mild:is doing well. No other associated/worsening or other improving factors. Denies abdo pain/n-v/diarrhea/melena-blood in stool. HTN f/u: doing well  Associated w/nothing new. Worsened by nothing new. Improves w/current med. Adds salt to food at the table:No does not. Denies cp/sob/pnd/ankle edema/dizziness. Results for Bria Hamilton (MRN <[id number]>) as of 6/18/2020 11:06   Ref.  Range 6/17/2019 12:55   Sodium Latest Ref Range: 134 - 144 mmol/L 143   Potassium Latest Ref Range: 3.5 - 5.2 mmol/L 3.8   Chloride Latest Ref Range: 96 - 106 mmol/L 102   CO2 Latest Ref Range: 20 - 29 mmol/L 26   BUN Latest Ref Range: 8 - 27 mg/dL 20   Creatinine Latest Ref Range: 0.76 - 1.27 mg/dL 1.13   BUN/Creatinine Ratio Latest Ref Range: 10 - 24  18   GFR Non- Latest Ref Range: >59 mL/min/1.73 64   GFR  Latest Ref Range: >59 mL/min/1.73 74   Glucose Latest Ref Range: 65 - 99 mg/dL 91   Calcium Latest Ref Range: 8.6 - 10.2 mg/dL 9.5   Phosphorus Latest Ref Range: 2.5 - 4.5 mg/dL 3.0     Abnml GFR/cr:see above f/u results   Associated w/nothing new. Has stopped celebrex over the past 2weeks. Denies urinary complaints/abdo pain/ahxqtbg-vjzjojmzk-tnzfwyz/decreased urinary flow/sensation of incomplete voiding/excessive NSAIDs use. Parkinson's:per neurology:doing well per baseline. No other new associated concerns. Erectile Dysfunction:mild-moderate:doing well per baseline. No other new associated concerns. Denies cp/sob/dizziness/penile pain-discharge/bleeding or lesions. Hyperlipidemia:doing well. Labs due:last labs were 5/16/19. Follows good diet regime. No new associated concerns. Depression with anxiety:per pt' doing well    Taking med w/o side effects. No new associated or worsening factors. Denies SI/HI/new sleep problem/hallucinations/anxiety/nervousness/appetite changes/illicit drugs/etoh abuse. CAD f/u:under care of cards. Doing well. No new associated concerns.     Allergies   Allergen Reactions    Levofloxacin Swelling     lips swell       Current Outpatient Medications on File Prior to Visit   Medication Sig Dispense Refill    imipramine (TOFRANIL) 25 MG tablet TAKE 1 TABLET NIGHTLY 90 tablet 0    simvastatin (ZOCOR) 10 MG tablet TAKE 1 TABLET NIGHTLY 90 tablet 4    celecoxib (CELEBREX) 200 MG capsule Take 1 capsule by mouth daily (Patient not taking: Reported on 11/4/2019) 30 capsule 0    tadalafil (CIALIS) 10 MG tablet Take 10 mg by mouth as needed for Erectile Dysfunction      rivastigmine (EXELON) 4.5 MG capsule Take 4.5 mg by mouth 2 times daily      diclofenac sodium 1 % GEL Apply 2 g topically 2 times daily diarrhea, nausea, rectal pain and vomiting. Endocrine: Negative for cold intolerance, heat intolerance, polydipsia, polyphagia and polyuria. Genitourinary: Negative for difficulty urinating. Skin: Negative for pallor, rash and wound. Neurological: Negative for dizziness and light-headedness. Hematological: Negative for adenopathy. Psychiatric/Behavioral: Negative for sleep disturbance. Objective:RR=16   Physical Exam  Constitutional:       Appearance: He is well-developed. He is not toxic-appearing. Comments: Note:exam was conducted with pt' either self-palpating or visually indicating via their device camera. HENT:      Head:      Comments: Nml external ear & nose exams. Mouth/Throat:      Mouth: Mucous membranes are moist.      Pharynx: Oropharynx is clear. Uvula midline. Eyes:      General: No scleral icterus. Conjunctiva/sclera: Conjunctivae normal.   Neck:      Comments: No neck LAD per self-palpation. Pulmonary:      Effort: Pulmonary effort is normal.      Comments: No audible wheezing/cough/sob. Skin:     Coloration: Skin is not cyanotic. Findings: No rash. Neurological:      Mental Status: He is alert. Psychiatric:         Attention and Perception: Attention and perception normal. He is attentive. He does not perceive auditory or visual hallucinations. Mood and Affect: Mood and affect normal. Mood is not anxious, depressed or elated. Affect is not labile, blunt, flat, angry, tearful or inappropriate. Speech: Speech normal. He is communicative. Speech is not rapid and pressured, delayed, slurred or tangential.         Behavior: Behavior normal. Behavior is not agitated, slowed, aggressive, withdrawn, hyperactive or combative. Behavior is cooperative. Thought Content: Thought content normal. Thought content is not paranoid or delusional. Thought content does not include homicidal or suicidal ideation.          Cognition and Memory:

## 2020-06-22 ENCOUNTER — TELEPHONE (OUTPATIENT)
Dept: FAMILY MEDICINE CLINIC | Age: 75
End: 2020-06-22

## 2020-07-01 DIAGNOSIS — R73.03 PREDIABETES: ICD-10-CM

## 2020-07-01 DIAGNOSIS — I10 ESSENTIAL HYPERTENSION, BENIGN: ICD-10-CM

## 2020-07-01 DIAGNOSIS — E78.2 HYPERLIPIDEMIA, MIXED: ICD-10-CM

## 2020-07-01 PROCEDURE — 36415 COLL VENOUS BLD VENIPUNCTURE: CPT | Performed by: FAMILY MEDICINE

## 2020-07-02 LAB
A/G RATIO: 2 (ref 1.1–2.2)
ALBUMIN SERPL-MCNC: 4.6 G/DL (ref 3.4–5)
ALP BLD-CCNC: 87 U/L (ref 40–129)
ALT SERPL-CCNC: <5 U/L (ref 10–40)
ANION GAP SERPL CALCULATED.3IONS-SCNC: 12 MMOL/L (ref 3–16)
AST SERPL-CCNC: 15 U/L (ref 15–37)
BILIRUB SERPL-MCNC: 0.7 MG/DL (ref 0–1)
BUN BLDV-MCNC: 21 MG/DL (ref 7–20)
CALCIUM SERPL-MCNC: 9.4 MG/DL (ref 8.3–10.6)
CHLORIDE BLD-SCNC: 101 MMOL/L (ref 99–110)
CHOLESTEROL, TOTAL: 120 MG/DL (ref 0–199)
CO2: 27 MMOL/L (ref 21–32)
CREAT SERPL-MCNC: 1.3 MG/DL (ref 0.8–1.3)
ESTIMATED AVERAGE GLUCOSE: 116.9 MG/DL
GFR AFRICAN AMERICAN: >60
GFR NON-AFRICAN AMERICAN: 54
GLOBULIN: 2.3 G/DL
GLUCOSE BLD-MCNC: 102 MG/DL (ref 70–99)
HBA1C MFR BLD: 5.7 %
HDLC SERPL-MCNC: 58 MG/DL (ref 40–60)
LDL CHOLESTEROL CALCULATED: 53 MG/DL
POTASSIUM SERPL-SCNC: 4.7 MMOL/L (ref 3.5–5.1)
SODIUM BLD-SCNC: 140 MMOL/L (ref 136–145)
TOTAL PROTEIN: 6.9 G/DL (ref 6.4–8.2)
TRIGL SERPL-MCNC: 46 MG/DL (ref 0–150)
VLDLC SERPL CALC-MCNC: 9 MG/DL

## 2020-07-08 ENCOUNTER — TELEPHONE (OUTPATIENT)
Dept: FAMILY MEDICINE CLINIC | Age: 75
End: 2020-07-08

## 2020-07-08 NOTE — TELEPHONE ENCOUNTER
[phone] (H)  OV: 6/18/2020    Patient would like to schedule an appt for a F/U on blood work. Please advise.

## 2020-07-23 ENCOUNTER — APPOINTMENT (OUTPATIENT)
Dept: CT IMAGING | Age: 75
End: 2020-07-23
Payer: MEDICARE

## 2020-07-23 ENCOUNTER — HOSPITAL ENCOUNTER (EMERGENCY)
Age: 75
Discharge: HOME | End: 2020-07-23
Attending: EMERGENCY MEDICINE
Payer: MEDICARE

## 2020-07-23 VITALS
RESPIRATION RATE: 17 BRPM | SYSTOLIC BLOOD PRESSURE: 148 MMHG | HEART RATE: 91 BPM | OXYGEN SATURATION: 99 % | DIASTOLIC BLOOD PRESSURE: 72 MMHG | TEMPERATURE: 97.9 F

## 2020-07-23 PROCEDURE — 70450 CT HEAD/BRAIN W/O DYE: CPT

## 2020-07-23 PROCEDURE — 6370000000 HC RX 637 (ALT 250 FOR IP): Performed by: NURSE PRACTITIONER

## 2020-07-23 PROCEDURE — 12011 RPR F/E/E/N/L/M 2.5 CM/<: CPT

## 2020-07-23 PROCEDURE — 70486 CT MAXILLOFACIAL W/O DYE: CPT

## 2020-07-23 PROCEDURE — 99284 EMERGENCY DEPT VISIT MOD MDM: CPT

## 2020-07-23 PROCEDURE — 72125 CT NECK SPINE W/O DYE: CPT

## 2020-07-23 RX ADMIN — Medication: at 19:08

## 2020-07-23 ASSESSMENT — PAIN SCALES - GENERAL: PAINLEVEL_OUTOF10: 3

## 2020-07-23 NOTE — ED PROVIDER NOTES
[facility] [place]  EMERGENCY DEPARTMENT ENCOUNTER          NURSE PRACTITIONER NOTE     Date of evaluation: 7/23/2020    Chief Complaint     Fall and Ear Laceration      History of Present Illness      Agutsín Reaves is a 76 y.o. male with a history of Parkinson's disease, frequent falls, presents to the emergency department after mechanical fall that occurred while he was in his garage. He was trying to change the battery out of his mower and stumbled and fell onto his left side, striking the left side of his head and sustaining a left ear laceration. Also sustained a small laceration to his left forearm. His tetanus is up-to-date. He did not lose consciousness. He is not on blood thinners. He denies any headache, dizziness, lightheadedness, nausea, vomiting, neck pain, back pain, chest pain, abdominal pain, shortness of breath, or pain in his extremities. He is ambulated since the incident. With the exception of the above, there are no aggravating or alleviating factors. Review of Systems     Pertinent positive and negative findings as documented above in HPI, otherwise all other systems were reviewed and negative     Past Medical, Surgical, Family, and Social History     Medical History:   Past Medical History:   Diagnosis Date    Anxiety     PCP in [place]:Dr. Arnulfo Haney.    Arthritis     Arthritis of hand, right 6/20/2012    Sneed esophagus     Dr. Og Cosby. EGD:1/17/2012. Next EGD due in 3yrs=1/2015    Sneed esophagus     Under care of GI    Bilateral carotid artery stenosis <50% 4/30/2014    BPH (benign prostatic hypertrophy)     Dr. Riana Panda. PSA per urology.     CAD (coronary artery disease)     under care of cards:Dr. Ky Dennison    Carotid stenosis     Chest pain     pt states he no chest pain in 5 yrs, panic attack    Chronic back pain 1/26/2012    Chronic back pain     under care of ortho spine:Dr. Sweeney    Constipation 8/6/2013    Degenerative arthritis of thoracic spine 1/26/2012    Degenerative cervical disc 1/26/2012    Depression with anxiety 11/15/2011    Klonopin per neurologist(Dr. Geo Armstrong)    Diverticulosis 1/17/2012    colonoscopy    Elevated PSA     8/21/14;5/2013:under care of Dr. Beltran/[place]:Urology group    Erectile dysfunction 6/13/2017    Essential hypertension, benign 6/20/2012    cardiologist:Dr. Anahy Pitts    GERD (gastroesophageal reflux disease)     Hemorrhoid     Hiatal hernia     small    Hyperlipidemia     Impaired fasting glucose 5/19/2014    Osteopenia per CXR 5/19/2014    Parkinson disease ([facility] [address]     Dr. Heraclio Sinclair Neurology    Renal stone 4/2012    4 mm distal left ureteral calculus:Dr. Beltran:urologist    RLS (restless legs syndrome)     S/P colonoscopy 2011;5/20/19    Dr. Bertha Muse per pt' next is in 10yrs-2021. 5/20/19(Dr. Anup Douglas)    S/P endoscopy 1/2012    Dr. Indira De Jesus acute changes:+Barrets:next in 3yrs 1/2015    Sigmoidoscopy exam 1/17/2012    Dr. Lama:No acute changes. Next in 5yrs=1/2017    Therapeutic drug monitoring 5/14/15    Consistent OARRS report on 5/14/15;8/4/15    Thoracic spondylosis     under care of ortho spine:Dr. Stanislav Lopez    Venous insufficiency of leg     Vocal cord paresis 5/11/2016    under care of ENT:Dr. Natali Mantilla. s/p vocal cord augmentation 6/2016 at [facility]       Surgical History:   Past Surgical History:   Procedure Laterality Date    CATARACT REMOVAL      COLONOSCOPY  2002;2011    JOINT REPLACEMENT Right 02/23/2017    Right TKR(knee)    KNEE ARTHROPLASTY Left 7/30/13    LEFT TOTAL KNEE ARTHROPLASTY              KNEE CARTILAGE SURGERY      Left x 2, right x1    LITHOTRIPSY      Kidney stone removal as per urology:Stent removed after 10days    OSTEOTOMY Left     TURP N/A [id number]    TRANSURETHRAL RESECTION OF PROSTATE    UPPER GASTROINTESTINAL ENDOSCOPY  5/07       Social History: He reports that he has never smoked.  He has never used smokeless tobacco. He reports current alcohol use. He reports that he does not use drugs. Family History: His family history includes Alcohol Abuse in his father; Cancer (age of onset: 68) in his sister; Coronary Art Dis (age of onset: 77) in his brother; Coronary Art Dis (age of onset: 80) in his mother; Heart Disease in his sister; Kidney Disease (age of onset: 61) in his father. Medications     Discharge Medication List as of 7/23/2020  8:08 PM      CONTINUE these medications which have NOT CHANGED    Details   imipramine (TOFRANIL) 25 MG tablet TAKE 1 TABLET NIGHTLY, Disp-90 tablet,R-0Normal      simvastatin (ZOCOR) 10 MG tablet TAKE 1 TABLET NIGHTLY, Disp-90 tablet, R-4Normal      tadalafil (CIALIS) 10 MG tablet Take 10 mg by mouth as needed for Erectile DysfunctionHistorical Med      diclofenac sodium 1 % GEL Apply 2 g topically 2 times daily, Topical, 2 TIMES DAILY, Historical Med      Amantadine (SYMMETREL) 100 MG TABS tablet Take 100 mg by mouth 2 times daily      linaclotide (LINZESS) 290 MCG CAPS capsule Take 290 mcg by mouth every morning (before breakfast)      clonazePAM (KLONOPIN) 0.5 MG tablet Take 1 mg by mouth nightly Historical Med      carbidopa-levodopa (SINEMET)  MG per tablet Take by mouth Take 3 tablets at 0800, 1300 1700 and one tablet at 2300      Cholecalciferol (VITAMIN D3) 2000 UNITS CAPS   Take 1 capsule by mouth daily       aspirin 81 MG EC tablet   Take 81 mg by mouth three times a week On Mondays, Wednesdays and Fridays      Polyethylene Glycol 3350 (MIRALAX PO)   Take 17 g by mouth daily as needed       pantoprazole (PROTONIX) 40 MG tablet   Take 40 mg by mouth daily              Allergies     Allergies:    Allergies as of 07/23/2020 - Review Complete 07/23/2020   Allergen Reaction Noted    Levofloxacin Swelling 12/16/2010        Physical Exam     INITIAL VITALS: BP: (!) 148/72, Temp: 97.9 °F (36.6 °C), Pulse: 91, Resp: 17, SpO2: 99 %     General: 76 y.o. male in no apparent distress, well developed, well nourished, advised on local wound care    At this point in time, patient is stable for discharge. Patient was given strict return precautions as outlined in the AVS. Patient was agreeable and understanding to this plan of care. Prior to discharge, patient was ambulatory and PO tolerant. The patient was evaluated by myself and the ED Attending Physician, Dr. Layton Hall All management and disposition plans were discussed and agreed upon. Clinical Impression     1.  Laceration of left earlobe, initial encounter        Disposition     DC    DISCHARGE MEDICATIONS:  Discharge Medication List as of 7/23/2020  8:08 PM          PATIENT REFERRED TO:  [facility] Emergency Department  [address]  [phone]    As needed, If symptoms worsen      Tram Fields CNP  Department of Emergency Medicine     [facility]  07/23/20 2012

## 2020-07-23 NOTE — ED NOTES
Mechanical fall this afternoon while mowing his lawn. Hx. Of Parkinson's, reports unsteady gait and frequent falls. Denies LOC, cervical tenderness or HA. Skin tear to left ear. Bleeding controlled on arrival to ED. Unsure of Tdap status.       Wilfredo Juarez RN  07/23/20 0278

## 2020-07-23 NOTE — ED PROVIDER NOTES
ED Attending Attestation Note     Date of evaluation: 7/23/2020    This patient was seen by the advance practice provider. I have seen and examined the patient, agree with the workup, evaluation, management and diagnosis. The care plan has been discussed. My assessment reveals a 70-year-old male with history of Parkinson's disease presenting with a left ear laceration and left jaw pain after mechanical fall while trying to change the battery in his electric lawnmower. The patient has a 1 cm dogeared laceration over the antihelix of the left ear with no active arterial bleeding. There is some exposed cartilage. He is also complaining of jaw pain but is phonating well and has no crepitus over the TMJ. GCS of 4, 5, 6. I was present for all elements of the laceration repair performed by the EM ALEJANDRO.     Mike Baird MD  07/23/20 2015 Darron Siddiqi MD  07/25/20 0189

## 2020-07-28 ENCOUNTER — OFFICE VISIT (OUTPATIENT)
Dept: FAMILY MEDICINE CLINIC | Age: 75
End: 2020-07-28
Payer: MEDICARE

## 2020-07-28 VITALS
BODY MASS INDEX: 24.38 KG/M2 | TEMPERATURE: 98.2 F | SYSTOLIC BLOOD PRESSURE: 109 MMHG | HEIGHT: 68 IN | RESPIRATION RATE: 16 BRPM | WEIGHT: 160.9 LBS | OXYGEN SATURATION: 99 % | HEART RATE: 59 BPM | DIASTOLIC BLOOD PRESSURE: 74 MMHG

## 2020-07-28 PROCEDURE — 4040F PNEUMOC VAC/ADMIN/RCVD: CPT | Performed by: FAMILY MEDICINE

## 2020-07-28 PROCEDURE — 99214 OFFICE O/P EST MOD 30 MIN: CPT | Performed by: FAMILY MEDICINE

## 2020-07-28 PROCEDURE — G8427 DOCREV CUR MEDS BY ELIG CLIN: HCPCS | Performed by: FAMILY MEDICINE

## 2020-07-28 PROCEDURE — 3017F COLORECTAL CA SCREEN DOC REV: CPT | Performed by: FAMILY MEDICINE

## 2020-07-28 PROCEDURE — 1123F ACP DISCUSS/DSCN MKR DOCD: CPT | Performed by: FAMILY MEDICINE

## 2020-07-28 PROCEDURE — G8420 CALC BMI NORM PARAMETERS: HCPCS | Performed by: FAMILY MEDICINE

## 2020-07-28 PROCEDURE — 1036F TOBACCO NON-USER: CPT | Performed by: FAMILY MEDICINE

## 2020-07-28 RX ORDER — DONEPEZIL HYDROCHLORIDE 5 MG/1
10 TABLET, FILM COATED ORAL NIGHTLY
COMMUNITY
Start: 2020-07-15 | End: 2022-08-25 | Stop reason: DRUGHIGH

## 2020-07-28 ASSESSMENT — ENCOUNTER SYMPTOMS
APNEA: 0
CHEST TIGHTNESS: 0
ABDOMINAL PAIN: 0
ANAL BLEEDING: 0
ABDOMINAL DISTENTION: 0
BLOOD IN STOOL: 0
VOMITING: 0
RECTAL PAIN: 0
CHOKING: 0
DIARRHEA: 0
STRIDOR: 0
COUGH: 0
BACK PAIN: 1
CONSTIPATION: 0
WHEEZING: 0
NAUSEA: 0
SHORTNESS OF BREATH: 0

## 2020-07-28 NOTE — PROGRESS NOTES
Subjective:      Patient ID: Erlin Waters is a 76 y.o. male. HPI    Results for Kerry Quezada (MRN <[id number]>) as of 7/28/2020 11:06   Ref. Range 7/1/2020 10:27   Sodium Latest Ref Range: 136 - 145 mmol/L 140   Potassium Latest Ref Range: 3.5 - 5.1 mmol/L 4.7   Chloride Latest Ref Range: 99 - 110 mmol/L 101   CO2 Latest Ref Range: 21 - 32 mmol/L 27   BUN Latest Ref Range: 7 - 25 mg/dL 21   Creatinine Latest Ref Range: 0.8 - 1.3 mg/dL 1.3   Anion Gap Latest Ref Range: 3 - 16  12   GFR Non- Latest Ref Range: >60  54 (A)   GFR  Latest Ref Range: >60  >60   Glucose Latest Ref Range: 70 - 99 mg/dL 102 (H)   Calcium Latest Ref Range: 8.3 - 10.6 mg/dL 9.4   Total Protein Latest Ref Range: 6.4 - 8.2 g/dL 6.9   Cholesterol, Total Latest Ref Range: 0 - 199 mg/dL 120   HDL Cholesterol Latest Ref Range: 40 - 60 mg/dL 58   LDL Calculated Latest Ref Range: <100 mg/dL 53   Triglycerides Latest Ref Range: 0 - 150 mg/dL 46   VLDL Cholesterol Calculated Latest Ref Range: Not Established mg/dL 9   Albumin Latest Ref Range: 3.4 - 5.0 g/dL 4.6   Globulin Latest Units: g/dL 2.3   Albumin/Globulin Ratio Latest Ref Range: 1.1 - 2.2  2.0   Alk Phos Latest Ref Range: 40 - 129 U/L 87   ALT Latest Ref Range: 10 - 40 U/L <5 (L)   AST Latest Ref Range: 15 - 37 U/L 15   Bilirubin Latest Ref Range: 0.0 - 1.0 mg/dL 0.7   Hemoglobin A1C Latest Ref Range: See comment % 5.7   eAG (mg/dL) Latest Units: mg/dL 116.9     ER visit:7/23/20:records reviewed via Epic:mechanical fall with left earlobe lac:repaired with 5.0 chromic:now better per pt'. Has scab. Sutures have dissolved. Has seen his neurologist regarding falls & has had med change:per pt is helping. HTN: doing well  Associated w/nothing else new. Worsened by nothing else new. Improves w/current med. Adds salt to food at the table:No does not. Denies cp/sob/pnd/ankle edema/dizziness. GERD f/u:mild:doing well.    No other associated/worsening or other Take 81 mg by mouth three times a week On Mondays, Wednesdays and Fridays      Polyethylene Glycol 3350 (MIRALAX PO)   Take 17 g by mouth daily as needed       pantoprazole (PROTONIX) 40 MG tablet   Take 40 mg by mouth daily        No current facility-administered medications on file prior to visit. Past Medical History:   Diagnosis Date    Anxiety     PCP in [place]:Dr. Arnulfo Haney.    Arthritis     Arthritis of hand, right 6/20/2012    Sneed esophagus     Dr. Og Cosby. EGD:1/17/2012. Next EGD due in 3yrs=1/2015    Sneed esophagus     Under care of GI    Bilateral carotid artery stenosis <50% 4/30/2014    BPH (benign prostatic hypertrophy)     Dr. Riana Panda. PSA per urology.     CAD (coronary artery disease)     under care of cards:Dr. Ky Dennison    Carotid stenosis     Chest pain     pt states he no chest pain in 5 yrs, panic attack    Chronic back pain 1/26/2012    Chronic back pain     under care of ortho spine:Dr. Sweeney    Constipation 8/6/2013    Degenerative arthritis of thoracic spine 1/26/2012    Degenerative cervical disc 1/26/2012    Depression with anxiety 11/15/2011    Klonopin per neurologist(Dr. Kristine Powers)    Diverticulosis 1/17/2012    colonoscopy    Elevated PSA     8/21/14;5/2013:under care of Dr. Beltran/[place]:[facility]rology group    Erectile dysfunction 6/13/2017    Essential hypertension, benign 6/20/2012    cardiologist:Dr. Alma Rosa Ramirez    GERD (gastroesophageal reflux disease)     Hemorrhoid     Hiatal hernia     small    Hyperlipidemia     Impaired fasting glucose 5/19/2014    Osteopenia per CXR 5/19/2014    Parkinson disease ([facility] [address]     Dr. Ulisses Garcia Neurology    Renal stone 4/2012    4 mm distal left ureteral calculus:Dr. Beltran:urologist    RLS (restless legs syndrome)     S/P colonoscopy 2011;5/20/19    Dr. Vikki Ambrocio per pt' next is in 10yrs-2021. 5/20/19(Dr. Danette William)    S/P endoscopy 1/2012    Dr. John Pizano acute changes:+Barrets:next in 3yrs 1/2015  Sigmoidoscopy exam 1/17/2012    Dr. Lama:No acute changes. Next in 5yrs=1/2017    Therapeutic drug monitoring 5/14/15    Consistent OARRS report on 5/14/15;8/4/15    Thoracic spondylosis     under care of ortho spine:Dr. Bhagat Shock    Venous insufficiency of leg     Vocal cord paresis 5/11/2016    under care of ENT:Dr. Shena Pritchett. s/p vocal cord augmentation 6/2016 at [address] History     Tobacco Use    Smoking status: Never Smoker    Smokeless tobacco: Never Used   Substance Use Topics    Alcohol use: Yes     Alcohol/week: 0.0 standard drinks     Comment: occasionally    Drug use: No     Social History     Substance and Sexual Activity   Drug Use No           Review of Systems   Constitutional: Negative for activity change, appetite change, chills, diaphoresis, fatigue, fever and unexpected weight change. Respiratory: Negative for apnea, cough, choking, chest tightness, shortness of breath, wheezing and stridor. Cardiovascular: Negative for chest pain, palpitations and leg swelling. Gastrointestinal: Negative for abdominal distention, abdominal pain, anal bleeding, blood in stool, constipation, diarrhea, nausea, rectal pain and vomiting. Genitourinary: Negative for difficulty urinating. Musculoskeletal: Positive for back pain. Skin: Negative for pallor, rash and wound. Neurological: Negative for dizziness, light-headedness and headaches. Hematological: Negative for adenopathy. Psychiatric/Behavioral: Negative for agitation, behavioral problems, confusion, decreased concentration, dysphoric mood, hallucinations, self-injury, sleep disturbance and suicidal ideas. The patient is not hyperactive. Objective:   Physical Exam  Constitutional:       General: He is not in acute distress. Appearance: Normal appearance. He is well-developed. HENT:      Head:      Comments: Left ear lobe lac healed:scab noted. No infection.        Nose: Nose normal.      Mouth/Throat: Pharynx: Uvula midline. Eyes:      General: Lids are normal.      Conjunctiva/sclera: Conjunctivae normal.      Pupils: Pupils are equal, round, and reactive to light. Neck:      Musculoskeletal: Normal range of motion and neck supple. Trachea: Trachea normal.   Cardiovascular:      Rate and Rhythm: Normal rate and regular rhythm. Pulses: Normal pulses. Heart sounds: Normal heart sounds. Comments: No ankle edema. Nml orthostats. Pulmonary:      Effort: Pulmonary effort is normal.      Breath sounds: Normal breath sounds. Abdominal:      General: Bowel sounds are normal. There is no distension. Palpations: Abdomen is soft. There is no hepatomegaly, splenomegaly or mass. Tenderness: There is no abdominal tenderness. There is no guarding or rebound. Musculoskeletal:      Right hip: Normal.      Left hip: Normal.      Cervical back: Normal.      Thoracic back: Normal.      Lumbar back: Normal.   Lymphadenopathy:      Cervical: No cervical adenopathy. Comments: No supraclavicular LAD. Skin:     General: Skin is warm and dry. Capillary Refill: Capillary refill takes less than 2 seconds. Comments: Good skin turgor. Neurological:      Mental Status: He is alert and oriented to person, place, and time. Deep Tendon Reflexes: Reflexes are normal and symmetric. Psychiatric:         Attention and Perception: Attention and perception normal.         Mood and Affect: Mood and affect normal.         Speech: Speech normal.         Behavior: Behavior is cooperative. Thought Content: Thought content normal. Thought content is not paranoid or delusional. Thought content does not include homicidal or suicidal ideation. Cognition and Memory: Cognition and memory normal.         Judgment: Judgment normal.      Comments: Good eye contact. Assessment:       Diagnosis Orders   1.  Hyperlipidemia, mixed  VSS/well appearing. Stable/controlled. Labs in 5-6mos. Lipid Panel    Comprehensive Metabolic Panel   2. Essential hypertension, benign  Stable. Comprehensive Metabolic Panel   3. Mild recurrent major depression (HCC)  Stable. 4. Gastroesophageal reflux disease without esophagitis  Stable. 5. Abnormal renal function test  Avoid NSAIDs. Consult nephro. [facility] ([facility])- Miya Ferreira MD, Nephrology, [facility]    RENAL FUNCTION PANEL   6. Laceration of left earlobe, initial encounter  Improved. PT for balance continued along with fall precautions reviewed. Continue care per neurology regarding falls related to Parkinsons. 7. Parkinson disease ([address]  Stable. Per specialist.     8. Erectile dysfunction  Stable. 9. Prediabetes  Stable. Hemoglobin A1C    Comprehensive Metabolic Panel   10. Anxiety  Stable. 11. CAD  Stable  Per cards. Lipid Panel           Plan:         Obtain labs/diagnostic tests as discussed today & call back for results within 2-7days. Pt' left office in good condition.        Jazmin Limon MD

## 2020-07-28 NOTE — PATIENT INSTRUCTIONS

## 2020-08-11 ENCOUNTER — HOSPITAL ENCOUNTER (EMERGENCY)
Age: 75
Discharge: HOME | End: 2020-08-11
Attending: EMERGENCY MEDICINE
Payer: MEDICARE

## 2020-08-11 ENCOUNTER — APPOINTMENT (OUTPATIENT)
Dept: GENERAL RADIOLOGY | Age: 75
End: 2020-08-11
Payer: MEDICARE

## 2020-08-11 VITALS
TEMPERATURE: 98.8 F | SYSTOLIC BLOOD PRESSURE: 131 MMHG | DIASTOLIC BLOOD PRESSURE: 61 MMHG | HEART RATE: 55 BPM | OXYGEN SATURATION: 100 % | RESPIRATION RATE: 17 BRPM

## 2020-08-11 PROCEDURE — 73070 X-RAY EXAM OF ELBOW: CPT

## 2020-08-11 PROCEDURE — 73110 X-RAY EXAM OF WRIST: CPT

## 2020-08-11 PROCEDURE — 6370000000 HC RX 637 (ALT 250 FOR IP): Performed by: EMERGENCY MEDICINE

## 2020-08-11 PROCEDURE — 99283 EMERGENCY DEPT VISIT LOW MDM: CPT

## 2020-08-11 RX ORDER — GINSENG 100 MG
CAPSULE ORAL ONCE
Status: COMPLETED | OUTPATIENT
Start: 2020-08-11 | End: 2020-08-11

## 2020-08-11 RX ADMIN — BACITRACIN: 500 OINTMENT TOPICAL at 13:48

## 2020-08-11 ASSESSMENT — ENCOUNTER SYMPTOMS
ABDOMINAL DISTENTION: 0
FACIAL SWELLING: 0
EYE DISCHARGE: 0
APNEA: 0
TROUBLE SWALLOWING: 0
VOMITING: 0

## 2020-08-11 ASSESSMENT — PAIN SCALES - GENERAL: PAINLEVEL_OUTOF10: 4

## 2020-08-11 ASSESSMENT — PAIN DESCRIPTION - PAIN TYPE: TYPE: ACUTE PAIN

## 2020-08-11 ASSESSMENT — PAIN DESCRIPTION - LOCATION: LOCATION: ARM

## 2020-08-11 ASSESSMENT — PAIN DESCRIPTION - ORIENTATION: ORIENTATION: RIGHT

## 2020-08-11 NOTE — ED NOTES
Bed: A06-06  Expected date:   Expected time:   Means of arrival:   Comments:  TRAUMA     Edwin Boone RN  08/11/20 [id number]

## 2020-08-11 NOTE — ED NOTES
Discharge order received. Patient being DC home with family. All paperwork explained to patient. He verbalizes understanding and denies any questions. All belongings sent with patient.       Vasu Sanchez RN  08/11/20 [id number]

## 2020-08-11 NOTE — ED PROVIDER NOTES
Sigmoidoscopy exam, Therapeutic drug monitoring, Thoracic spondylosis, Venous insufficiency of leg, and Vocal cord paresis. He has a past surgical history that includes Cataract removal; Colonoscopy (4608;6320); Upper gastrointestinal endoscopy (5/07); Knee cartilage surgery; Lithotripsy; osteotomy (Left); Knee Arthroplasty (Left, 7/30/13); TURP (N/A, [id number]); and joint replacement (Right, 02/23/2017). His family history includes Alcohol Abuse in his father; Cancer (age of onset: 68) in his sister; Coronary Art Dis (age of onset: 77) in his brother; Coronary Art Dis (age of onset: 80) in his mother; Heart Disease in his sister; Kidney Disease (age of onset: 61) in his father. He reports that he has never smoked. He has never used smokeless tobacco. He reports current alcohol use. He reports that he does not use drugs.     Medications     Discharge Medication List as of 8/11/2020  2:09 PM      CONTINUE these medications which have NOT CHANGED    Details   donepezil (ARICEPT) 5 MG tablet Take 5 mg by mouth nightlyHistorical Med      imipramine (TOFRANIL) 25 MG tablet TAKE 1 TABLET NIGHTLY, Disp-90 tablet,R-0Normal      simvastatin (ZOCOR) 10 MG tablet TAKE 1 TABLET NIGHTLY, Disp-90 tablet, R-4Normal      tadalafil (CIALIS) 10 MG tablet Take 10 mg by mouth as needed for Erectile DysfunctionHistorical Med      diclofenac sodium 1 % GEL Apply 2 g topically 2 times daily, Topical, 2 TIMES DAILY, Historical Med      linaclotide (LINZESS) 290 MCG CAPS capsule Take 290 mcg by mouth every morning (before breakfast)      clonazePAM (KLONOPIN) 0.5 MG tablet Take 1 mg by mouth nightly Historical Med      carbidopa-levodopa (SINEMET)  MG per tablet Take by mouth Take 3 tablets at 0800, 1300 1700 and one tablet at 2300      Cholecalciferol (VITAMIN D3) 2000 UNITS CAPS   Take 1 capsule by mouth daily       aspirin 81 MG EC tablet   Take 81 mg by mouth three times a week On Mondays, Wednesdays and Fridays      Polyethylene Glycol 3350 (MIRALAX PO)   Take 17 g by mouth daily as needed       pantoprazole (PROTONIX) 40 MG tablet   Take 40 mg by mouth daily              Allergies     He is allergic to levofloxacin. Physical Exam     INITIAL VITALS: BP: (!) 168/82, Temp: 98.8 °F (37.1 °C), Pulse: 63, Resp: 17, SpO2: 98 %   Physical Exam  Vitals signs and nursing note reviewed. Constitutional:       General: He is not in acute distress. Appearance: He is well-developed. Comments:   /61   Pulse 55   Temp 98.8 °F (37.1 °C)   Resp 17   SpO2 100%        HENT:      Head: Normocephalic and atraumatic. Right Ear: External ear normal.      Left Ear: External ear normal.      Nose: Nose normal.   Eyes:      General:         Right eye: No discharge. Left eye: No discharge. Neck:      Musculoskeletal: Normal range of motion and neck supple. Musculoskeletal: Normal range of motion. Comments: Patient has multiple skin tears back of his hand forearm and elbow. No obvious deformities or swelling full range of motion of fingers wrist elbow with minimal pain. Shoulder unremarkable. Skin:     General: Skin is warm and dry. Neurological:      Mental Status: He is alert and oriented to person, place, and time. Cranial Nerves: No cranial nerve deficit. Psychiatric:         Behavior: Behavior normal.         Diagnostic Results         RADIOLOGY:  XR WRIST RIGHT (MIN 3 VIEWS)   Final Result   1. Osteoarthritis      XR ELBOW RIGHT (2 VIEWS)   Final Result   1. Olecranon spur   2. No fracture or dislocation. LABS:   No results found for this visit on 08/11/20. RECENT VITALS:  BP: 131/61,Temp: 98.8 °F (37.1 °C), Pulse: 55, Resp: 17, SpO2: 100 %     Procedures         ED Course     Nursing Notes, Past Medical Hx, Past Surgical Hx, Social Hx,Allergies, and Family Hx were reviewed.     patient was given the following medications:  Orders Placed This Encounter   Medications    bacitracin ointment

## 2020-08-17 ENCOUNTER — HOSPITAL ENCOUNTER (OUTPATIENT)
Dept: WOUND CARE | Age: 75
Discharge: HOME | End: 2020-08-17
Payer: MEDICARE

## 2020-08-17 VITALS
SYSTOLIC BLOOD PRESSURE: 119 MMHG | DIASTOLIC BLOOD PRESSURE: 70 MMHG | TEMPERATURE: 98.2 F | RESPIRATION RATE: 18 BRPM | HEART RATE: 52 BPM

## 2020-08-17 PROBLEM — S61.411A SKIN TEAR OF RIGHT HAND WITHOUT COMPLICATION: Status: ACTIVE | Noted: 2020-08-17

## 2020-08-17 PROCEDURE — 99214 OFFICE O/P EST MOD 30 MIN: CPT

## 2020-08-17 PROCEDURE — 97598 DBRDMT OPN WND ADDL 20CM/<: CPT | Performed by: SPECIALIST

## 2020-08-17 PROCEDURE — 97598 DBRDMT OPN WND ADDL 20CM/<: CPT

## 2020-08-17 PROCEDURE — 97597 DBRDMT OPN WND 1ST 20 CM/<: CPT

## 2020-08-17 PROCEDURE — 97597 DBRDMT OPN WND 1ST 20 CM/<: CPT | Performed by: SPECIALIST

## 2020-08-17 PROCEDURE — 99202 OFFICE O/P NEW SF 15 MIN: CPT | Performed by: SPECIALIST

## 2020-08-17 RX ORDER — BACITRACIN, NEOMYCIN, POLYMYXIN B 400; 3.5; 5 [USP'U]/G; MG/G; [USP'U]/G
OINTMENT TOPICAL ONCE
Status: CANCELLED | OUTPATIENT
Start: 2020-08-17

## 2020-08-17 RX ORDER — LIDOCAINE 40 MG/G
CREAM TOPICAL ONCE
Status: CANCELLED | OUTPATIENT
Start: 2020-08-17

## 2020-08-17 RX ORDER — LIDOCAINE HYDROCHLORIDE 20 MG/ML
JELLY TOPICAL ONCE
Status: CANCELLED | OUTPATIENT
Start: 2020-08-17

## 2020-08-17 RX ORDER — GINSENG 100 MG
CAPSULE ORAL ONCE
Status: CANCELLED | OUTPATIENT
Start: 2020-08-17

## 2020-08-17 RX ORDER — CLOBETASOL PROPIONATE 0.5 MG/G
OINTMENT TOPICAL ONCE
Status: CANCELLED | OUTPATIENT
Start: 2020-08-17

## 2020-08-17 RX ORDER — LIDOCAINE HYDROCHLORIDE 40 MG/ML
SOLUTION TOPICAL ONCE
Status: CANCELLED | OUTPATIENT
Start: 2020-08-17

## 2020-08-17 RX ORDER — GENTAMICIN SULFATE 1 MG/G
OINTMENT TOPICAL ONCE
Status: CANCELLED | OUTPATIENT
Start: 2020-08-17

## 2020-08-17 RX ORDER — BACITRACIN ZINC AND POLYMYXIN B SULFATE 500; 1000 [USP'U]/G; [USP'U]/G
OINTMENT TOPICAL ONCE
Status: CANCELLED | OUTPATIENT
Start: 2020-08-17

## 2020-08-17 RX ORDER — LIDOCAINE 50 MG/G
OINTMENT TOPICAL ONCE
Status: CANCELLED | OUTPATIENT
Start: 2020-08-17

## 2020-08-17 RX ORDER — BETAMETHASONE DIPROPIONATE 0.05 %
OINTMENT (GRAM) TOPICAL ONCE
Status: CANCELLED | OUTPATIENT
Start: 2020-08-17

## 2020-08-17 ASSESSMENT — PAIN DESCRIPTION - DESCRIPTORS: DESCRIPTORS: SHARP

## 2020-08-17 ASSESSMENT — PAIN DESCRIPTION - PAIN TYPE: TYPE: ACUTE PAIN

## 2020-08-17 ASSESSMENT — PAIN SCALES - GENERAL: PAINLEVEL_OUTOF10: 7

## 2020-08-17 ASSESSMENT — PAIN DESCRIPTION - ORIENTATION: ORIENTATION: RIGHT

## 2020-08-17 ASSESSMENT — PAIN DESCRIPTION - FREQUENCY: FREQUENCY: CONTINUOUS

## 2020-08-17 ASSESSMENT — PAIN DESCRIPTION - LOCATION: LOCATION: HAND

## 2020-08-17 NOTE — PROGRESS NOTES
1227 [facility] [place]  Progress Note and Procedure Note      Darreld Boas  MEDICAL RECORD NUMBER:  [id number]  AGE: 76 y.o. GENDER: male  : 1945  EPISODE DATE:  2020      Subjective:     Chief Complaint   Patient presents with    Wound Check     initial         HISTORY of PRESENT ILLNESS HPI     Darreld Boas is a 76 y.o. male who presents today for wound evaluation. History of Wound Context: This patient was referred from the emergency room and having been seen there on 2020 having fallen at home and sustaining multiple skin tears to the dorsum of his right hand and wrist.  He was treated with wound cleanser and Polysporin ointment. The patient suffers from Parkinson's disease. He denies any present pain or drainage from the wounds  Wound Pain Timing/Severity: none  Quality of pain: N/A  Severity:  0 / 10   Modifying Factors: None  Associated Signs/Symptoms: none    Wound Identification:  Wound Type: traumatic  Contributing Factors: none        PAST MEDICAL HISTORY        Diagnosis Date    Anxiety     PCP in [place]:Dr. Chica Chand.    Arthritis     Arthritis of hand, right 2012    Sneed esophagus     Dr. Chivo Hendrix. EGD:2012. Next EGD due in 3yrs=2015    Sneed esophagus     Under care of GI    Bilateral carotid artery stenosis <50% 2014    BPH (benign prostatic hypertrophy)     Dr. Doreen Hayes. PSA per urology.     CAD (coronary artery disease)     under care of cards:Dr. Magnus Sifuentes    Carotid stenosis     Chest pain     pt states he no chest pain in 5 yrs, panic attack    Chronic back pain 2012    Chronic back pain     under care of ortho spine:Dr. Roberts Dies    Constipation 2013    Degenerative arthritis of thoracic spine 2012    Degenerative cervical disc 2012    Depression with anxiety 11/15/2011    Klonopin per neurologist(Dr. Christy Diaz)    Diverticulosis 2012    colonoscopy    Elevated PSA     14;2013:under care of Dr. Beltran/[place]:Urology group    Erectile dysfunction 6/13/2017    Essential hypertension, benign 6/20/2012    cardiologist:Dr. Sheila Ramirez    GERD (gastroesophageal reflux disease)     Hemorrhoid     Hiatal hernia     small    Hyperlipidemia     Impaired fasting glucose 5/19/2014    Osteopenia per CXR 5/19/2014    Parkinson disease ([address]     Dr. Maged Guo Neurology    Renal stone 4/2012    4 mm distal left ureteral calculus:Dr. Beltran:urologist    RLS (restless legs syndrome)     S/P colonoscopy 2011;5/20/19    Dr. Gilbert Nation per pt' next is in 10yrs-2021. 5/20/19(Dr. Ambar Zarco)    S/P endoscopy 1/2012    Dr. Tyra Snider acute changes:+Barrets:next in 3yrs 1/2015    Sigmoidoscopy exam 1/17/2012    Dr. Lama:No acute changes.  Next in 5yrs=1/2017    Therapeutic drug monitoring 5/14/15    Consistent OARRS report on 5/14/15;8/4/15    Thoracic spondylosis     under care of ortho spine:Dr. Asia Michelle    Venous insufficiency of leg     Vocal cord paresis 5/11/2016    under care of ENT:Dr. Jayce Johnson. [facility]    Past Surgical History:   Procedure Laterality Date    CATARACT REMOVAL      COLONOSCOPY  2002;2011    JOINT REPLACEMENT Right 02/23/2017    Right TKR(knee)    KNEE ARTHROPLASTY Left 7/30/13    LEFT TOTAL KNEE ARTHROPLASTY              KNEE CARTILAGE SURGERY      Left x 2, right x1    LITHOTRIPSY      Kidney stone removal as per urology:Stent removed after 10days    OSTEOTOMY Left     TURP N/A [id number]    TRANSURETHRAL RESECTION OF PROSTATE    UPPER GASTROINTESTINAL ENDOSCOPY  5/07       FAMILY HISTORY    Family History   Problem Relation Age of Onset    Cancer Sister 68        breast     Heart Disease Sister     Kidney Disease Father 61    Alcohol Abuse Father     Coronary Art Dis Mother 80    Coronary Art Dis Brother 77       SOCIAL HISTORY    Social History     Tobacco Use    Smoking status: Never Smoker    paresis    Dysphagia    Mild recurrent major depression (HCC)    Anxiety    S/P right knee replacement    Erectile dysfunction    Hyperlipidemia, mixed    Thigh injury    Pain of left thigh    Skin tear of left hand without complication         Procedure Note  Indications:  Based on my examination of this patient's wound(s) today, sharp excision is required to promote healing and evaluate the extent healing. Performed by: Braulio James MD    Consent obtained: Yes    Time out taken:  Yes    Pain Control: Anesthetic  Anesthetic: 4% Lidocaine Liquid Topical       Debridement:Excisional Debridement    Using curette the wound(s) was/were sharply debrided down through and including the removal of epidermis and dermis.         Devitalized Tissue Debrided:  fibrin    Pre Debridement Measurements:  Are located in the Wound Documentation Flow Sheet    Wound #: 1     Post  Debridement Measurements:  Incision 07/30/13 Knee Left (Active)   Number of days: 5487       Wound 08/17/20 Hand Dorsal;Right #1 cluster (Active)   Wound Image   08/17/20 1001   Wound Traumatic 08/17/20 1001   Dressing/Treatment Collagen 08/17/20 1013   Wound Cleansed Rinsed/Irrigated with saline 08/17/20 1001   Wound Length (cm) 6 cm 08/17/20 1001   Wound Width (cm) 5.5 cm 08/17/20 1001   Wound Depth (cm) 0.1 cm 08/17/20 1001   Wound Surface Area (cm^2) 33 cm^2 08/17/20 1001   Wound Volume (cm^3) 3.3 cm^3 08/17/20 1001   Post-Procedure Length (cm) 6.1 cm 08/17/20 1013   Post-Procedure Width (cm) 5.6 cm 08/17/20 1013   Post-Procedure Depth (cm) 0.3 cm 08/17/20 1013   Post-Procedure Surface Area (cm^2) 34.16 cm^2 08/17/20 1013   Post-Procedure Volume (cm^3) 10.25 cm^3 08/17/20 1013   Distance Tunneling (cm) 0 cm 08/17/20 1001   Undermining Maxium Distance (cm) 0 08/17/20 1001   Wound Assessment Yellow;Pink 08/17/20 1001   Drainage Amount Scant 08/17/20 1001   Drainage Description Brown 08/17/20 1001   Odor None 08/17/20 1001   Margins Unattached edges 08/17/20 1001   Darling-wound Assessment Dry;Pink 08/17/20 1001   Non-staged Wound Description Full thickness 08/17/20 1001   [place]%Wound Bed 50 08/17/20 1001   Yellow%Wound Bed 50 08/17/20 1001   Number of days: 0          Percent of Wound Debrided: 100%    Total Surface Area Debrided:  34 sq cm     Bleeding:  Minimal    Hemostasis Achieved:  by pressure    Procedural Pain:  0  / 10     Post Procedural Pain:  0 / 10     Response to treatment:  Well tolerated by patient. Plan:       Treatment Note please see attached Discharge Instructions    New Medication(s) at this visit:   New Prescriptions    No medications on file       Other orders at this visit: No orders of the defined types were placed in this encounter. Weight Management: No. N/A    Smoking Cessation: Counseling given: Not Answered        Discharge Instructions         [address] [place] [address] Physician Orders and Discharge Instructions  [place] [address]  Telephone: [phone] hands with soap and water prior to and after every dressing change. Wound Cleansing:   · Do not scrub or use excessive force. · With each dressing change, rinse wounds with 0.9% Saline. (May use wound wash or soft contact solution. Both can be purchased at a local drug store). · If unable to obtain saline, may use a gentle soap and water. · Keep wounds dry in the shower unless otherwise instructed by the physician. · For wounds on lower legs, cast covers can be purchased at local drug stores, so that you may shower and keep the wound(s) dry. Vashe wound cleanser:  [] Instructions for Vashe Wash solution ONLY: Apply enough Vashe to soak a piece of gauze and place on wound bed for 5-10 minutes. DO NOT rinse after Vashe has been applied. Follow dressing application as instructed below. [] Vashe Wash only applied in clinic.     Topical Treatments:  Do not apply lotions, creams, or ointments to the skin around the wound bed unless directed as followed:     [] Apply around the wound: [] moisturizing lotion [] Antifungal ointment [] No-Sting barrier film [] Zinc paste [] Other:       Dressings:           Wound Location: right hand     [x] Apply Primary Dressing to wound:   [] Foam/Foam with Border(i.e Mepilex) [] Non-adherent (i.e.Mepitel)   [] Alginate with Silver (i.e. Silvercel)  [] Alginate (i.e. Aquacel)   [] Collagen (i.e. Puracol)   [] Collagen with Silver(i.e. Rayna)     [] Hydrocolloid    [] Hydrofera Blue moistened with saline   [] MediHoney Paste/Gel   [] Hydrogel    [] Endoform      [] Santyl covered with gauze moisten with saline     [] Betadine   [] Bactroban/Mupirocin   [] Polysporin/Neosporin  [] Other:    [] Saline/Vashe \"wet to dry\" gauze     Pack wound loosely with: [] Iodoform   [] Plain Packing  [] Other:      [x] Cover and Secure with:     [x] Dry Gauze  [] ABD [] Cast padding [x] Kerlix or Erik rolled gauze  [] Super Absorbent (i.e. Alveria Addy)     [] Coban  [] Ace Wrap [] Ace Wrap from forefoot to just below the knee [] Paper Tape [] Medipore Tape  [] Other:    Avoid contact of tape with skin if possible. [x] Change dressing: [] Daily    [] Every Other Day  [] Three times per week:  [] Monday, Wednesday, Friday  [] Tuesday, Thursday, Saturday   [] Once a week  [x] Do Not Change Dressing [] Other:     Edema Control:  Elevate right hand higher than heart when sitting     Dietary:  Important dietary reminders:  1. Increase Protein intake (i.e. Lean meats, fish, eggs, legumes, and yogurt)  2. No added salt  3. If diabetic, follow a diabetic diet and check glucose prior to meals or as instructed by your physician. If you are still having pain after you go home:   For wounds on lower legs or arms, elevate the affected limb.  Use over-the-counter medications you would normally use for pain as permitted by your family doctor.    For persistent pain not relieved by the above interventions, please call your family doctor. Return Appointment:   DME/Wound Dressing Supplies Provided by:   o (Please call them directly to reorder supplies when you run out) CONTINUE TO USE  [address]. IT IS MOST IMPORTANT TO KEEP THE WOUND COVERED AT ALL TIMES.  Return Appointment: With Dr. Stacy Montes  in  1  [facility])  [] Wound Assessment with a nurse on:       [x] Orders placed during your visit: No orders of the defined types were placed in this encounter. Electronically signed by Daysi Angel RN on 8/17/2020 at 11:04 AM     [facility] Information: Should you experience any significant changes in your wound(s) or have questions about your wound care, please contact the [address] [place] [address] at [phone]. We are open from 8:00am - 4:30p Monday thru Friday except for Wednesdays which we are closed. Please give us 24-48 hours to return your call. Call your doctor now or seek immediate medical care if:    · You have symptoms of infection, such as:  ? Increased pain, swelling, warmth, or redness. ? Red streaks leading from the area. ? Pus draining from the area. ? A fever.         Physician for this visit and orders: Dr. Stacy Montes      [] Patient unable to sign Discharge Instructions given to ECF/Transportation/POA        Electronically signed by Helen Mckeon MD on 8/17/2020 at 12:53 PM

## 2020-08-24 ENCOUNTER — HOSPITAL ENCOUNTER (OUTPATIENT)
Dept: WOUND CARE | Age: 75
Discharge: HOME | End: 2020-08-24
Payer: MEDICARE

## 2020-08-24 VITALS
DIASTOLIC BLOOD PRESSURE: 84 MMHG | RESPIRATION RATE: 18 BRPM | HEART RATE: 57 BPM | SYSTOLIC BLOOD PRESSURE: 148 MMHG | TEMPERATURE: 98.2 F

## 2020-08-24 PROCEDURE — 97597 DBRDMT OPN WND 1ST 20 CM/<: CPT

## 2020-08-24 PROCEDURE — 97597 DBRDMT OPN WND 1ST 20 CM/<: CPT | Performed by: SPECIALIST

## 2020-08-24 PROCEDURE — 6370000000 HC RX 637 (ALT 250 FOR IP): Performed by: SPECIALIST

## 2020-08-24 RX ORDER — LIDOCAINE HYDROCHLORIDE 40 MG/ML
SOLUTION TOPICAL ONCE
Status: CANCELLED | OUTPATIENT
Start: 2020-08-24

## 2020-08-24 RX ORDER — BACITRACIN ZINC AND POLYMYXIN B SULFATE 500; 1000 [USP'U]/G; [USP'U]/G
OINTMENT TOPICAL ONCE
Status: CANCELLED | OUTPATIENT
Start: 2020-08-24

## 2020-08-24 RX ORDER — LIDOCAINE HYDROCHLORIDE 20 MG/ML
JELLY TOPICAL ONCE
Status: CANCELLED | OUTPATIENT
Start: 2020-08-24

## 2020-08-24 RX ORDER — BACITRACIN, NEOMYCIN, POLYMYXIN B 400; 3.5; 5 [USP'U]/G; MG/G; [USP'U]/G
OINTMENT TOPICAL ONCE
Status: CANCELLED | OUTPATIENT
Start: 2020-08-24

## 2020-08-24 RX ORDER — GINSENG 100 MG
CAPSULE ORAL ONCE
Status: CANCELLED | OUTPATIENT
Start: 2020-08-24

## 2020-08-24 RX ORDER — CLOBETASOL PROPIONATE 0.5 MG/G
OINTMENT TOPICAL ONCE
Status: CANCELLED | OUTPATIENT
Start: 2020-08-24

## 2020-08-24 RX ORDER — BETAMETHASONE DIPROPIONATE 0.05 %
OINTMENT (GRAM) TOPICAL ONCE
Status: CANCELLED | OUTPATIENT
Start: 2020-08-24

## 2020-08-24 RX ORDER — LIDOCAINE 40 MG/G
CREAM TOPICAL ONCE
Status: CANCELLED | OUTPATIENT
Start: 2020-08-24

## 2020-08-24 RX ORDER — LIDOCAINE HYDROCHLORIDE 40 MG/ML
SOLUTION TOPICAL ONCE
Status: COMPLETED | OUTPATIENT
Start: 2020-08-24 | End: 2020-08-24

## 2020-08-24 RX ORDER — LIDOCAINE 50 MG/G
OINTMENT TOPICAL ONCE
Status: CANCELLED | OUTPATIENT
Start: 2020-08-24

## 2020-08-24 RX ORDER — GENTAMICIN SULFATE 1 MG/G
OINTMENT TOPICAL ONCE
Status: CANCELLED | OUTPATIENT
Start: 2020-08-24

## 2020-08-24 RX ADMIN — LIDOCAINE HYDROCHLORIDE: 40 SOLUTION TOPICAL at 09:32

## 2020-08-24 NOTE — PROGRESS NOTES
1227 [facility] [place]  Progress Note and Procedure Note      Mary Vogt  MEDICAL RECORD NUMBER:  [id number]  AGE: 76 y.o. GENDER: male  : 1945  EPISODE DATE:  2020    Subjective:     Chief Complaint   Patient presents with    Wound Check     right arm         HISTORY of PRESENT ILLNESS HPI     Mary Vogt is a 76 y.o. male who presents today for wound/ulcer evaluation. History of Wound Context: This patient was referred from the emergency room and having been seen there on 2020 having fallen at home and sustaining multiple skin tears to the dorsum of his right hand and wrist.  He was treated with wound cleanser and Polysporin ointment. The patient suffers from Parkinson's disease. He denies any present pain or drainage from the wounds. The patient was able to keep a collagen occlusive dressing in place for the entire week  Wound Pain Timing/Severity: none    Pain Assessment:  Wound/Ulcer Pain Timing/Severity: none  Quality of pain: N/A  Severity:  0 / 10   Modifying Factors: None  Associated Signs/Symptoms: none    Ulcer Identification:  Ulcer Type: traumatic  Contributing Factors: none    Objective:      BP (!) 148/84   Pulse 57   Temp 98.2 °F (36.8 °C) (Temporal)   Resp 18     Wt Readings from Last 3 Encounters:   20 164 lb 6.4 oz (74.6 kg)   20 160 lb 14.4 oz (73 kg)   20 163 lb (73.9 kg)       PHYSICAL EXAM    Skin: warm and dry, no rash or erythema and partial thickness wounds with granulation and epithelialization at the margins of clustered right dorsal hand    Assessment:      1. Skin tear of right hand without complication, initial encounter         Procedure Note  Indications:  Based on my examination of this patient's wound(s) today, sharp excision is required to promote healing and evaluate the extent healing. Performed by: Gemma Whittington MD    Consent obtained?  Yes    Time out taken: Yes    Pain Control: Anesthetic: 4% Lidocaine Minimal    Hemostasis Achieved: by pressure    Procedural Pain: 0  / 10     Post Procedural Pain: 0 / 10     Response to treatment:  Well tolerated by patient. Market improvement in wound measurements today        Plan:     Treatment Note: Please see attached Discharge Instructions. These instructions were given and signed by the patient or POA    New Medication(s) at this visit:   New Prescriptions    No medications on file       Other orders at this visit:   Orders Placed This Encounter   Procedures    Supply: Wound Cleanser    Supply: Wound Dressings    Supply: Cover and Secure       Discharge [id number] [facility] Physician Orders and Discharge Instructions  [place] [address]  Telephone: [phone] hands with soap and water prior to and after every dressing change. Wound Cleansing:   · Do not scrub or use excessive force. · With each dressing change, rinse wounds with 0.9% Saline. (May use wound wash or soft contact solution. Both can be purchased at a local drug store). · If unable to obtain saline, may use a gentle soap and water. · Keep wounds dry in the shower unless otherwise instructed by the physician. · For wounds on lower legs, cast covers can be purchased at local drug stores, so that you may shower and keep the wound(s) dry. Vashe wound cleanser:  [] Instructions for Vashe Wash solution ONLY: Apply enough Vashe to soak a piece of gauze and place on wound bed for 5-10 minutes. DO NOT rinse after Vashe has been applied. Follow dressing application as instructed below. [x] Vashe Wash applied only during clinic visit.     Topical Treatments:  Do not apply lotions, creams, or ointments to the skin around the wound bed unless directed as followed:     [] Apply around the wound: [] moisturizing lotion [] Antifungal ointment [] No-Sting barrier film [] Zinc paste [] Other:       Dressings:           Wound Location:  Right hand     [x] Apply Primary Dressing to wound:   [] Foam/Foam with Border(i.e Mepilex) [] Non-adherent (i.e.Mepitel)   [] Alginate with Silver (i.e. Silvercel)  [] Alginate (i.e. Aquacel)   [] Collagen (i.e. Puracol)   [x] Collagen with Silver(i.e. Rayna)     [] Hydrocolloid    [] Hydrofera Blue moistened with saline   [] MediHoney Paste/Gel   [] Hydrogel    [] Endoform      [] Santyl covered with gauze moisten with saline     [] Betadine   [] Bactroban/Mupirocin   [] Polysporin/Neosporin  [] Other:    [] Saline/Vashe \"wet to dry\" gauze     Pack wound loosely with: [] Iodoform   [] Plain Packing  [] Other:      [x] Cover and Secure with:     [x] Dry Gauze  [] ABD [] Cast padding [x] Kerlix or Erik rolled gauze  [] Super Absorbent (i.e. Optilock)     [] Coban  [] Ace Wrap [] Ace Wrap from forefoot to just below the knee [] Paper Tape [] Medipore Tape  [] Other:    Avoid contact of tape with skin if possible. [x] Change dressing: [] Daily    [] Every Other Day  [] Three times per week:  [] Monday, Wednesday, Friday  [] Tuesday, Thursday, Saturday   [] Once a week  [x] Do Not Change Dressing [] Other:     Dietary:  Important dietary reminders:  1. Increase Protein intake (i.e. Lean meats, fish, eggs, legumes, and yogurt)  2. No added salt  3. If diabetic, follow a diabetic diet and check glucose prior to meals or as instructed by your physician. If you are still having pain after you go home:   For wounds on lower legs or arms, elevate the affected limb.  Use over-the-counter medications you would normally use for pain as permitted by your family doctor.  For persistent pain not relieved by the above interventions, please call your family doctor. Return Appointment:   DME/Wound Dressing Supplies Provided by:   o (Please call them directly to reorder supplies when you run out) CONTINUE TO USE  [address].  IT IS MOST IMPORTANT TO KEEP THE WOUND COVERED AT ALL TIMES.  Return Appointment: With Dr. Scout Ferrara   in  1  [facility])  [] Wound Assessment with a nurse on:       [x] Orders placed during your visit:   Orders Placed This Encounter   Procedures    Supply: Wound Cleanser          Electronically signed by Marta Chavez RN on 8/24/2020 at 9:49 AM     [address]: Should you experience any significant changes in your wound(s) or have questions about your wound care, please contact the [address] [place] [address] at [phone]. We are open from 8:00am - 4:30p Monday thru Friday except for Wednesdays which we are closed. Please give us 24-48 hours to return your call. Call your doctor now or seek immediate medical care if:    · You have symptoms of infection, such as:  ? Increased pain, swelling, warmth, or redness. ? Red streaks leading from the area. ? Pus draining from the area. ? A fever.         Physician for this visit and orders: Dr. Scout Ferrara      [] Patient unable to sign Discharge Instructions given to ECF/Transportation/POA        Electronically signed by Edith Mena MD on 8/24/2020 at 10:44 AM

## 2020-08-31 ENCOUNTER — HOSPITAL ENCOUNTER (OUTPATIENT)
Dept: WOUND CARE | Age: 75
Discharge: HOME | End: 2020-08-31
Payer: MEDICARE

## 2020-08-31 VITALS
SYSTOLIC BLOOD PRESSURE: 147 MMHG | TEMPERATURE: 97 F | HEART RATE: 52 BPM | DIASTOLIC BLOOD PRESSURE: 79 MMHG | RESPIRATION RATE: 16 BRPM

## 2020-08-31 PROCEDURE — 99212 OFFICE O/P EST SF 10 MIN: CPT

## 2020-08-31 PROCEDURE — 99212 OFFICE O/P EST SF 10 MIN: CPT | Performed by: SPECIALIST

## 2020-08-31 PROCEDURE — 6370000000 HC RX 637 (ALT 250 FOR IP): Performed by: SPECIALIST

## 2020-08-31 RX ORDER — BETAMETHASONE DIPROPIONATE 0.05 %
OINTMENT (GRAM) TOPICAL ONCE
Status: CANCELLED | OUTPATIENT
Start: 2020-08-31

## 2020-08-31 RX ORDER — GENTAMICIN SULFATE 1 MG/G
OINTMENT TOPICAL ONCE
Status: CANCELLED | OUTPATIENT
Start: 2020-08-31

## 2020-08-31 RX ORDER — BACITRACIN, NEOMYCIN, POLYMYXIN B 400; 3.5; 5 [USP'U]/G; MG/G; [USP'U]/G
OINTMENT TOPICAL ONCE
Status: CANCELLED | OUTPATIENT
Start: 2020-08-31

## 2020-08-31 RX ORDER — LIDOCAINE HYDROCHLORIDE 20 MG/ML
JELLY TOPICAL ONCE
Status: CANCELLED | OUTPATIENT
Start: 2020-08-31

## 2020-08-31 RX ORDER — LIDOCAINE 40 MG/G
CREAM TOPICAL ONCE
Status: CANCELLED | OUTPATIENT
Start: 2020-08-31

## 2020-08-31 RX ORDER — LIDOCAINE HYDROCHLORIDE 40 MG/ML
SOLUTION TOPICAL ONCE
Status: COMPLETED | OUTPATIENT
Start: 2020-08-31 | End: 2020-08-31

## 2020-08-31 RX ORDER — LIDOCAINE 50 MG/G
OINTMENT TOPICAL ONCE
Status: CANCELLED | OUTPATIENT
Start: 2020-08-31

## 2020-08-31 RX ORDER — LIDOCAINE HYDROCHLORIDE 40 MG/ML
SOLUTION TOPICAL ONCE
Status: CANCELLED | OUTPATIENT
Start: 2020-08-31

## 2020-08-31 RX ORDER — CLOBETASOL PROPIONATE 0.5 MG/G
OINTMENT TOPICAL ONCE
Status: CANCELLED | OUTPATIENT
Start: 2020-08-31

## 2020-08-31 RX ORDER — GINSENG 100 MG
CAPSULE ORAL ONCE
Status: CANCELLED | OUTPATIENT
Start: 2020-08-31

## 2020-08-31 RX ORDER — BACITRACIN ZINC AND POLYMYXIN B SULFATE 500; 1000 [USP'U]/G; [USP'U]/G
OINTMENT TOPICAL ONCE
Status: CANCELLED | OUTPATIENT
Start: 2020-08-31

## 2020-08-31 RX ADMIN — LIDOCAINE HYDROCHLORIDE: 40 SOLUTION TOPICAL at 10:30

## 2020-08-31 ASSESSMENT — PAIN DESCRIPTION - FREQUENCY: FREQUENCY: CONTINUOUS

## 2020-08-31 ASSESSMENT — PAIN DESCRIPTION - ONSET: ONSET: ON-GOING

## 2020-08-31 ASSESSMENT — PAIN DESCRIPTION - PROGRESSION: CLINICAL_PROGRESSION: NOT CHANGED

## 2020-08-31 ASSESSMENT — PAIN DESCRIPTION - PAIN TYPE: TYPE: CHRONIC PAIN

## 2020-08-31 ASSESSMENT — PAIN DESCRIPTION - ORIENTATION: ORIENTATION: MID

## 2020-08-31 ASSESSMENT — PAIN - FUNCTIONAL ASSESSMENT: PAIN_FUNCTIONAL_ASSESSMENT: PREVENTS OR INTERFERES SOME ACTIVE ACTIVITIES AND ADLS

## 2020-08-31 ASSESSMENT — PAIN DESCRIPTION - LOCATION: LOCATION: BACK

## 2020-08-31 ASSESSMENT — PAIN DESCRIPTION - DESCRIPTORS: DESCRIPTORS: SHARP

## 2020-08-31 ASSESSMENT — PAIN SCALES - GENERAL: PAINLEVEL_OUTOF10: 9

## 2020-08-31 NOTE — PROGRESS NOTES
1227 [facility] [place]  Progress Note and Procedure Note      Selam Goldman  AGE: 76 y.o. GENDER: male  : 1945  EPISODE DATE:  2020      Subjective:     Chief Complaint   Patient presents with    Wound Check     right hand         HISTORY of PRESENT ILLNESS HPI     Selam Goldman is a 76 y.o. male who presents today for wound evaluation. History of Wound Context: This patient was referred from the emergency room and having been seen there on 2020 having fallen at home and sustaining multiple skin tears to the dorsum of his right hand and wrist.  He was treated with wound cleanser and Polysporin ointment.  The patient suffers from Parkinson's disease.  He denies any present pain or drainage from the wounds. The patient was able to keep a collagen occlusive dressing in place for the entire week  Wound Pain Timing/Severity: none  Quality of pain: N/A  Severity:  0 / 10   Modifying Factors: None  Associated Signs/Symptoms: none    Wound Identification:  Wound Type: traumatic  Contributing Factors: none        PAST MEDICAL HISTORY        Diagnosis Date    Anxiety     PCP in [place]:Dr. Ciara Kumar.    Arthritis     Arthritis of hand, right 2012    Sneed esophagus     Dr. Rocio Flores. EGD:2012. Next EGD due in 3yrs=2015    Sneed esophagus     Under care of GI    Bilateral carotid artery stenosis <50% 2014    BPH (benign prostatic hypertrophy)     Dr. Amaury Clements. PSA per urology.     CAD (coronary artery disease)     under care of cards:Dr. Pasha Barnett    Carotid stenosis     Chest pain     pt states he no chest pain in 5 yrs, panic attack    Chronic back pain 2012    Chronic back pain     under care of ortho spine:Dr. Gurvinder Fulton    Constipation 2013    Degenerative arthritis of thoracic spine 2012    Degenerative cervical disc 2012    Depression with anxiety 11/15/2011    Klonopin per neurologist(Dr. Tyra Albarado)    Diverticulosis 2012 Wound Measurements:  Incision 07/30/13 Knee Left (Active)   Number of days: 2588          Plan:     Treatment Note please see attached Discharge Instructions    New Medication(s) at this visit:   New Prescriptions    No medications on file       Other orders at this visit:   Orders Placed This Encounter   Procedures    Supply: Wound Cleanser       Written patient dismissal instructions given to patient and signed by patient or POA. Discharge Instructions       [address] Physician Orders   [address] [employer] [address]   [address] Telephone: 21  [phone]    NAME:  Arvin Mom OF BIRTH:  1945  MEDICAL RECORD NUMBER:  [id number]  DATE:  8/31/2020    Congratulations!! You have completed your treatment. 1. Return to your Primary Care Physician for all your health issues. 2. Resume your ordinary activities as tolerated. 3. Take your medications as prescribed by your primary care physician. 4. Check your skin daily for cracks, bruises, sores, or dryness. Use a moisturizer as needed. 5. Clean and dry your skin, using mild soap and warm water (not hot). 6. Avoid alcohol and caffeine and do not smoke. 7. Maintain a nutritious diet. Ask your primary care doctor about adding a multivitamin to your medication regimen. 8. Avoid pressure on your healed wound site. THANK YOU FOR ALLOWING US TO SERVE YOU.  PLEASE CALL IF YOU DEVELOP ANOTHER WOUND. [phone])    [] Patient unable to sign Discharge Instructions given to ECF/Transportation/POA    Electronically signed by Gagan Lee RN on 8/31/2020 at 10:48 AM            Electronically signed by Michael Andrews MD on 8/31/2020 at 11:48 AM

## 2020-09-04 ENCOUNTER — TELEPHONE (OUTPATIENT)
Dept: FAMILY MEDICINE CLINIC | Age: 75
End: 2020-09-04

## 2020-09-04 NOTE — TELEPHONE ENCOUNTER
----- Message from Omer Benavides sent at 9/4/2020  2:33 PM EDT -----  Subject: Message to Provider    QUESTIONS  Information for Provider? Mike Hanson is going to [employer]. on [address] [address] for the ordered labs. Please send order over to outside lab.  ---------------------------------------------------------------------------  --------------  [address]  What is the best way for the office to contact you? OK to leave message on   voicemail  Preferred Call Back Phone Number? [phone]  ---------------------------------------------------------------------------  --------------  SCRIPT ANSWERS  Relationship to Patient? Other  Representative Name? James Nixon   Is the Representative on the appropriate HIPAA document in Epic?  Yes

## 2020-11-05 ENCOUNTER — TELEPHONE (OUTPATIENT)
Dept: FAMILY MEDICINE CLINIC | Age: 75
End: 2020-11-05

## 2020-11-05 NOTE — TELEPHONE ENCOUNTER
----- Message from Boogie Hans sent at 11/4/2020  4:38 PM EST -----  Subject: Appointment Request    Reason for Call: Routine Existing Condition Follow Up    QUESTIONS  Type of Appointment? Established Patient  Reason for appointment request? No appointments available during search  Additional Information for Provider? Would like a checkup   hasn't seen the provider in a while. Leaving town on Nov 16th and would   like to see doctor before. Did not want a virtual appt.  ---------------------------------------------------------------------------  --------------  CALL BACK INFO  What is the best way for the office to contact you? OK to leave message on   voicemail  Preferred Call Back Phone Number? [phone]  ---------------------------------------------------------------------------  --------------  SCRIPT ANSWERS  Relationship to Patient? Other  Representative Name? Si Sandifer  Additional information verified (besides Name and Date of Birth)? Address  Appointment reason? Well Care/Follow Ups  Select a Well Care/Follow Ups appointment reason? Adult Existing Condition   Follow Up [Diabetes   CHF   COPD   Hypertension/Blood Pressure Check]  (Is the patient requesting to be seen urgently for their symptoms?)? No  Is this follow up request related to routine Diabetes Management? No  Are you having any new concerns about your existing condition? No  Have you been diagnosed with   tested for   or told that you are suspected of having COVID-19 (Coronavirus)? No  Have you had a fever or taken medication to treat a fever within the past   3 days? No  Have you had a cough   shortness of breath or flu-like symptoms within the past 3 days? No  Do you currently have flu-like symptoms including fever or chills   cough   shortness of breath   or difficulty breathing   or new loss of taste or smell? No  (Service Expert  click yes below to proceed with [facility] As Usual   Scheduling)?  Yes

## 2020-11-05 NOTE — TELEPHONE ENCOUNTER
----- Message from Geraldo Ancelmo sent at 11/4/2020  4:30 PM EST -----  Subject: Medication Problem    QUESTIONS  Name of Medication? imipramine (TOFRANIL) 25 MG tablet  Patient-reported dosage and instructions? 1x daily 25mg  What question or problem do you have with the medication? patient has been   waiting on a refill request that she ordered on 10/29/2020  [place] [facility] [facility] [phone] [facility] Number [phone]  Pharmacy phone number (if available)? [phone]  Additional Information for Provider?   ---------------------------------------------------------------------------  --------------  [address]  What is the best way for the office to contact you? OK to leave message on   voicemail  Preferred Call Back Phone Number? [phone]  ---------------------------------------------------------------------------  --------------  SCRIPT ANSWERS  Relationship to Patient? Other  Representative Name? wife   Is the Representative on the appropriate HIPAA document in Epic?  Yes

## 2020-11-10 ENCOUNTER — OFFICE VISIT (OUTPATIENT)
Dept: CARDIOLOGY CLINIC | Age: 75
End: 2020-11-10
Payer: MEDICARE

## 2020-11-10 VITALS
HEART RATE: 68 BPM | DIASTOLIC BLOOD PRESSURE: 80 MMHG | WEIGHT: 164 LBS | SYSTOLIC BLOOD PRESSURE: 120 MMHG | BODY MASS INDEX: 24.94 KG/M2 | TEMPERATURE: 98.4 F

## 2020-11-10 PROCEDURE — 3017F COLORECTAL CA SCREEN DOC REV: CPT | Performed by: INTERNAL MEDICINE

## 2020-11-10 PROCEDURE — G8427 DOCREV CUR MEDS BY ELIG CLIN: HCPCS | Performed by: INTERNAL MEDICINE

## 2020-11-10 PROCEDURE — G8484 FLU IMMUNIZE NO ADMIN: HCPCS | Performed by: INTERNAL MEDICINE

## 2020-11-10 PROCEDURE — 4040F PNEUMOC VAC/ADMIN/RCVD: CPT | Performed by: INTERNAL MEDICINE

## 2020-11-10 PROCEDURE — 1036F TOBACCO NON-USER: CPT | Performed by: INTERNAL MEDICINE

## 2020-11-10 PROCEDURE — G8420 CALC BMI NORM PARAMETERS: HCPCS | Performed by: INTERNAL MEDICINE

## 2020-11-10 PROCEDURE — 1123F ACP DISCUSS/DSCN MKR DOCD: CPT | Performed by: INTERNAL MEDICINE

## 2020-11-10 PROCEDURE — 99214 OFFICE O/P EST MOD 30 MIN: CPT | Performed by: INTERNAL MEDICINE

## 2020-11-10 RX ORDER — FESOTERODINE FUMARATE 8 MG/1
TABLET, FILM COATED, EXTENDED RELEASE ORAL
COMMUNITY
End: 2021-05-18 | Stop reason: ALTCHOICE

## 2020-11-10 RX ORDER — GABAPENTIN 300 MG/1
300 CAPSULE ORAL 3 TIMES DAILY
COMMUNITY
End: 2021-05-18 | Stop reason: ALTCHOICE

## 2020-11-10 NOTE — PROGRESS NOTES
Subjective:      Patient ID: Rai Rivera is a 76 y.o. male. [facility] month follow up CAD/angina/HTN. Reasonably active. Walking almost daily. No exertional sx. Bothered by [employer]. Some PINA. No chest pain. No tachycardia. No syncope. No pnd or orthopnea. No edema. Wt stable. BP good . Past Medical History:   Diagnosis Date    Anxiety     PCP in [place]:Dr. Chary Martínez.    Arthritis     Arthritis of hand, right 6/20/2012    Sneed esophagus     Dr. Duggan Solid. EGD:1/17/2012. Next EGD due in 3yrs=1/2015    Sneed esophagus     Under care of GI    Bilateral carotid artery stenosis <50% 4/30/2014    BPH (benign prostatic hypertrophy)     Dr. Paty Bautista. PSA per urology.     CAD (coronary artery disease)     under care of cards:Dr. Royal Cosby    Carotid stenosis     Chest pain     pt states he no chest pain in 5 yrs, panic attack    Chronic back pain 1/26/2012    Chronic back pain     under care of ortho spine:Dr. Sweeney    Constipation 8/6/2013    Degenerative arthritis of thoracic spine 1/26/2012    Degenerative cervical disc 1/26/2012    Depression with anxiety 11/15/2011    Klonopin per neurologist(Dr. Ange Dockery)    Diverticulosis 1/17/2012    colonoscopy    Elevated PSA     8/21/14;5/2013:under care of Dr. Beltran/[place]:Urology group    Erectile dysfunction 6/13/2017    Essential hypertension, benign 6/20/2012    cardiologist:Dr. Pranav Gar    GERD (gastroesophageal reflux disease)     Hemorrhoid     Hiatal hernia     small    Hyperlipidemia     Impaired fasting glucose 5/19/2014    Osteopenia per CXR 5/19/2014    Parkinson disease ([facility] [address]     Dr. Richy Connell Neurology    Renal stone 4/2012    4 mm distal left ureteral calculus:Dr. Beltran:urologist    RLS (restless legs syndrome)     S/P colonoscopy 2011;5/20/19    Dr. Mandie Nam per pt' next is in 10yrs-2021. 5/20/19(Dr. Tye Scott)    S/P endoscopy 1/2012    Dr. Bard Henderson Not on file     Attends Jainism service: Not on file     Active member of club or organization: Not on file     Attends meetings of clubs or organizations: Not on file     Relationship status: Not on file    Intimate partner violence     Fear of current or ex partner: Not on file     Emotionally abused: Not on file     Physically abused: Not on file     Forced sexual activity: Not on file   Other Topics Concern    Not on file   Social History Narrative    Not on file              Current Outpatient Medications   Medication Sig Dispense Refill    Fesoterodine Fumarate ER (TOVIAZ) 8 MG TB24 Take by mouth      gabapentin (NEURONTIN) 300 MG capsule Take 300 mg by mouth 3 times daily.  imipramine (TOFRANIL) 25 MG tablet TAKE 1 TABLET NIGHTLY 90 tablet 0    donepezil (ARICEPT) 5 MG tablet Take 5 mg by mouth nightly      simvastatin (ZOCOR) 10 MG tablet TAKE 1 TABLET NIGHTLY 90 tablet 4    tadalafil (CIALIS) 10 MG tablet Take 10 mg by mouth as needed for Erectile Dysfunction      diclofenac sodium 1 % GEL Apply 2 g topically 2 times daily      linaclotide (LINZESS) 290 MCG CAPS capsule Take 290 mcg by mouth every morning (before breakfast)      clonazePAM (KLONOPIN) 0.5 MG tablet Take 1 mg by mouth nightly       carbidopa-levodopa (SINEMET)  MG per tablet Take by mouth 4 times daily Take 3 tablets at 0800, 1300 1700 and one tablet at 2300      Cholecalciferol (VITAMIN D3) 2000 UNITS CAPS   Take 1 capsule by mouth daily       aspirin 81 MG EC tablet   Take 81 mg by mouth three times a week On Mondays, Wednesdays and Fridays      Polyethylene Glycol 3350 (MIRALAX PO)   Take 17 g by mouth daily as needed       pantoprazole (PROTONIX) 40 MG tablet   Take 40 mg by mouth daily        No current facility-administered medications for this visit.        Vitals:    11/10/20 1047   BP: 120/80   Pulse: 68   Temp: 98.4 °F (36.9 °C)       Wt 164    Review of Systems   Constitutional: Negative for activity change. Some  fatigue. Respiratory: Negative for apnea, cough, choking, Has chest tightness and shortness of breath. Cardiovascular: Negative for chest pain, palpitations and leg swelling. No PND or orthopnea. No tachycardia. Gastrointestinal: Negative for abdominal distention. Musculoskeletal: Negative for myalgias. Neurological: Negative for light-headedness. Negative for dizziness and syncope. Psychiatric/Behavioral: Negative for behavioral problems, confusion and agitation. All other systems reviewed negative as done. Objective:   Physical Exam   Constitutional: He is oriented to person, place, and time. He appears well-developed and well-nourished. No distress. HENT:   Head: Normocephalic and atraumatic. Eyes: Conjunctivae and EOM are normal. Right eye exhibits no discharge. Left eye exhibits no discharge. Neck: Normal range of motion. Neck supple. No JVD present. Cardiovascular: Normal rate, regular rhythm, S1 normal, S2 normal and normal heart sounds. Exam reveals no gallop. No murmur heard. Pulses:       Radial pulses are 2+ on the right side, and 2+ on the left side. Pulmonary/Chest: Effort normal and breath sounds normal. No respiratory distress. He has no wheezes. He has no rales. Abdominal: Soft. Bowel sounds are normal. There is no tenderness. Musculoskeletal: Normal range of motion. He exhibits no edema. Neurological: He is alert and oriented to person, place, and time. Skin: Skin is warm and dry. Psychiatric: He has a normal mood and affect. His behavior is normal. Thought content normal.       Assessment:       Diagnosis Orders   1. CAD in native artery     2. Angina pectoris ([address]     3. Essential hypertension             Plan:      CV stable. Bothered by progressive parkinsons. BP good. No exertional sx. No angina. Lipids per PCP. Reviewed previous records and testing including cath 8/15. Follow up 6 months.

## 2021-02-08 ENCOUNTER — TELEPHONE (OUTPATIENT)
Dept: FAMILY MEDICINE CLINIC | Age: 76
End: 2021-02-08

## 2021-02-08 DIAGNOSIS — F33.0 MILD RECURRENT MAJOR DEPRESSION (HCC): ICD-10-CM

## 2021-02-08 DIAGNOSIS — F41.9 ANXIETY: ICD-10-CM

## 2021-02-08 RX ORDER — IMIPRAMINE HCL 25 MG
TABLET ORAL
Qty: 90 TABLET | Refills: 0 | Status: SHIPPED | OUTPATIENT
Start: 2021-02-08 | End: 2021-02-09 | Stop reason: SDUPTHER

## 2021-02-08 NOTE — TELEPHONE ENCOUNTER
30 day Refill, completely out    imipramine (TOFRANIL) 25 MG tablet    Pharmacy: [address] [facility] ,  [place], [id number]  Ph: [phone]    Please advise.      OV: 11/10/2020

## 2021-02-09 ENCOUNTER — TELEPHONE (OUTPATIENT)
Dept: FAMILY MEDICINE CLINIC | Age: 76
End: 2021-02-09

## 2021-02-09 DIAGNOSIS — F41.9 ANXIETY: ICD-10-CM

## 2021-02-09 DIAGNOSIS — F33.0 MILD RECURRENT MAJOR DEPRESSION (HCC): ICD-10-CM

## 2021-02-09 RX ORDER — IMIPRAMINE HCL 25 MG
TABLET ORAL
Qty: 90 TABLET | Refills: 0 | Status: SHIPPED | OUTPATIENT
Start: 2021-02-09 | End: 2021-05-17 | Stop reason: SDUPTHER

## 2021-02-09 NOTE — TELEPHONE ENCOUNTER
Medication was filled, but with the [address]      Pharmacy information is listed below;    30 day Refill, completely out     imipramine (TOFRANIL) 25 MG tablet     Pharmacy: [facility]  [address] [place], [address]  Ph: [phone]     Please advise.      OV: 11/10/2020

## 2021-03-16 RX ORDER — SIMVASTATIN 10 MG
10 TABLET ORAL NIGHTLY
Qty: 90 TABLET | Refills: 3 | Status: SHIPPED | OUTPATIENT
Start: 2021-03-16 | End: 2022-03-07

## 2021-03-16 NOTE — TELEPHONE ENCOUNTER
Pt called completely out of medication.  Script sent to [facility] Medication Refills:    Medication: Simvastatin     Dosage: 10 mg     Number: 90    Refills: 0    Last Office Visit: 11/10/2020    Next Office Visit: 05/17/2021    Last Refill: 02/07/2020

## 2021-05-17 ENCOUNTER — TELEPHONE (OUTPATIENT)
Dept: FAMILY MEDICINE CLINIC | Age: 76
End: 2021-05-17

## 2021-05-17 DIAGNOSIS — F41.9 ANXIETY: ICD-10-CM

## 2021-05-17 DIAGNOSIS — F33.0 MILD RECURRENT MAJOR DEPRESSION (HCC): ICD-10-CM

## 2021-05-17 RX ORDER — IMIPRAMINE HCL 25 MG
TABLET ORAL
Qty: 90 TABLET | Refills: 0 | Status: SHIPPED | OUTPATIENT
Start: 2021-05-17 | End: 2021-05-17 | Stop reason: SDUPTHER

## 2021-05-17 NOTE — TELEPHONE ENCOUNTER
Pt's wife informed and pharmacy verified. Pt is completely out of medication and would like a 30 day supply sent to [facility] on file. Pharmacy has been updated.

## 2021-05-17 NOTE — TELEPHONE ENCOUNTER
rx transmitted electronically to the pharmacy via [employer]   Let patient know and verify pharmacy

## 2021-05-17 NOTE — TELEPHONE ENCOUNTER
Pt [facility] and a 30 day supply sent to [address] [place] [address]:  7/28/2020

## 2021-05-17 NOTE — TELEPHONE ENCOUNTER
Refill request    imipramine (TOFRANIL) 25 MG tablet    [address] [facility] [phone] - F [phone]   [address] [facility], [address] [place] [address]   Phone:  [phone]  Fax:  [phone]    Patient is completely out and needs mediction asap, please send to Kathleen:    [place], [place] - [address] [place] [address]   Phone:  [phone]  Fax:  [phone]    Please advise.

## 2021-05-18 ENCOUNTER — HOSPITAL ENCOUNTER (EMERGENCY)
Age: 76
Discharge: HOME | End: 2021-05-18
Attending: EMERGENCY MEDICINE
Payer: MEDICARE

## 2021-05-18 ENCOUNTER — APPOINTMENT (OUTPATIENT)
Dept: CT IMAGING | Age: 76
End: 2021-05-18
Payer: MEDICARE

## 2021-05-18 VITALS
HEART RATE: 72 BPM | TEMPERATURE: 97.8 F | SYSTOLIC BLOOD PRESSURE: 102 MMHG | HEIGHT: 68 IN | BODY MASS INDEX: 25.01 KG/M2 | WEIGHT: 165 LBS | RESPIRATION RATE: 18 BRPM | DIASTOLIC BLOOD PRESSURE: 53 MMHG | OXYGEN SATURATION: 100 %

## 2021-05-18 DIAGNOSIS — K57.92 ACUTE DIVERTICULITIS: Primary | ICD-10-CM

## 2021-05-18 LAB
ALBUMIN SERPL-MCNC: 4.6 G/DL (ref 3.4–5)
ALP BLD-CCNC: 90 U/L (ref 40–129)
ALT SERPL-CCNC: <5 U/L (ref 10–40)
ANION GAP SERPL CALCULATED.3IONS-SCNC: 10 MMOL/L (ref 3–16)
APTT: 33.5 SEC (ref 24.2–36.2)
AST SERPL-CCNC: 17 U/L (ref 15–37)
BACTERIA: ABNORMAL /HPF
BASOPHILS ABSOLUTE: 0 K/UL (ref 0–0.2)
BASOPHILS RELATIVE PERCENT: 0.1 %
BILIRUB SERPL-MCNC: 0.5 MG/DL (ref 0–1)
BILIRUBIN DIRECT: <0.2 MG/DL (ref 0–0.3)
BILIRUBIN URINE: NEGATIVE
BILIRUBIN, INDIRECT: ABNORMAL MG/DL (ref 0–1)
BLOOD, URINE: ABNORMAL
BUN BLDV-MCNC: 16 MG/DL (ref 7–20)
CALCIUM SERPL-MCNC: 9.5 MG/DL (ref 8.3–10.6)
CHLORIDE BLD-SCNC: 100 MMOL/L (ref 99–110)
CHP ED QC CHECK: YES
CLARITY: CLEAR
CO2: 27 MMOL/L (ref 21–32)
COLOR: YELLOW
CREAT SERPL-MCNC: 0.9 MG/DL (ref 0.8–1.3)
EOSINOPHILS ABSOLUTE: 0.1 K/UL (ref 0–0.6)
EOSINOPHILS RELATIVE PERCENT: 0.7 %
GFR AFRICAN AMERICAN: >60
GFR NON-AFRICAN AMERICAN: >60
GLUCOSE BLD-MCNC: 134 MG/DL (ref 70–99)
GLUCOSE URINE: NEGATIVE MG/DL
HCT VFR BLD CALC: 39.6 % (ref 40.5–52.5)
HEMOCCULT STL QL: NORMAL
HEMOGLOBIN: 13.2 G/DL (ref 13.5–17.5)
INR BLD: 1.06 (ref 0.86–1.14)
KETONES, URINE: ABNORMAL MG/DL
LEUKOCYTE ESTERASE, URINE: NEGATIVE
LIPASE: 53 U/L (ref 13–60)
LYMPHOCYTES ABSOLUTE: 1.6 K/UL (ref 1–5.1)
LYMPHOCYTES RELATIVE PERCENT: 12.5 %
MCH RBC QN AUTO: 31.5 PG (ref 26–34)
MCHC RBC AUTO-ENTMCNC: 33.4 G/DL (ref 31–36)
MCV RBC AUTO: 94.4 FL (ref 80–100)
MICROSCOPIC EXAMINATION: YES
MONOCYTES ABSOLUTE: 1.3 K/UL (ref 0–1.3)
MONOCYTES RELATIVE PERCENT: 10.2 %
NEUTROPHILS ABSOLUTE: 10.1 K/UL (ref 1.7–7.7)
NEUTROPHILS RELATIVE PERCENT: 76.5 %
NITRITE, URINE: NEGATIVE
PDW BLD-RTO: 14.2 % (ref 12.4–15.4)
PH UA: 6 (ref 5–8)
PLATELET # BLD: 197 K/UL (ref 135–450)
PMV BLD AUTO: 9.1 FL (ref 5–10.5)
POC OCCULT BLOOD STOOL: NEGATIVE
POTASSIUM SERPL-SCNC: 3.9 MMOL/L (ref 3.5–5.1)
PROTEIN UA: NEGATIVE MG/DL
PROTHROMBIN TIME: 12.3 SEC (ref 10–13.2)
RBC # BLD: 4.2 M/UL (ref 4.2–5.9)
RBC UA: ABNORMAL /HPF (ref 0–4)
SODIUM BLD-SCNC: 137 MMOL/L (ref 136–145)
SPECIFIC GRAVITY UA: 1.01 (ref 1–1.03)
TOTAL PROTEIN: 7.4 G/DL (ref 6.4–8.2)
URINE TYPE: ABNORMAL
UROBILINOGEN, URINE: 0.2 E.U./DL
WBC # BLD: 13.2 K/UL (ref 4–11)
WBC UA: ABNORMAL /HPF (ref 0–5)

## 2021-05-18 PROCEDURE — 99284 EMERGENCY DEPT VISIT MOD MDM: CPT

## 2021-05-18 PROCEDURE — 85610 PROTHROMBIN TIME: CPT

## 2021-05-18 PROCEDURE — 74177 CT ABD & PELVIS W/CONTRAST: CPT

## 2021-05-18 PROCEDURE — 81001 URINALYSIS AUTO W/SCOPE: CPT

## 2021-05-18 PROCEDURE — 51798 US URINE CAPACITY MEASURE: CPT

## 2021-05-18 PROCEDURE — 80048 BASIC METABOLIC PNL TOTAL CA: CPT

## 2021-05-18 PROCEDURE — 85025 COMPLETE CBC W/AUTO DIFF WBC: CPT

## 2021-05-18 PROCEDURE — 85730 THROMBOPLASTIN TIME PARTIAL: CPT

## 2021-05-18 PROCEDURE — 6360000002 HC RX W HCPCS: Performed by: PHYSICIAN ASSISTANT

## 2021-05-18 PROCEDURE — 96365 THER/PROPH/DIAG IV INF INIT: CPT

## 2021-05-18 PROCEDURE — 6370000000 HC RX 637 (ALT 250 FOR IP): Performed by: PHYSICIAN ASSISTANT

## 2021-05-18 PROCEDURE — 82272 OCCULT BLD FECES 1-3 TESTS: CPT

## 2021-05-18 PROCEDURE — 6360000004 HC RX CONTRAST MEDICATION: Performed by: PHYSICIAN ASSISTANT

## 2021-05-18 PROCEDURE — 83690 ASSAY OF LIPASE: CPT

## 2021-05-18 PROCEDURE — 80076 HEPATIC FUNCTION PANEL: CPT

## 2021-05-18 PROCEDURE — 2580000003 HC RX 258: Performed by: PHYSICIAN ASSISTANT

## 2021-05-18 RX ORDER — HYDROCODONE BITARTRATE AND ACETAMINOPHEN 5; 325 MG/1; MG/1
1 TABLET ORAL ONCE
Status: COMPLETED | OUTPATIENT
Start: 2021-05-18 | End: 2021-05-18

## 2021-05-18 RX ORDER — CARBIDOPA AND LEVODOPA 25; 100 MG/1; MG/1
2 TABLET, EXTENDED RELEASE ORAL NIGHTLY
Status: ON HOLD | COMMUNITY
Start: 2021-05-05 | End: 2021-08-13 | Stop reason: CLARIF

## 2021-05-18 RX ORDER — 0.9 % SODIUM CHLORIDE 0.9 %
500 INTRAVENOUS SOLUTION INTRAVENOUS ONCE
Status: COMPLETED | OUTPATIENT
Start: 2021-05-18 | End: 2021-05-18

## 2021-05-18 RX ORDER — HYDROCODONE BITARTRATE AND ACETAMINOPHEN 5; 325 MG/1; MG/1
1 TABLET ORAL EVERY 8 HOURS PRN
Qty: 6 TABLET | Refills: 0 | Status: SHIPPED | OUTPATIENT
Start: 2021-05-18 | End: 2021-05-20

## 2021-05-18 RX ORDER — DOCUSATE SODIUM 100 MG/1
100 CAPSULE, LIQUID FILLED ORAL 2 TIMES DAILY
Qty: 10 CAPSULE | Refills: 0 | Status: SHIPPED | OUTPATIENT
Start: 2021-05-18 | End: 2021-05-23

## 2021-05-18 RX ORDER — IMIPRAMINE HCL 25 MG
TABLET ORAL
Qty: 30 TABLET | Refills: 0 | Status: SHIPPED | OUTPATIENT
Start: 2021-05-18 | End: 2021-07-02 | Stop reason: SDUPTHER

## 2021-05-18 RX ORDER — MODAFINIL 100 MG/1
50-100 TABLET ORAL EVERY MORNING
COMMUNITY
Start: 2021-04-02 | End: 2022-09-08

## 2021-05-18 RX ORDER — AMOXICILLIN AND CLAVULANATE POTASSIUM 875; 125 MG/1; MG/1
1 TABLET, FILM COATED ORAL 2 TIMES DAILY
Qty: 20 TABLET | Refills: 0 | Status: SHIPPED | OUTPATIENT
Start: 2021-05-18 | End: 2021-05-28

## 2021-05-18 RX ADMIN — IOPAMIDOL 80 ML: 755 INJECTION, SOLUTION INTRAVENOUS at 14:06

## 2021-05-18 RX ADMIN — PIPERACILLIN AND TAZOBACTAM 3375 MG: 3; .375 INJECTION, POWDER, LYOPHILIZED, FOR SOLUTION INTRAVENOUS at 15:44

## 2021-05-18 RX ADMIN — SODIUM CHLORIDE 500 ML: 9 INJECTION, SOLUTION INTRAVENOUS at 15:43

## 2021-05-18 RX ADMIN — HYDROCODONE BITARTRATE AND ACETAMINOPHEN 1 TABLET: 5; 325 TABLET ORAL at 16:45

## 2021-05-18 ASSESSMENT — ENCOUNTER SYMPTOMS
SORE THROAT: 0
VOMITING: 0
SHORTNESS OF BREATH: 0
RHINORRHEA: 0
BLOOD IN STOOL: 1
NAUSEA: 0
CONSTIPATION: 1
COLOR CHANGE: 0
COUGH: 0

## 2021-05-18 ASSESSMENT — PAIN DESCRIPTION - ORIENTATION: ORIENTATION: LOWER

## 2021-05-18 ASSESSMENT — PAIN DESCRIPTION - PAIN TYPE: TYPE: ACUTE PAIN

## 2021-05-18 ASSESSMENT — PAIN DESCRIPTION - DESCRIPTORS: DESCRIPTORS: PRESSURE

## 2021-05-18 ASSESSMENT — PAIN SCALES - GENERAL: PAINLEVEL_OUTOF10: 5

## 2021-05-18 ASSESSMENT — PAIN DESCRIPTION - LOCATION: LOCATION: ABDOMEN

## 2021-05-18 ASSESSMENT — PAIN DESCRIPTION - FREQUENCY: FREQUENCY: CONTINUOUS

## 2021-05-18 NOTE — ED PROVIDER NOTES
[address] ENCOUNTER          PHYSICIAN ASSISTANT NOTE       Date of evaluation: 5/18/2021    Chief Complaint     Abdominal Pain (Pt reports abd pain since yesterday. Denies n/v), Rectal Pain, and Rectal Bleeding      History of Present Illness     Monica Dale is a 68 y.o. male, with a history of GERD, hypertension, coronary artery disease, hyperlipidemia, Parkinson's disease and chronic constipation for which he is followed by Dr. Annabelle Snyder. He presents to the ED with complaints of intermittent pressure-like pain in the suprapubic region that started 2 days ago and has progressively worsened with no apparent triggers. States he had a normal bowel movement yesterday but a small bowel movement today associated with bright red blood per rectum. He describes urinary frequency and hesitancy at baseline for which she is followed by Dr. Cm Betts but this seemed to be more significant last night. He has had no fever or chills. His pain does not radiate. He rates it a 5 out of 10 in severity. He otherwise has no complaints. Review of Systems     Review of Systems   Constitutional: Negative for chills and fever. HENT: Negative for congestion, rhinorrhea and sore throat. Respiratory: Negative for cough and shortness of breath. Cardiovascular: Negative for chest pain and palpitations. Gastrointestinal: Positive for blood in stool and constipation. Negative for nausea and vomiting. Genitourinary: Positive for frequency. Negative for flank pain, hematuria and urgency. Musculoskeletal: Negative for arthralgias and myalgias. Skin: Negative for color change and rash. Neurological: Negative for dizziness, light-headedness and headaches. All other systems reviewed and are negative.       Past Medical, Surgical, Family, and Social History     He has a past medical history of Anxiety, Arthritis, Arthritis of hand, right, Sneed esophagus, Sneed esophagus, Bilateral carotid artery stenosis <50%, BPH (benign prostatic hypertrophy), CAD (coronary artery disease), Carotid stenosis, Chest pain, Chronic back pain, Chronic back pain, Constipation, Degenerative arthritis of thoracic spine, Degenerative cervical disc, Depression with anxiety, Diverticulosis, Elevated PSA, Erectile dysfunction, Essential hypertension, benign, GERD (gastroesophageal reflux disease), Hemorrhoid, Hiatal hernia, Hyperlipidemia, Impaired fasting glucose, Osteopenia per CXR, Parkinson disease ([address] Renal stone, RLS (restless legs syndrome), S/P colonoscopy, S/P endoscopy, Sigmoidoscopy exam, Therapeutic drug monitoring, Thoracic spondylosis, Venous insufficiency of leg, and Vocal cord paresis. He has a past surgical history that includes Cataract removal; Colonoscopy (4529;2214); Upper gastrointestinal endoscopy (5/07); Knee cartilage surgery; Lithotripsy; osteotomy (Left); Knee Arthroplasty (Left, 7/30/13); TURP (N/A, [id number]); and joint replacement (Right, 02/23/2017). His family history includes Alcohol Abuse in his father; Cancer (age of onset: 68) in his sister; Coronary Art Dis (age of onset: 77) in his brother; Coronary Art Dis (age of onset: 80) in his mother; Heart Disease in his sister; Kidney Disease (age of onset: 61) in his father. He reports that he has never smoked. He has never used smokeless tobacco. He reports current alcohol use. He reports that he does not use drugs. Medications     Previous Medications    ASPIRIN 81 MG EC TABLET      Take 81 mg by mouth three times a week On Mondays, Wednesdays and Fridays    CARBIDOPA-LEVODOPA (SINEMET CR)  MG PER EXTENDED RELEASE TABLET    Take 1 tablet by mouth nightly 2300    CARBIDOPA-LEVODOPA (SINEMET)  MG PER TABLET    Take 3 tablets by mouth 4 times daily Take 3 tablets at 0800, 1200, 1600 and 2000.     CHOLECALCIFEROL (VITAMIN D3) 2000 UNITS CAPS      Take 1 capsule by mouth daily     CLONAZEPAM (KLONOPIN) 0.5 MG TABLET    Take 1 mg by mouth nightly     DONEPEZIL (ARICEPT) 5 MG TABLET    Take 5 mg by mouth nightly    IMIPRAMINE (TOFRANIL) 25 MG TABLET    TAKE 1 TABLET NIGHTLY    LINACLOTIDE (LINZESS) 290 MCG CAPS CAPSULE    Take 290 mcg by mouth every morning (before breakfast)    MODAFINIL (PROVIGIL) 100 MG TABLET    Take  mg by mouth every morning. PANTOPRAZOLE (PROTONIX) 40 MG TABLET      Take 40 mg by mouth daily     POLYETHYLENE GLYCOL 3350 (MIRALAX PO)      Take 17 g by mouth daily as needed     SIMVASTATIN (ZOCOR) 10 MG TABLET    Take 1 tablet by mouth nightly       Allergies     He is allergic to levofloxacin. Physical Exam     INITIAL VITALS: BP: (!) 183/90, Temp: 97.8 °F (36.6 °C), Pulse: 70, Resp: 18, SpO2: 100 %  Physical Exam  Vitals reviewed. Constitutional:       General: He is not in acute distress. Appearance: He is well-developed and normal weight. HENT:      Head: Normocephalic and atraumatic. Mouth/Throat:      Mouth: Mucous membranes are moist.   Eyes:      Extraocular Movements: Extraocular movements intact. Conjunctiva/sclera: Conjunctivae normal.   Neck:      Trachea: Phonation normal.   Cardiovascular:      Rate and Rhythm: Normal rate and regular rhythm. Heart sounds: No murmur heard. No friction rub. No gallop. Pulmonary:      Effort: Pulmonary effort is normal. No respiratory distress. Breath sounds: No wheezing, rhonchi or rales. Abdominal:      General: There is no distension. Palpations: Abdomen is soft. Tenderness: There is abdominal tenderness in the suprapubic area. There is no guarding or rebound. Genitourinary:     Prostate: Enlarged. Rectum: Guaiac result negative. Musculoskeletal:      Cervical back: Normal range of motion and neck supple. Skin:     General: Skin is warm and dry. Findings: No petechiae or rash. Neurological:      Mental Status: He is alert and oriented to person, place, and time.    Psychiatric:         Mood and Affect: Bladder scan showed no evidence of urinary retention. CT of the abdomen and pelvis with IV contrast shows evidence of acute diverticulitis of the sigmoid colon. The patient was ordered IV fluids, Zosyn and 1 Norco for evidence of acute diverticulitis. He is prescribed Augmentin to go home with as well as Norco to take sparingly as needed for severe pain and a stool softener to prevent constipation. He is encouraged to increase his fluid intake as well. He will follow-up with Dr. Kathy Harvey for recheck as there was mucosal thickening and luminal narrowing associated with mucosal edema and distortion that could indicate mural lesion or tumor. Return to the ED for any worsening symptoms. This patient was also evaluated by the attending physician. All care plans were discussed and agreed upon. Clinical Impression     1. Acute diverticulitis        Disposition     PATIENT REFERRED TO:  Aparna Mckinney MD  [address]  [address] [facility] [address]    Call   As needed    Rachel Kowalski MD  [address] [facility] [address]  [phone]    Schedule an appointment as soon as possible for a visit   for recheck of diverticulitis and additional management if necessary      DISCHARGE MEDICATIONS:  Discharge Medication List as of 5/18/2021  4:47 PM      START taking these medications    Details   amoxicillin-clavulanate (AUGMENTIN) 875-125 MG per tablet Take 1 tablet by mouth 2 times daily for 10 days, Disp-20 tablet, R-0Print      HYDROcodone-acetaminophen (NORCO) 5-325 MG per tablet Take 1 tablet by mouth every 8 hours as needed for Pain for up to 2 days. Do not drive or operate machinery with this. Do not take additional Tylenol with this. May cause constipation. , Disp-6 tablet, R-0Print      docusate sodium (COLACE) 100 MG capsule Take 1 capsule by mouth 2 times daily for 5 days, Disp-10 capsule, R-0Print      imipramine (TOFRANIL) 25 MG tablet TAKE 1 TABLET NIGHTLY, Disp-30 tablet, R-0Normal             DISPOSITION Decision To Discharge 05/18/2021 04:54:37 PM     Lyndsey Álvarez  05/18/21 3925

## 2021-05-18 NOTE — ED PROVIDER NOTES
ED Attending Attestation Note     Date of evaluation: 5/18/2021    This patient was seen by the advance practice provider. I have seen and examined the patient, agree with the workup, evaluation, management and diagnosis. The care plan has been discussed. I have reviewed the ECG and concur with the ALEJANDRO's interpretation. My assessment reveals patient with rectal pain and bleeding. Suprapubic tenderness on exam.  Found to have leukocytosis and on CT uncomplicated diverticulitis. Started on IV abx in ED and will start oral outpatient antibiotics.      George Bennett MD  05/18/21 0498

## 2021-05-19 NOTE — ED NOTES
Bed: A04-04  Expected date:   Expected time:   Means of arrival:   Comments:  Irma Gaines RN  09/10/18 7576 Dose was increased and patient would need to  new prescription earlier. Prior authorization may be required due to monthly dosing limits and would require override. Please confirm if that is issue and resubmit if necessary stating dose increase./Lolita Cervantes, CNP

## 2021-05-21 ENCOUNTER — HOSPITAL ENCOUNTER (EMERGENCY)
Age: 76
Discharge: HOME | End: 2021-05-21
Attending: EMERGENCY MEDICINE
Payer: MEDICARE

## 2021-05-21 VITALS
SYSTOLIC BLOOD PRESSURE: 170 MMHG | OXYGEN SATURATION: 100 % | WEIGHT: 165 LBS | HEIGHT: 68 IN | TEMPERATURE: 98.4 F | HEART RATE: 60 BPM | RESPIRATION RATE: 20 BRPM | DIASTOLIC BLOOD PRESSURE: 88 MMHG | BODY MASS INDEX: 25.01 KG/M2

## 2021-05-21 DIAGNOSIS — E86.0 DEHYDRATION: Primary | ICD-10-CM

## 2021-05-21 LAB
A/G RATIO: 1.7 (ref 1.1–2.2)
ALBUMIN SERPL-MCNC: 4.5 G/DL (ref 3.4–5)
ALP BLD-CCNC: 75 U/L (ref 40–129)
ALT SERPL-CCNC: <5 U/L (ref 10–40)
ANION GAP SERPL CALCULATED.3IONS-SCNC: 11 MMOL/L (ref 3–16)
AST SERPL-CCNC: 18 U/L (ref 15–37)
BASOPHILS ABSOLUTE: 0 K/UL (ref 0–0.2)
BASOPHILS RELATIVE PERCENT: 0.2 %
BILIRUB SERPL-MCNC: 0.6 MG/DL (ref 0–1)
BUN BLDV-MCNC: 15 MG/DL (ref 7–20)
CALCIUM SERPL-MCNC: 9.1 MG/DL (ref 8.3–10.6)
CHLORIDE BLD-SCNC: 101 MMOL/L (ref 99–110)
CO2: 26 MMOL/L (ref 21–32)
CREAT SERPL-MCNC: 0.9 MG/DL (ref 0.8–1.3)
EOSINOPHILS ABSOLUTE: 0.1 K/UL (ref 0–0.6)
EOSINOPHILS RELATIVE PERCENT: 0.7 %
GFR AFRICAN AMERICAN: >60
GFR NON-AFRICAN AMERICAN: >60
GLOBULIN: 2.7 G/DL
GLUCOSE BLD-MCNC: 119 MG/DL (ref 70–99)
HCT VFR BLD CALC: 37.5 % (ref 40.5–52.5)
HEMOGLOBIN: 12.7 G/DL (ref 13.5–17.5)
LACTIC ACID: 1.1 MMOL/L (ref 0.4–2)
LYMPHOCYTES ABSOLUTE: 1.3 K/UL (ref 1–5.1)
LYMPHOCYTES RELATIVE PERCENT: 14.7 %
MCH RBC QN AUTO: 32 PG (ref 26–34)
MCHC RBC AUTO-ENTMCNC: 33.7 G/DL (ref 31–36)
MCV RBC AUTO: 94.7 FL (ref 80–100)
MONOCYTES ABSOLUTE: 1.4 K/UL (ref 0–1.3)
MONOCYTES RELATIVE PERCENT: 15.8 %
NEUTROPHILS ABSOLUTE: 6 K/UL (ref 1.7–7.7)
NEUTROPHILS RELATIVE PERCENT: 68.6 %
PDW BLD-RTO: 13.9 % (ref 12.4–15.4)
PLATELET # BLD: 199 K/UL (ref 135–450)
PMV BLD AUTO: 8.1 FL (ref 5–10.5)
POTASSIUM REFLEX MAGNESIUM: 4.2 MMOL/L (ref 3.5–5.1)
RBC # BLD: 3.96 M/UL (ref 4.2–5.9)
SODIUM BLD-SCNC: 138 MMOL/L (ref 136–145)
TOTAL PROTEIN: 7.2 G/DL (ref 6.4–8.2)
WBC # BLD: 8.8 K/UL (ref 4–11)

## 2021-05-21 PROCEDURE — 83605 ASSAY OF LACTIC ACID: CPT

## 2021-05-21 PROCEDURE — 2580000003 HC RX 258: Performed by: STUDENT IN AN ORGANIZED HEALTH CARE EDUCATION/TRAINING PROGRAM

## 2021-05-21 PROCEDURE — 85025 COMPLETE CBC W/AUTO DIFF WBC: CPT

## 2021-05-21 PROCEDURE — 96360 HYDRATION IV INFUSION INIT: CPT

## 2021-05-21 PROCEDURE — 99283 EMERGENCY DEPT VISIT LOW MDM: CPT

## 2021-05-21 PROCEDURE — 80053 COMPREHEN METABOLIC PANEL: CPT

## 2021-05-21 RX ORDER — SODIUM CHLORIDE, SODIUM LACTATE, POTASSIUM CHLORIDE, CALCIUM CHLORIDE 600; 310; 30; 20 MG/100ML; MG/100ML; MG/100ML; MG/100ML
1000 INJECTION, SOLUTION INTRAVENOUS ONCE
Status: COMPLETED | OUTPATIENT
Start: 2021-05-21 | End: 2021-05-21

## 2021-05-21 RX ORDER — SODIUM CHLORIDE 9 MG/ML
1000 INJECTION, SOLUTION INTRAVENOUS CONTINUOUS
Status: DISCONTINUED | OUTPATIENT
Start: 2021-05-21 | End: 2021-05-21

## 2021-05-21 RX ADMIN — SODIUM CHLORIDE, POTASSIUM CHLORIDE, SODIUM LACTATE AND CALCIUM CHLORIDE 1000 ML: 600; 310; 30; 20 INJECTION, SOLUTION INTRAVENOUS at 16:56

## 2021-05-21 ASSESSMENT — ENCOUNTER SYMPTOMS
DIARRHEA: 1
WHEEZING: 0
COUGH: 0
STRIDOR: 0
CHOKING: 0
SHORTNESS OF BREATH: 0
ABDOMINAL PAIN: 1
CHEST TIGHTNESS: 0
EYES NEGATIVE: 1
RESPIRATORY NEGATIVE: 1

## 2021-05-21 ASSESSMENT — PAIN DESCRIPTION - FREQUENCY: FREQUENCY: CONTINUOUS

## 2021-05-21 ASSESSMENT — PAIN DESCRIPTION - ORIENTATION: ORIENTATION: OTHER (COMMENT)

## 2021-05-21 ASSESSMENT — PAIN DESCRIPTION - PAIN TYPE: TYPE: ACUTE PAIN

## 2021-05-21 ASSESSMENT — PAIN DESCRIPTION - DESCRIPTORS: DESCRIPTORS: OTHER (COMMENT)

## 2021-05-21 ASSESSMENT — PAIN DESCRIPTION - LOCATION: LOCATION: THROAT

## 2021-05-21 NOTE — ED PROVIDER NOTES
ED Attending Attestation Note     Date of evaluation: 5/21/2021    This patient was seen by the resident. I have seen and examined the patient, agree with the workup, evaluation, management and diagnosis. The care plan has been discussed. My assessment reveals a 35-year-old man recently diagnosed with diverticulitis with diarrhea, dysphagia, and intermittent abdominal pain. On my examination his abdomen is soft without rebound or guarding. He is somewhat slow to answer with cogwheel rigidity, but is conversant although a little bit confused.         Angel Godoy MD  05/21/21 0946

## 2021-05-21 NOTE — ED PROVIDER NOTES
[address] [facility] RESIDENT NOTE       Date of evaluation: 5/21/2021    Chief Complaint     Pharyngitis (for 2 days) and Altered Mental Status (pt has been confused today and is having trouble walking today)      History of Present Illness     Roxane Hoyos is a 68 y.o. male who presents with complaints of throat pain since Wednesday as well as abdominal pain that has not gotten better since Tuesday. Patient stated that he was seen here this week CT abdomen pelvis showed diverticulitis. Patient was started on [place] for 10 days and discharged home. Patient stated that he has not gotten any better since being discharged from the ED. He denies any change in his bowel habits denies any blood in his stool. Patient stated that he has had diarrhea but that was prior to hitting the ED on Wednesday. Patient's wife stated that she thought he was actually getting better. Currently patient complains of a out of 10 pain in the abdomen. He denies any sick contacts, fevers chills, chest pain, shortness of breath. Review of Systems     Review of Systems   Constitutional: Negative. Negative for chills, diaphoresis, fatigue, fever and unexpected weight change. HENT: Negative. Eyes: Negative. Respiratory: Negative. Negative for cough, choking, chest tightness, shortness of breath, wheezing and stridor. Cardiovascular: Negative. Negative for chest pain, palpitations and leg swelling. Gastrointestinal: Positive for abdominal pain and diarrhea. Endocrine: Negative. Genitourinary: Negative. Negative for dysuria. Musculoskeletal: Negative. Negative for arthralgias. Skin: Negative. Neurological: Negative. Negative for dizziness, tremors, seizures, syncope, facial asymmetry, speech difficulty, weakness, light-headedness, numbness and headaches. Psychiatric/Behavioral: Negative.         Past Medical, Surgical, Family, and Social History He has a past medical history of Anxiety, Arthritis, Arthritis of hand, right, Sneed esophagus, Sneed esophagus, Bilateral carotid artery stenosis <50%, BPH (benign prostatic hypertrophy), CAD (coronary artery disease), Carotid stenosis, Chest pain, Chronic back pain, Chronic back pain, Constipation, Degenerative arthritis of thoracic spine, Degenerative cervical disc, Depression with anxiety, Diverticulosis, Elevated PSA, Erectile dysfunction, Essential hypertension, benign, GERD (gastroesophageal reflux disease), Hemorrhoid, Hiatal hernia, Hyperlipidemia, Impaired fasting glucose, Osteopenia per CXR, Parkinson disease ([address] Renal stone, RLS (restless legs syndrome), S/P colonoscopy, S/P endoscopy, Sigmoidoscopy exam, Therapeutic drug monitoring, Thoracic spondylosis, Venous insufficiency of leg, and Vocal cord paresis. He has a past surgical history that includes Cataract removal; Colonoscopy (0523;1587); Upper gastrointestinal endoscopy (5/07); Knee cartilage surgery; Lithotripsy; osteotomy (Left); Knee Arthroplasty (Left, 7/30/13); TURP (N/A, [id number]); and joint replacement (Right, 02/23/2017). His family history includes Alcohol Abuse in his father; Cancer (age of onset: 68) in his sister; Coronary Art Dis (age of onset: 77) in his brother; Coronary Art Dis (age of onset: 80) in his mother; Heart Disease in his sister; Kidney Disease (age of onset: 61) in his father. He reports that he has never smoked. He has never used smokeless tobacco. He reports current alcohol use. He reports that he does not use drugs.     Medications     Previous Medications    AMOXICILLIN-CLAVULANATE (AUGMENTIN) 875-125 MG PER TABLET    Take 1 tablet by mouth 2 times daily for 10 days    ASPIRIN 81 MG EC TABLET      Take 81 mg by mouth three times a week On Mondays, Wednesdays and Fridays    CARBIDOPA-LEVODOPA (SINEMET CR)  MG PER EXTENDED RELEASE TABLET    Take 1 tablet by mouth nightly 2300    CARBIDOPA-LEVODOPA (SINEMET)  MG PER TABLET    Take 3 tablets by mouth 4 times daily Take 3 tablets at 0800, 1200, 1600 and 2000. CHOLECALCIFEROL (VITAMIN D3) 2000 UNITS CAPS      Take 1 capsule by mouth daily     CLONAZEPAM (KLONOPIN) 0.5 MG TABLET    Take 1 mg by mouth nightly     DOCUSATE SODIUM (COLACE) 100 MG CAPSULE    Take 1 capsule by mouth 2 times daily for 5 days    DONEPEZIL (ARICEPT) 5 MG TABLET    Take 5 mg by mouth nightly    IMIPRAMINE (TOFRANIL) 25 MG TABLET    TAKE 1 TABLET NIGHTLY    LINACLOTIDE (LINZESS) 290 MCG CAPS CAPSULE    Take 290 mcg by mouth every morning (before breakfast)    MODAFINIL (PROVIGIL) 100 MG TABLET    Take  mg by mouth every morning. PANTOPRAZOLE (PROTONIX) 40 MG TABLET      Take 40 mg by mouth daily     POLYETHYLENE GLYCOL 3350 (MIRALAX PO)      Take 17 g by mouth daily as needed     SIMVASTATIN (ZOCOR) 10 MG TABLET    Take 1 tablet by mouth nightly       Allergies     He is allergic to levofloxacin. Physical Exam     INITIAL VITALS: BP: 137/78, Temp: 98.1 °F (36.7 °C), Pulse: 62, Resp: 18, SpO2: 97 %   Physical Exam  Constitutional:       General: He is not in acute distress. Appearance: He is not ill-appearing, toxic-appearing or diaphoretic. HENT:      Head: Normocephalic and atraumatic. Mouth/Throat:      Mouth: Mucous membranes are moist. No oral lesions. Pharynx: Posterior oropharyngeal erythema present. No pharyngeal swelling or oropharyngeal exudate. Tonsils: No tonsillar exudate or tonsillar abscesses. 0 on the right. 0 on the left. Eyes:      Extraocular Movements:      Right eye: Normal extraocular motion. Left eye: Normal extraocular motion. Conjunctiva/sclera: Conjunctivae normal.      Pupils: Pupils are equal, round, and reactive to light. Cardiovascular:      Rate and Rhythm: Normal rate and regular rhythm. Heart sounds: Normal heart sounds. No murmur heard. No friction rub. No gallop.     Pulmonary:      Effort: Pulmonary effort is normal. No respiratory distress. Breath sounds: Normal breath sounds. No stridor. No wheezing, rhonchi or rales. Chest:      Chest wall: No tenderness. Abdominal:      General: There is no distension. Palpations: Abdomen is soft. There is no mass. Tenderness: There is abdominal tenderness. There is no guarding or rebound. Musculoskeletal:      Cervical back: Normal range of motion and neck supple. Lymphadenopathy:      Cervical: No cervical adenopathy. Skin:     General: Skin is warm and dry. Coloration: Skin is not pale. Findings: No erythema or rash. Neurological:      General: No focal deficit present. Mental Status: He is alert and oriented to person, place, and time.    Psychiatric:         Mood and Affect: Mood normal.         Behavior: Behavior normal.         Diagnostic Results       RADIOLOGY:  No orders to display       LABS:   Results for orders placed or performed during the hospital encounter of 05/21/21   Lactic Acid, Plasma   Result Value Ref Range    Lactic Acid 1.1 0.4 - 2.0 mmol/L   CBC Auto Differential   Result Value Ref Range    WBC 8.8 4.0 - 11.0 K/uL    RBC 3.96 (L) 4.20 - 5.90 M/uL    Hemoglobin 12.7 (L) 13.5 - 17.5 g/dL    Hematocrit 37.5 (L) 40.5 - 52.5 %    MCV 94.7 80.0 - 100.0 fL    MCH 32.0 26.0 - 34.0 pg    MCHC 33.7 31.0 - 36.0 g/dL    RDW 13.9 12.4 - 15.4 %    Platelets 580 074 - 035 K/uL    MPV 8.1 5.0 - 10.5 fL    Neutrophils % 68.6 %    Lymphocytes % 14.7 %    Monocytes % 15.8 %    Eosinophils % 0.7 %    Basophils % 0.2 %    Neutrophils Absolute 6.0 1.7 - 7.7 K/uL    Lymphocytes Absolute 1.3 1.0 - 5.1 K/uL    Monocytes Absolute 1.4 (H) 0.0 - 1.3 K/uL    Eosinophils Absolute 0.1 0.0 - 0.6 K/uL    Basophils Absolute 0.0 0.0 - 0.2 K/uL   Comprehensive Metabolic Panel w/ Reflex to MG   Result Value Ref Range    Sodium 138 136 - 145 mmol/L    Potassium reflex Magnesium 4.2 3.5 - 5.1 mmol/L    Chloride 101 99 - 110 mmol/L

## 2021-05-24 ENCOUNTER — APPOINTMENT (OUTPATIENT)
Dept: CT IMAGING | Age: 76
End: 2021-05-24
Payer: MEDICARE

## 2021-05-24 ENCOUNTER — HOSPITAL ENCOUNTER (EMERGENCY)
Age: 76
Discharge: HOME | End: 2021-05-24
Attending: EMERGENCY MEDICINE
Payer: MEDICARE

## 2021-05-24 VITALS
HEIGHT: 68 IN | WEIGHT: 165 LBS | OXYGEN SATURATION: 97 % | HEART RATE: 50 BPM | DIASTOLIC BLOOD PRESSURE: 65 MMHG | TEMPERATURE: 98 F | SYSTOLIC BLOOD PRESSURE: 121 MMHG | BODY MASS INDEX: 25.01 KG/M2 | RESPIRATION RATE: 18 BRPM

## 2021-05-24 DIAGNOSIS — M54.50 ACUTE MIDLINE LOW BACK PAIN WITHOUT SCIATICA: Primary | ICD-10-CM

## 2021-05-24 LAB
EKG ATRIAL RATE: 53 BPM
EKG DIAGNOSIS: NORMAL
EKG P AXIS: -14 DEGREES
EKG P-R INTERVAL: 112 MS
EKG Q-T INTERVAL: 420 MS
EKG QRS DURATION: 84 MS
EKG QTC CALCULATION (BAZETT): 394 MS
EKG R AXIS: -7 DEGREES
EKG T AXIS: -12 DEGREES
EKG VENTRICULAR RATE: 53 BPM

## 2021-05-24 PROCEDURE — 97530 THERAPEUTIC ACTIVITIES: CPT

## 2021-05-24 PROCEDURE — 96374 THER/PROPH/DIAG INJ IV PUSH: CPT

## 2021-05-24 PROCEDURE — 6370000000 HC RX 637 (ALT 250 FOR IP): Performed by: STUDENT IN AN ORGANIZED HEALTH CARE EDUCATION/TRAINING PROGRAM

## 2021-05-24 PROCEDURE — 72131 CT LUMBAR SPINE W/O DYE: CPT

## 2021-05-24 PROCEDURE — 72128 CT CHEST SPINE W/O DYE: CPT

## 2021-05-24 PROCEDURE — 99285 EMERGENCY DEPT VISIT HI MDM: CPT

## 2021-05-24 PROCEDURE — 97161 PT EVAL LOW COMPLEX 20 MIN: CPT

## 2021-05-24 PROCEDURE — 6360000002 HC RX W HCPCS: Performed by: STUDENT IN AN ORGANIZED HEALTH CARE EDUCATION/TRAINING PROGRAM

## 2021-05-24 PROCEDURE — 97116 GAIT TRAINING THERAPY: CPT

## 2021-05-24 PROCEDURE — 93005 ELECTROCARDIOGRAM TRACING: CPT | Performed by: EMERGENCY MEDICINE

## 2021-05-24 RX ORDER — LIDOCAINE 4 G/G
2 PATCH TOPICAL DAILY
Status: DISCONTINUED | OUTPATIENT
Start: 2021-05-24 | End: 2021-05-24 | Stop reason: HOSPADM

## 2021-05-24 RX ORDER — LIDOCAINE 50 MG/G
1 PATCH TOPICAL DAILY
Qty: 30 PATCH | Refills: 0 | Status: SHIPPED | OUTPATIENT
Start: 2021-05-24

## 2021-05-24 RX ORDER — KETOROLAC TROMETHAMINE 30 MG/ML
15 INJECTION, SOLUTION INTRAMUSCULAR; INTRAVENOUS ONCE
Status: COMPLETED | OUTPATIENT
Start: 2021-05-24 | End: 2021-05-24

## 2021-05-24 RX ADMIN — KETOROLAC TROMETHAMINE 15 MG: 30 INJECTION, SOLUTION INTRAMUSCULAR at 12:14

## 2021-05-24 ASSESSMENT — ENCOUNTER SYMPTOMS
VOMITING: 0
WHEEZING: 0
BLOOD IN STOOL: 0
DIARRHEA: 0
ABDOMINAL PAIN: 0
EYE DISCHARGE: 0
RHINORRHEA: 0
NAUSEA: 0
COUGH: 0
BACK PAIN: 1
CONSTIPATION: 0
SORE THROAT: 0
EYE ITCHING: 0
SHORTNESS OF BREATH: 0

## 2021-05-24 ASSESSMENT — PAIN SCALES - GENERAL
PAINLEVEL_OUTOF10: 0
PAINLEVEL_OUTOF10: 3
PAINLEVEL_OUTOF10: 10

## 2021-05-24 ASSESSMENT — PAIN DESCRIPTION - ONSET: ONSET: SUDDEN

## 2021-05-24 ASSESSMENT — PAIN DESCRIPTION - ORIENTATION: ORIENTATION: MID

## 2021-05-24 ASSESSMENT — PAIN DESCRIPTION - LOCATION: LOCATION: BACK

## 2021-05-24 ASSESSMENT — PAIN DESCRIPTION - DESCRIPTORS: DESCRIPTORS: SHARP

## 2021-05-24 ASSESSMENT — PAIN DESCRIPTION - PAIN TYPE: TYPE: ACUTE PAIN

## 2021-05-24 NOTE — ED PROVIDER NOTES
ED Attending Attestation Note     Date of evaluation: 5/24/2021    This patient was seen by the resident. I have seen and examined the patient, agree with the workup, evaluation, management and diagnosis. The care plan has been discussed. My assessment reveals that presents with back pain. On exam Lidoderm patch in place. Patient had CT of TLS which were unremarkable for acute pathology. We are able to get patient is said to side of bed. He does have severe Parkinson's. Normally ambulates with a cane or walker although sometimes he uses neither. He was able to with help to sit at the edge of the bed and then get up and take 2 or 3 steps. Wife was at bedside states that he seems to be at baseline and his pain is much improved than it was upon arrival per resident. Alexa Terrazas MD  05/24/21 3449

## 2021-05-24 NOTE — ED PROVIDER NOTES
Sneed esophagus, Bilateral carotid artery stenosis <50%, BPH (benign prostatic hypertrophy), CAD (coronary artery disease), Carotid stenosis, Chest pain, Chronic back pain, Chronic back pain, Constipation, Degenerative arthritis of thoracic spine, Degenerative cervical disc, Depression with anxiety, Diverticulosis, Elevated PSA, Erectile dysfunction, Essential hypertension, benign, GERD (gastroesophageal reflux disease), Hemorrhoid, Hiatal hernia, Hyperlipidemia, Impaired fasting glucose, Osteopenia per CXR, Parkinson disease ([address] Renal stone, RLS (restless legs syndrome), S/P colonoscopy, S/P endoscopy, Sigmoidoscopy exam, Therapeutic drug monitoring, Thoracic spondylosis, Venous insufficiency of leg, and Vocal cord paresis. He has a past surgical history that includes Cataract removal; Colonoscopy (1727;3650); Upper gastrointestinal endoscopy (5/07); Knee cartilage surgery; Lithotripsy; osteotomy (Left); Knee Arthroplasty (Left, 7/30/13); TURP (N/A, [id number]); and joint replacement (Right, 02/23/2017). His family history includes Alcohol Abuse in his father; Cancer (age of onset: 68) in his sister; Coronary Art Dis (age of onset: 77) in his brother; Coronary Art Dis (age of onset: 80) in his mother; Heart Disease in his sister; Kidney Disease (age of onset: 61) in his father. He reports that he has never smoked. He has never used smokeless tobacco. He reports current alcohol use. He reports that he does not use drugs.     Medications     Previous Medications    AMOXICILLIN-CLAVULANATE (AUGMENTIN) 875-125 MG PER TABLET    Take 1 tablet by mouth 2 times daily for 10 days    ASPIRIN 81 MG EC TABLET      Take 81 mg by mouth three times a week On Mondays, Wednesdays and Fridays    CARBIDOPA-LEVODOPA (SINEMET CR)  MG PER EXTENDED RELEASE TABLET    Take 1 tablet by mouth nightly 2300    CARBIDOPA-LEVODOPA (SINEMET)  MG PER TABLET    Take 3 tablets by mouth 4 times daily Take 3 tablets at 0800, 1200, 1600 and 2000. CHOLECALCIFEROL (VITAMIN D3) 2000 UNITS CAPS      Take 1 capsule by mouth daily     CLONAZEPAM (KLONOPIN) 0.5 MG TABLET    Take 1 mg by mouth nightly     DONEPEZIL (ARICEPT) 5 MG TABLET    Take 5 mg by mouth nightly    IMIPRAMINE (TOFRANIL) 25 MG TABLET    TAKE 1 TABLET NIGHTLY    LINACLOTIDE (LINZESS) 290 MCG CAPS CAPSULE    Take 290 mcg by mouth every morning (before breakfast)    MODAFINIL (PROVIGIL) 100 MG TABLET    Take  mg by mouth every morning. PANTOPRAZOLE (PROTONIX) 40 MG TABLET      Take 40 mg by mouth daily     POLYETHYLENE GLYCOL 3350 (MIRALAX PO)      Take 17 g by mouth daily as needed     SIMVASTATIN (ZOCOR) 10 MG TABLET    Take 1 tablet by mouth nightly       Allergies     He is allergic to levofloxacin. Physical Exam     INITIAL VITALS: BP: (!) 192/89, Temp: 98.1 °F (36.7 °C), Pulse: 54, Resp: 18, SpO2: 95 %   Physical Exam  Constitutional:       General: He is not in acute distress. Appearance: He is well-developed. He is not diaphoretic. HENT:      Head: Normocephalic and atraumatic. Right Ear: External ear normal.      Left Ear: External ear normal.   Eyes:      Conjunctiva/sclera: Conjunctivae normal.      Pupils: Pupils are equal, round, and reactive to light. Neck:      Trachea: No tracheal deviation. Cardiovascular:      Rate and Rhythm: Normal rate and regular rhythm. Pulses: Normal pulses. Heart sounds: No murmur heard. No friction rub. No gallop. Comments: Bilateral radial and DP pulses strong and symmetric    Pulmonary:      Effort: Pulmonary effort is normal. No respiratory distress. Breath sounds: Normal breath sounds. No wheezing or rales. Chest:      Chest wall: No tenderness. Abdominal:      General: Bowel sounds are normal. There is no distension. Palpations: Abdomen is soft. Tenderness: There is no abdominal tenderness. There is no guarding or rebound.    Musculoskeletal:         General: Tenderness present. No deformity. Cervical back: Normal range of motion and neck supple. Comments: TTP of the upper and mid mildine lumbar spine, no significant paraspinal tenderness. Bilateral straight leg raise and crossed leg raise negative. Skin:     General: Skin is warm and dry. Capillary Refill: Capillary refill takes less than 2 seconds. Findings: No rash. Neurological:      Mental Status: He is alert and oriented to person, place, and time. Cranial Nerves: No cranial nerve deficit. Sensory: No sensory deficit. Motor: No weakness. Coordination: Coordination normal.         DiagnosticResults     EKG   Interpreted in conjunction with emergencydepartment physician No att. providers found  Rhythm: sinus bradycardia  Rate: normal  Axis: normal  Ectopy: none  Conduction: normal  ST Segments: no acute change  T Waves:inversion in  v1  Q Waves: none  Clinical Impression: no acute changes  Comparison: No acute ischemic changes. RADIOLOGY:  CT THORACIC SPINE WO CONTRAST   Final Result      Mild multilevel degenerative changes of the thoracic spine, with no acute findings. Multilevel degenerative disc disease and hypertrophic degenerative changes of the lumbar spine as described above by level. There is canal stenosis at the L3-L4 and L4-L5 levels as described. No acute findings in the thoracic or lumbar spine. CT LUMBAR SPINE WO CONTRAST   Final Result      Mild multilevel degenerative changes of the thoracic spine, with no acute findings. Multilevel degenerative disc disease and hypertrophic degenerative changes of the lumbar spine as described above by level. There is canal stenosis at the L3-L4 and L4-L5 levels as described. No acute findings in the thoracic or lumbar spine.           LABS:   Results for orders placed or performed during the hospital encounter of 05/24/21   EKG 12 Lead   Result Value Ref Range    Ventricular Rate 53 BPM    Atrial Rate 53 access, and osteomyelitis lower on the differential. No significant weakness, saddle anesthesia, or changes in  bowel/bladder dysfunction makes cauda equina syndrome  Unlikely. PT evaluated the patient in the E D as he walks poorly at baseline and occasionally with an assistive device at home. PT cleared patient for home with his assistive device, he ambulated well around the ED with walker. Patient pain remains well controlled without recurrence in the ED. This patient was also evaluated by the attending physician. All care plans werediscussed and agreed upon. Clinical Impression     1. Acute midline low back pain without sciatica        Disposition     PATIENT REFERRED TO:  No follow-up provider specified. DISCHARGE MEDICATIONS:  New Prescriptions    LIDOCAINE (LIDODERM) 5 %    Place 1 patch onto the skin daily 12 hours on, 12 hours off.        DISPOSITION Decision To Discharge 05/24/2021 03:33:53 Bailee Jeffrey MD  Resident  05/24/21 3283

## 2021-05-24 NOTE — ED NOTES
Bed: A07-07  Expected date:   Expected time:   Means of arrival:   Comments:  Atiya Verma 707, [phone] [facility]  05/24/21 4083

## 2021-05-24 NOTE — PROGRESS NOTES
Physical Therapy    Facility/Department: [address] [place] [address]  Initial Assessment/Treatment/Discharge    NAME: Magi Gallagher  : 1945  MRN: [id number]    Date of Service: 2021    Discharge Recommendations:    Magi Gallagher scored a 19/ on the AM-PAC short mobility form. Current research shows that an AM-PAC score of 18 or greater is typically associated with a discharge to the patient's home setting. Based on the patient's AM-PAC score and their current functional mobility deficits, it is recommended that the patient have 2-3 sessions per week of Physical Therapy at d/c to increase the patient's independence. At this time, this patient demonstrates the endurance and safety to discharge home with outpt PT and a follow up treatment frequency of 2-3x/wk. Please see assessment section for further patient specific details. If patient discharges prior to next session this note will serve as a discharge summary. Please see below for the latest assessment towards goals. PT Equipment Recommendations  Equipment Needed: No    Assessment   Assessment: Wife observed pt ambulating & reported pt is at baseline. Pt & wife have no concerns regarding D/C home. Recommend pt resume outpt PT & Parkinson's exercise programs when able. (pt has not had PT since he's been in [place] for past ~6 months). Pt & wife agreeable. Pt to D/C home today per MD so will D/C PT. Decision Making: Medium Complexity  PT Education: PT Role;Transfer Training;General Safety;Gait Training  No Skilled PT: At baseline function  REQUIRES PT FOLLOW UP: No  Activity Tolerance  Activity Tolerance: Patient Tolerated treatment well       Patient Diagnosis(es): The encounter diagnosis was Acute midline low back pain without sciatica.      has a past medical history of Anxiety, Arthritis, Arthritis of hand, right, Sneed esophagus, Sneed esophagus, Bilateral carotid artery stenosis <50%, BPH (benign prostatic hypertrophy), CAD (coronary artery disease), Carotid stenosis, Chest pain, Chronic back pain, Chronic back pain, Constipation, Degenerative arthritis of thoracic spine, Degenerative cervical disc, Depression with anxiety, Diverticulosis, Elevated PSA, Erectile dysfunction, Essential hypertension, benign, GERD (gastroesophageal reflux disease), Hemorrhoid, Hiatal hernia, Hyperlipidemia, Impaired fasting glucose, Osteopenia per CXR, Parkinson disease ([address] Renal stone, RLS (restless legs syndrome), S/P colonoscopy, S/P endoscopy, Sigmoidoscopy exam, Therapeutic drug monitoring, Thoracic spondylosis, Venous insufficiency of leg, and Vocal cord paresis. has a past surgical history that includes Cataract removal; Colonoscopy (2141;9052); Upper gastrointestinal endoscopy (5/07); Knee cartilage surgery; Lithotripsy; osteotomy (Left); Knee Arthroplasty (Left, 7/30/13); TURP (N/A, [id number]); and joint replacement (Right, 02/23/2017). Restrictions     Vision/Hearing  Vision: Within Functional Limits  Hearing: Within functional limits     Subjective  General  Chart Reviewed: Yes  Patient assessed for rehabilitation services?: Yes  Additional Pertinent Hx: PMH: OA, CAD, Parkinson's. Pt admitted with thoracic back pain. CT negative  Family / Caregiver Present: Yes (wife)  Referring Practitioner: Erick Pritchard  Referral Date : 05/24/21  Diagnosis: back pain  Follows Commands: Within Functional Limits  Subjective  Subjective: Pt supine in bed & agreeable to PT.   Pain Screening  Patient Currently in Pain: No  Vital Signs  Patient Currently in Pain: No       Orientation  Orientation  Overall Orientation Status: Within Normal Limits  Social/Functional History  Social/Functional History  Lives With: Spouse  Type of Home: House  Home Layout: Able to Live on Main level with bedroom/bathroom, Two level  Home Access: Stairs to enter with rails  Entrance Stairs - Number of Steps: 1  Bathroom Shower/Tub: Tub/Shower unit  Bathroom Toilet: Handicap height  Bathroom Equipment: Grab bars in shower, Grab bars around toilet  Home Equipment: Rolling walker, Cane, [employer] cane  ADL Assistance: Independent (occ assist with dressing)  Homemaking Assistance:  (wife does most, pt does some)  Ambulation Assistance: Independent (uses st cane outside)  Transfer Assistance: Independent  Active : No  Additional Comments: pt falls about once a month, last fall about a month ago. Objective          AROM RLE (degrees)  RLE AROM: WNL  AROM LLE (degrees)  LLE AROM : WNL  Strength RLE  Strength RLE: WFL  Strength LLE  Strength LLE: WFL        Bed mobility  Supine to Sit: Stand by assistance (HOB elevated)  Sit to Supine: Stand by assistance (HOB elevated)  Transfers  Sit to Stand: Stand by assistance (from bed)  Stand to sit: Stand by assistance  Ambulation  Ambulation?: Yes  Ambulation 1  Surface: level tile  Device: Rolling Walker  Assistance: Contact guard assistance;Stand by assistance (CGA progressing to SBA)  Quality of Gait: flexed posture  Gait Deviations: Decreased step length;Decreased step height  Distance: 200 ft  Comments: cues for posture & positioning. no LOB.   Stairs/Curb  Stairs?: No     Balance  Sitting - Static: Good  Sitting - Dynamic: Good  Standing - Static: Good (SBA with RW)  Standing - Dynamic: Fair;+ (CGA to SBA with RW)        Plan   Safety Devices  Type of devices: Call light within reach, Left in bed (MD notified)                                                     AM-PAC Score  AM-PAC Inpatient Mobility Raw Score : 19 (05/24/21 1625)  AM-PAC Inpatient T-Scale Score : 45.44 (05/24/21 1625)  Mobility Inpatient CMS 0-100% Score: 41.77 (05/24/21 1625)  Mobility Inpatient CMS G-Code Modifier : CK (05/24/21 1625)                Therapy Time   Individual Concurrent Group Co-treatment   Time In 1453         Time Out 1535         Minutes 42                 Yoana Calzada, PT

## 2021-06-08 ENCOUNTER — OFFICE VISIT (OUTPATIENT)
Dept: CARDIOLOGY CLINIC | Age: 76
End: 2021-06-08
Payer: MEDICARE

## 2021-06-08 VITALS
HEART RATE: 72 BPM | SYSTOLIC BLOOD PRESSURE: 110 MMHG | BODY MASS INDEX: 24.48 KG/M2 | WEIGHT: 161 LBS | DIASTOLIC BLOOD PRESSURE: 80 MMHG

## 2021-06-08 DIAGNOSIS — I10 ESSENTIAL HYPERTENSION: ICD-10-CM

## 2021-06-08 DIAGNOSIS — I25.10 CAD IN NATIVE ARTERY: Primary | ICD-10-CM

## 2021-06-08 DIAGNOSIS — I20.9 ANGINA PECTORIS (HCC): ICD-10-CM

## 2021-06-08 PROCEDURE — G8428 CUR MEDS NOT DOCUMENT: HCPCS | Performed by: INTERNAL MEDICINE

## 2021-06-08 PROCEDURE — 99214 OFFICE O/P EST MOD 30 MIN: CPT | Performed by: INTERNAL MEDICINE

## 2021-06-08 PROCEDURE — 1036F TOBACCO NON-USER: CPT | Performed by: INTERNAL MEDICINE

## 2021-06-08 PROCEDURE — 1123F ACP DISCUSS/DSCN MKR DOCD: CPT | Performed by: INTERNAL MEDICINE

## 2021-06-08 PROCEDURE — 4040F PNEUMOC VAC/ADMIN/RCVD: CPT | Performed by: INTERNAL MEDICINE

## 2021-06-08 PROCEDURE — G8420 CALC BMI NORM PARAMETERS: HCPCS | Performed by: INTERNAL MEDICINE

## 2021-06-08 NOTE — PROGRESS NOTES
Subjective:      Patient ID: Mandy Mccarthy is a 68 y.o. male. HPI Мария Jha is being seen for  follow up CAD/angina/HTN. Knee limits activities. No exertional sx. Bothered by [employer]. Some PINA. Unchanged. No chest pain. No tachycardia. No syncope. No pnd or orthopnea. No edema. Wt stable. BP good . Past Medical History:   Diagnosis Date    Anxiety     PCP in [place]:Dr. Morenita Roman.    Arthritis     Arthritis of hand, right 6/20/2012    Sneed esophagus     Dr. Chapito Quintana. EGD:1/17/2012. Next EGD due in 3yrs=1/2015    Sneed esophagus     Under care of GI    Bilateral carotid artery stenosis <50% 4/30/2014    BPH (benign prostatic hypertrophy)     Dr. Sera Ochoa. PSA per urology.     CAD (coronary artery disease)     under care of cards:Dr. Thad Almazan    Carotid stenosis     Chest pain     pt states he no chest pain in 5 yrs, panic attack    Chronic back pain 1/26/2012    Chronic back pain     under care of ortho spine:Dr. Sweeney    Constipation 8/6/2013    Degenerative arthritis of thoracic spine 1/26/2012    Degenerative cervical disc 1/26/2012    Depression with anxiety 11/15/2011    Klonopin per neurologist(Dr. Andrew Palmer)    Diverticulosis 1/17/2012    colonoscopy    Elevated PSA     8/21/14;5/2013:under care of Dr. Beltran/[place]:Urology group    Erectile dysfunction 6/13/2017    Essential hypertension, benign 6/20/2012    cardiologist:Dr. Dex Davalos    GERD (gastroesophageal reflux disease)     Hemorrhoid     Hiatal hernia     small    Hyperlipidemia     Impaired fasting glucose 5/19/2014    Osteopenia per CXR 5/19/2014    Parkinson disease ([facility] [address]     Dr. Dandre Harris Neurology    Renal stone 4/2012    4 mm distal left ureteral calculus:Dr. Beltran:urologist    RLS (restless legs syndrome)     S/P colonoscopy 2011;5/20/19    Dr. Sofia Morales per pt' next is in 10yrs-2021. 5/20/19(Dr. Maribel Baer)    S/P endoscopy 1/2012    Dr. Yahir Wilson acute changes:+Barrets:next in 3yrs 1/2015    Sigmoidoscopy exam 1/17/2012    Dr. Lama:No acute changes. Next in 5yrs=1/2017    Therapeutic drug monitoring 5/14/15    Consistent OARRS report on 5/14/15;8/4/15    Thoracic spondylosis     under care of ortho spine:Dr. Mercedes Boston    Venous insufficiency of leg     Vocal cord paresis 5/11/2016    under care of ENT:Dr. Milagros Malik. s/p vocal cord augmentation 6/2016 at [facility] hosp     Past Surgical History:   Procedure Laterality Date    CATARACT REMOVAL      COLONOSCOPY  2002;2011    JOINT REPLACEMENT Right 02/23/2017    Right TKR(knee)    KNEE ARTHROPLASTY Left 7/30/13    LEFT TOTAL KNEE ARTHROPLASTY              KNEE CARTILAGE SURGERY      Left x 2, right x1    LITHOTRIPSY      Kidney stone removal as per urology:Stent removed after 10days    OSTEOTOMY Left     TURP N/A [id number]    TRANSURETHRAL RESECTION OF PROSTATE    UPPER GASTROINTESTINAL ENDOSCOPY  5/07       Allergies   Allergen Reactions    Chlorpromazine Other (See Comments)    Haloperidol Lactate Other (See Comments)    Metoclopramide Other (See Comments)    Prochlorperazine Other (See Comments)    Levofloxacin Swelling     lips swell        Social History     Socioeconomic History    Marital status:      Spouse name: Not on file    Number of children: 2    Years of education: Not on file    Highest education level: Not on file   Occupational History    Occupation: retired    Tobacco Use    Smoking status: Never Smoker    Smokeless tobacco: Never Used   Vaping Use    Vaping Use: Former   Substance and Sexual Activity    Alcohol use:  Yes     Alcohol/week: 0.0 standard drinks     Comment: occasionally    Drug use: No    Sexual activity: Yes     Partners: Female   Other Topics Concern    Not on file   Social History Narrative    Not on file     Social Determinants of Health     Financial Resource Strain:     Difficulty of Paying Living Expenses:    Food Insecurity:     Worried About Running Out of Food in the Last Year:    [address] in the Last Year:    Transportation Needs:     Lack of Transportation (Medical):  Lack of Transportation (Non-Medical):    Physical Activity:     Days of Exercise per Week:     Minutes of Exercise per Session:    Stress:     Feeling of Stress :    Social Connections:     Frequency of Communication with Friends and Family:     Frequency of Social Gatherings with Friends and Family:     Attends [employer] Services:     Active Member of Clubs or Organizations:     Attends Club or Organization Meetings:     Marital Status:    Intimate Partner Violence:     Fear of Current or Ex-Partner:     Emotionally Abused:     Physically Abused:     Sexually Abused:               Current Outpatient Medications   Medication Sig Dispense Refill    lidocaine (LIDODERM) 5 % Place 1 patch onto the skin daily 12 hours on, 12 hours off. 30 patch 0    imipramine (TOFRANIL) 25 MG tablet TAKE 1 TABLET NIGHTLY 30 tablet 0    modafinil (PROVIGIL) 100 MG tablet Take  mg by mouth every morning.  simvastatin (ZOCOR) 10 MG tablet Take 1 tablet by mouth nightly 90 tablet 3    donepezil (ARICEPT) 5 MG tablet Take 5 mg by mouth nightly      linaclotide (LINZESS) 290 MCG CAPS capsule Take 290 mcg by mouth every morning (before breakfast)      clonazePAM (KLONOPIN) 0.5 MG tablet Take 1 mg by mouth nightly       carbidopa-levodopa (SINEMET)  MG per tablet Take 3 tablets by mouth 4 times daily Take 3 tablets at 0800, 1200, 1600 and 2000.       Cholecalciferol (VITAMIN D3) 2000 UNITS CAPS   Take 1 capsule by mouth daily       aspirin 81 MG EC tablet   Take 81 mg by mouth three times a week On Mondays, Wednesdays and Fridays      Polyethylene Glycol 3350 (MIRALAX PO)   Take 17 g by mouth daily as needed       pantoprazole (PROTONIX) 40 MG tablet   Take 40 mg by mouth daily       carbidopa-levodopa (SINEMET CR)  MG per extended release tablet Take 1 tablet by mouth nightly 2300       No current facility-administered medications for this visit. Vitals:    06/08/21 1456   BP: 110/80   Pulse: 72       Wt 160    Review of Systems   Constitutional: Negative for activity change. Some  fatigue. Respiratory: Negative for apnea, cough, choking, Has chest tightness and shortness of breath. Cardiovascular: Negative for chest pain, palpitations and leg swelling. No PND or orthopnea. No tachycardia. Gastrointestinal: Negative for abdominal distention. Musculoskeletal: Negative for myalgias. Neurological: Negative for light-headedness. Negative for dizziness and syncope. Psychiatric/Behavioral: Negative for behavioral problems, confusion and agitation. All other systems reviewed negative as done. Objective:   Physical Exam   Constitutional: He is oriented to person, place, and time. He appears well-developed and well-nourished. No distress. HENT:   Head: Normocephalic and atraumatic. Eyes: Conjunctivae and EOM are normal. Right eye exhibits no discharge. Left eye exhibits no discharge. Neck: Normal range of motion. Neck supple. No JVD present. Cardiovascular: Normal rate, regular rhythm, S1 normal, S2 normal and normal heart sounds. Exam reveals no gallop. No murmur heard. Pulses:       Radial pulses are 2+ on the right side, and 2+ on the left side. Pulmonary/Chest: Effort normal and breath sounds normal. No respiratory distress. He has no wheezes. He has no rales. Abdominal: Soft. Bowel sounds are normal. There is no tenderness. Musculoskeletal: Normal range of motion. He exhibits no edema. Neurological: He is alert and oriented to person, place, and time. Skin: Skin is warm and dry. Psychiatric: He has a normal mood and affect. His behavior is normal. Thought content normal.       Assessment:       Diagnosis Orders   1. CAD in native artery     2. Angina pectoris ([address]     3. Essential hypertension             Plan:      CV stable. Bothered by progressive parkinsons. PINA but unchanged. BP good. No exertional sx. No angina. Lipids per PCP. Reviewed previous records and testing including cath 8/15. Follow up 6 months.

## 2021-06-21 DIAGNOSIS — F41.9 ANXIETY: ICD-10-CM

## 2021-06-21 DIAGNOSIS — F33.0 MILD RECURRENT MAJOR DEPRESSION (HCC): ICD-10-CM

## 2021-06-22 RX ORDER — IMIPRAMINE HCL 25 MG
TABLET ORAL
Qty: 90 TABLET | OUTPATIENT
Start: 2021-06-22

## 2021-06-23 ENCOUNTER — HOSPITAL ENCOUNTER (EMERGENCY)
Age: 76
Discharge: HOME | End: 2021-06-23
Attending: EMERGENCY MEDICINE
Payer: MEDICARE

## 2021-06-23 ENCOUNTER — APPOINTMENT (OUTPATIENT)
Dept: CT IMAGING | Age: 76
End: 2021-06-23
Payer: MEDICARE

## 2021-06-23 ENCOUNTER — APPOINTMENT (OUTPATIENT)
Dept: GENERAL RADIOLOGY | Age: 76
End: 2021-06-23
Payer: MEDICARE

## 2021-06-23 VITALS
HEIGHT: 68 IN | SYSTOLIC BLOOD PRESSURE: 154 MMHG | DIASTOLIC BLOOD PRESSURE: 68 MMHG | BODY MASS INDEX: 25.01 KG/M2 | WEIGHT: 165 LBS | HEART RATE: 71 BPM | RESPIRATION RATE: 20 BRPM | TEMPERATURE: 98.8 F | OXYGEN SATURATION: 97 %

## 2021-06-23 DIAGNOSIS — R07.9 CHEST PAIN, UNSPECIFIED TYPE: Primary | ICD-10-CM

## 2021-06-23 LAB
ALBUMIN SERPL-MCNC: 4.2 G/DL (ref 3.4–5)
ALP BLD-CCNC: 80 U/L (ref 40–129)
ALT SERPL-CCNC: <5 U/L (ref 10–40)
ANION GAP SERPL CALCULATED.3IONS-SCNC: 11 MMOL/L (ref 3–16)
AST SERPL-CCNC: 19 U/L (ref 15–37)
BASE EXCESS VENOUS: 1.8 MMOL/L (ref -2–3)
BASOPHILS ABSOLUTE: 0 K/UL (ref 0–0.2)
BASOPHILS RELATIVE PERCENT: 0.3 %
BILIRUB SERPL-MCNC: 0.5 MG/DL (ref 0–1)
BILIRUBIN DIRECT: <0.2 MG/DL (ref 0–0.3)
BILIRUBIN URINE: NEGATIVE
BILIRUBIN, INDIRECT: ABNORMAL MG/DL (ref 0–1)
BLOOD, URINE: NEGATIVE
BUN BLDV-MCNC: 20 MG/DL (ref 7–20)
CALCIUM SERPL-MCNC: 9.3 MG/DL (ref 8.3–10.6)
CARBOXYHEMOGLOBIN: 1 % (ref 0–1.5)
CHLORIDE BLD-SCNC: 100 MMOL/L (ref 99–110)
CLARITY: CLEAR
CO2: 25 MMOL/L (ref 21–32)
COLOR: YELLOW
CREAT SERPL-MCNC: 0.9 MG/DL (ref 0.8–1.3)
EKG ATRIAL RATE: 60 BPM
EKG DIAGNOSIS: NORMAL
EKG P AXIS: 79 DEGREES
EKG P-R INTERVAL: 106 MS
EKG Q-T INTERVAL: 418 MS
EKG QRS DURATION: 88 MS
EKG QTC CALCULATION (BAZETT): 418 MS
EKG R AXIS: 16 DEGREES
EKG T AXIS: 41 DEGREES
EKG VENTRICULAR RATE: 60 BPM
EOSINOPHILS ABSOLUTE: 0.3 K/UL (ref 0–0.6)
EOSINOPHILS RELATIVE PERCENT: 3.8 %
GFR AFRICAN AMERICAN: >60
GFR NON-AFRICAN AMERICAN: >60
GLUCOSE BLD-MCNC: 101 MG/DL (ref 70–99)
GLUCOSE URINE: NEGATIVE MG/DL
HCO3 VENOUS: 27.9 MMOL/L (ref 24–28)
HCT VFR BLD CALC: 39.7 % (ref 40.5–52.5)
HEMOGLOBIN, VEN, REDUCED: 42.6 %
HEMOGLOBIN: 13.1 G/DL (ref 13.5–17.5)
KETONES, URINE: 15 MG/DL
LACTIC ACID: 1.2 MMOL/L (ref 0.4–2)
LEUKOCYTE ESTERASE, URINE: NEGATIVE
LYMPHOCYTES ABSOLUTE: 1.2 K/UL (ref 1–5.1)
LYMPHOCYTES RELATIVE PERCENT: 15.5 %
MCH RBC QN AUTO: 31.5 PG (ref 26–34)
MCHC RBC AUTO-ENTMCNC: 33.1 G/DL (ref 31–36)
MCV RBC AUTO: 95.1 FL (ref 80–100)
METHEMOGLOBIN VENOUS: 0.3 % (ref 0–1.5)
MICROSCOPIC EXAMINATION: ABNORMAL
MONOCYTES ABSOLUTE: 0.9 K/UL (ref 0–1.3)
MONOCYTES RELATIVE PERCENT: 11.5 %
NEUTROPHILS ABSOLUTE: 5.2 K/UL (ref 1.7–7.7)
NEUTROPHILS RELATIVE PERCENT: 68.9 %
NITRITE, URINE: NEGATIVE
O2 SAT, VEN: 57 %
PCO2, VEN: 48.5 MMHG (ref 41–51)
PDW BLD-RTO: 15.5 % (ref 12.4–15.4)
PH UA: 6 (ref 5–8)
PH VENOUS: 7.37 (ref 7.35–7.45)
PLATELET # BLD: 189 K/UL (ref 135–450)
PMV BLD AUTO: 8.9 FL (ref 5–10.5)
PO2, VEN: 32.4 MMHG (ref 25–40)
POTASSIUM REFLEX MAGNESIUM: 3.7 MMOL/L (ref 3.5–5.1)
PRO-BNP: 140 PG/ML (ref 0–449)
PROTEIN UA: NEGATIVE MG/DL
RBC # BLD: 4.18 M/UL (ref 4.2–5.9)
SODIUM BLD-SCNC: 136 MMOL/L (ref 136–145)
SPECIFIC GRAVITY UA: 1.01 (ref 1–1.03)
TCO2 CALC VENOUS: 29 MMOL/L
TOTAL PROTEIN: 7.2 G/DL (ref 6.4–8.2)
TROPONIN: <0.01 NG/ML
URINE REFLEX TO CULTURE: ABNORMAL
URINE TYPE: ABNORMAL
UROBILINOGEN, URINE: 0.2 E.U./DL
WBC # BLD: 7.6 K/UL (ref 4–11)

## 2021-06-23 PROCEDURE — 71045 X-RAY EXAM CHEST 1 VIEW: CPT

## 2021-06-23 PROCEDURE — 99283 EMERGENCY DEPT VISIT LOW MDM: CPT

## 2021-06-23 PROCEDURE — 70450 CT HEAD/BRAIN W/O DYE: CPT

## 2021-06-23 PROCEDURE — 85025 COMPLETE CBC W/AUTO DIFF WBC: CPT

## 2021-06-23 PROCEDURE — 83880 ASSAY OF NATRIURETIC PEPTIDE: CPT

## 2021-06-23 PROCEDURE — 80048 BASIC METABOLIC PNL TOTAL CA: CPT

## 2021-06-23 PROCEDURE — 84484 ASSAY OF TROPONIN QUANT: CPT

## 2021-06-23 PROCEDURE — 80076 HEPATIC FUNCTION PANEL: CPT

## 2021-06-23 PROCEDURE — 83605 ASSAY OF LACTIC ACID: CPT

## 2021-06-23 PROCEDURE — 82803 BLOOD GASES ANY COMBINATION: CPT

## 2021-06-23 PROCEDURE — 81003 URINALYSIS AUTO W/O SCOPE: CPT

## 2021-06-23 PROCEDURE — 93005 ELECTROCARDIOGRAM TRACING: CPT | Performed by: PHYSICIAN ASSISTANT

## 2021-06-23 ASSESSMENT — ENCOUNTER SYMPTOMS
SHORTNESS OF BREATH: 1
ABDOMINAL PAIN: 0

## 2021-06-23 ASSESSMENT — PAIN - FUNCTIONAL ASSESSMENT: PAIN_FUNCTIONAL_ASSESSMENT: ACTIVITIES ARE NOT PREVENTED

## 2021-06-23 ASSESSMENT — PAIN DESCRIPTION - LOCATION: LOCATION: CHEST

## 2021-06-23 ASSESSMENT — PAIN DESCRIPTION - DESCRIPTORS: DESCRIPTORS: SHARP

## 2021-06-23 ASSESSMENT — PAIN DESCRIPTION - PROGRESSION: CLINICAL_PROGRESSION: GRADUALLY WORSENING

## 2021-06-23 ASSESSMENT — PAIN DESCRIPTION - ONSET: ONSET: PROGRESSIVE

## 2021-06-23 ASSESSMENT — PAIN DESCRIPTION - FREQUENCY: FREQUENCY: CONTINUOUS

## 2021-06-23 ASSESSMENT — PAIN SCALES - GENERAL: PAINLEVEL_OUTOF10: 5

## 2021-06-23 NOTE — ED NOTES
Patient prepared for and ready to be discharged. Patient discharged at this time to home in care of self in no acute distress after verbalizing understanding of discharge instructions. Patient left after receiving After Visit Summary instructions. IV removed prior to discharge.        Neto Mackey RN  06/23/21 0686

## 2021-06-23 NOTE — ED PROVIDER NOTES
ED Attending Attestation Note     Date of evaluation: 6/23/2021    This patient was seen by the advance practice provider. I have seen and examined the patient, agree with the workup, evaluation, management and diagnosis. The care plan has been discussed. I have reviewed the ECG and concur with the ALEJANDRO's interpretation. My assessment reveals an elderly adult male with history of Parkinson's disease who presents to the emergency department with chest pain. Patient with significant comorbidities and risk factors including previous CAD as well as hypertension. He describes left-sided anterior chest pain that is improving since onset earlier today. He denies any shortness of breath or abdominal pain with this. No nausea or vomiting. On examination I find no increased work of breathing or accessory muscle use during respirations. Lungs are clear to auscultation bilaterally. Symmetric pulses in extremities. We will proceed with EKG, laboratory work-up and chest x-ray.     Tonio Stephenson MD MPH   Physician        Keiry Reyna MD  06/23/21 1970

## 2021-06-23 NOTE — ED PROVIDER NOTES
[facility]          PHYSICIAN ASSISTANT NOTE       Date of evaluation: 6/23/2021    Chief Complaint     Chest Pain (x2 days) and Dizziness    History of Present Illness     HPI: Zeferino Limon is a 68 y.o. male with history of CAD, hypertension, HLD, Parkinson's who presents to the emergency department with chest pain. For the past 2 days the patient has felt generally weak and has had intermittent substernal chest pain that typically occurs with walking. On reevaluation the patient also tells me that he has had intermittent chest pain after intercourse as well. He notes that his shortness of breath is unchanged. He denies any associated abdominal pain, nausea or vomiting. He denies any changes in urination or bowel movements. Both he and his wife state that he feels safe at home and that they are capable of handling his increasing needs in the setting of his Parkinson's. Patient also notes that he has had 3 falls over the past several days, does not believe that he hit his head or lost consciousness. He has not anticoagulated. He initially had some right wrist pain after one of the falls but notes that that has improved. With the exception of the above, there are no aggravating or alleviating factors. Review of Systems     Review of Systems   Constitutional: Negative for chills and fever. Respiratory: Positive for shortness of breath. Cardiovascular: Positive for chest pain. Gastrointestinal: Negative for abdominal pain. Neurological: Positive for weakness. Negative for dizziness and headaches. As stated above, all other systems reviewed and are otherwise negative.      Past Medical, Surgical, Family, and Social History     He has a past medical history of Anxiety, Arthritis, Arthritis of hand, right, Sneed esophagus, Sneed esophagus, Bilateral carotid artery stenosis <50%, BPH (benign prostatic hypertrophy), CAD (coronary artery disease), IMIPRAMINE (TOFRANIL) 25 MG TABLET    TAKE 1 TABLET NIGHTLY    LIDOCAINE (LIDODERM) 5 %    Place 1 patch onto the skin daily 12 hours on, 12 hours off. LINACLOTIDE (LINZESS) 290 MCG CAPS CAPSULE    Take 290 mcg by mouth every morning (before breakfast)    MODAFINIL (PROVIGIL) 100 MG TABLET    Take  mg by mouth every morning. PANTOPRAZOLE (PROTONIX) 40 MG TABLET      Take 40 mg by mouth daily     POLYETHYLENE GLYCOL 3350 (MIRALAX PO)      Take 17 g by mouth daily as needed     SIMVASTATIN (ZOCOR) 10 MG TABLET    Take 1 tablet by mouth nightly       Allergies     He is allergic to chlorpromazine, haloperidol lactate, metoclopramide, prochlorperazine, and levofloxacin. Physical Exam     INITIAL VITALS: BP: (!) 169/87, Temp: 98.8 °F (37.1 °C), Pulse: 63, Resp: 22, SpO2: 100 %  Physical Exam  Constitutional:       General: He is not in acute distress. Appearance: Normal appearance. He is normal weight. He is not ill-appearing, toxic-appearing or diaphoretic. Comments: Elderly gentleman with tremor sitting up in bed. HENT:      Head: Normocephalic and atraumatic. Comments: No evidence of external trauma on the patient's head. No raccoon eyes. No periorbital swelling bilaterally. Right Ear: External ear normal.      Left Ear: External ear normal.      Nose: Nose normal.      Mouth/Throat:      Mouth: Mucous membranes are moist.      Pharynx: Oropharynx is clear. Eyes:      Extraocular Movements: Extraocular movements intact. Conjunctiva/sclera: Conjunctivae normal.   Neck:      Comments: No midline cervical spinal tenderness to palpation. Cardiovascular:      Rate and Rhythm: Normal rate. Pulses: Normal pulses. Pulmonary:      Effort: Pulmonary effort is normal.      Comments: No reproducible chest wall tenderness to palpation. Chest:      Chest wall: No tenderness. Abdominal:      General: Abdomen is flat. There is no distension. Palpations: Abdomen is soft. Tenderness: There is no abdominal tenderness. There is no guarding or rebound. Musculoskeletal:      Cervical back: Normal range of motion. Neurological:      Mental Status: He is alert. Diagnostic Results     EKG   Interpreted in conjunction with emergency department physician No att. providers found  Rhythm: normal sinus   Rate: normal  Axis: normal  : none  Conduction: normal  ST Segments: no acute change  T Waves: no acute change  Q Waves:none  Clinical Impression: no acute changes  Comparison:  5/24/2021    RADIOLOGY:  XR CHEST PORTABLE   Final Result      Mild streaky opacities in the left lung base which are nonspecific. CT Head WO Contrast   Final Result      No evidence of acute intracranial abnormality.                          LABS:   Results for orders placed or performed during the hospital encounter of 06/23/21   CBC Auto Differential   Result Value Ref Range    WBC 7.6 4.0 - 11.0 K/uL    RBC 4.18 (L) 4.20 - 5.90 M/uL    Hemoglobin 13.1 (L) 13.5 - 17.5 g/dL    Hematocrit 39.7 (L) 40.5 - 52.5 %    MCV 95.1 80.0 - 100.0 fL    MCH 31.5 26.0 - 34.0 pg    MCHC 33.1 31.0 - 36.0 g/dL    RDW 15.5 (H) 12.4 - 15.4 %    Platelets 736 331 - 055 K/uL    MPV 8.9 5.0 - 10.5 fL    Neutrophils % 68.9 %    Lymphocytes % 15.5 %    Monocytes % 11.5 %    Eosinophils % 3.8 %    Basophils % 0.3 %    Neutrophils Absolute 5.2 1.7 - 7.7 K/uL    Lymphocytes Absolute 1.2 1.0 - 5.1 K/uL    Monocytes Absolute 0.9 0.0 - 1.3 K/uL    Eosinophils Absolute 0.3 0.0 - 0.6 K/uL    Basophils Absolute 0.0 0.0 - 0.2 K/uL   Basic Metabolic Panel w/ Reflex to MG   Result Value Ref Range    Sodium 136 136 - 145 mmol/L    Potassium reflex Magnesium 3.7 3.5 - 5.1 mmol/L    Chloride 100 99 - 110 mmol/L    CO2 25 21 - 32 mmol/L    Anion Gap 11 3 - 16    Glucose 101 (H) 70 - 99 mg/dL    BUN 20 7 - 20 mg/dL    CREATININE 0.9 0.8 - 1.3 mg/dL    GFR Non-African American >60 >60    GFR African American >60 >60    Calcium 9.3 8.3 - 10.6 mg/dL   Hepatic Function Panel   Result Value Ref Range    Total Protein 7.2 6.4 - 8.2 g/dL    Albumin 4.2 3.4 - 5.0 g/dL    Alkaline Phosphatase 80 40 - 129 U/L    ALT <5 (L) 10 - 40 U/L    AST 19 15 - 37 U/L    Total Bilirubin 0.5 0.0 - 1.0 mg/dL    Bilirubin, Direct <0.2 0.0 - 0.3 mg/dL    Bilirubin, Indirect see below 0.0 - 1.0 mg/dL   Troponin   Result Value Ref Range    Troponin <0.01 <0.01 ng/mL   Brain Natriuretic Peptide   Result Value Ref Range    Pro- 0 - 449 pg/mL   Urinalysis Reflex to Culture    Specimen: Urine, clean catch   Result Value Ref Range    Color, UA Yellow Straw/Yellow    Clarity, UA Clear Clear    Glucose, Ur Negative Negative mg/dL    Bilirubin Urine Negative Negative    Ketones, Urine 15 (A) Negative mg/dL    Specific Gravity, UA 1.015 1.005 - 1.030    Blood, Urine Negative Negative    pH, UA 6.0 5.0 - 8.0    Protein, UA Negative Negative mg/dL    Urobilinogen, Urine 0.2 <2.0 E.U./dL    Nitrite, Urine Negative Negative    Leukocyte Esterase, Urine Negative Negative    Microscopic Examination Not Indicated     Urine Type NotGiven     Urine Reflex to Culture Not Indicated    Blood Gas, Venous   Result Value Ref Range    pH, Pablo 7.368 7.350 - 7.450    pCO2, Pablo 48.5 41.0 - 51.0 mmHg    pO2, Pablo 32.4 25 - 40 mmHg    HCO3, Venous 27.9 24.0 - 28.0 mmol/L    Base Excess, Pablo 1.8 -2.0 - 3.0 mmol/L    O2 Sat, Pablo 57 Not established %    Carboxyhemoglobin 1.0 0.0 - 1.5 %    MetHgb, Pablo 0.3 0.0 - 1.5 %    TC02 (Calc), Pablo 29 mmol/L    Hemoglobin, Pablo, Reduced 42.60 %   Lactic Acid, Plasma   Result Value Ref Range    Lactic Acid 1.2 0.4 - 2.0 mmol/L   EKG 12 Lead   Result Value Ref Range    Ventricular Rate 60 BPM    Atrial Rate 60 BPM    P-R Interval 106 ms    QRS Duration 88 ms    Q-T Interval 418 ms    QTc Calculation (Bazett) 418 ms    P Axis 79 degrees    R Axis 16 degrees    T Axis 41 degrees    Diagnosis       EKG performed in ER and to be interpreted by ER physician. Confirmed by MD, ER ([id number]),  Kelley Pacheco ([phone]) on 6/23/2021 4:48:30 PM       RECENT VITALS:  BP: (!) 154/68, Temp: 98.8 °F (37.1 °C), Pulse: 71, Resp: 20, SpO2: 97 %     Procedures     n/a    ED Course     Nursing Notes, Past Medical Hx,Past Surgical Hx, Social Hx, Allergies, and Family Hx were reviewed. The patient was given the following medications:  No orders of the defined types were placed in this encounter. CONSULTS:  [facility],[address] / ASSESSMENT / PLAN     Vitals:    06/23/21 1632 06/23/21 1858   BP: (!) 169/87 (!) 154/68   Pulse: 63 71   Resp: 22 20   Temp: 98.8 °F (37.1 °C)    TempSrc: Oral    SpO2: 100% 97%   Weight: 165 lb (74.8 kg)    Height: 5' 8\" (1.727 m)      Oscar Nelson is a 68 y.o. male who presents to the emergency department with chest pain. Patient for the past 2 days has had intermittent exertional chest pain. His shortness of breath is at his baseline. Patient also tells me that he has had pain after sexual intercourse as well. He has had 3 falls within the past several days, is unsure if he hit his head. Patient also notes that he has felt generally weak without any focal weakness. His wife and he both note that he still feels safe at home. The patient has remained hemodynamically stable throughout their stay in the emergency department, and appears well overall. Given patient's unwitnessed falls with unclear history of whether or not he hit his head CT scan was obtained of his head that did not show any acute abnormalities. Regarding patient's chest pain his EKG is nonischemic and unchanged, with a negative troponin here. Patient's other labs are reassuring as well in regards to his generalized weakness, without any focal pneumonia appreciable on chest x-ray and no infection on urinalysis. I did offer admission versus discharge with the patient and his wife, who would like to opt for the plan for discharge if appropriate.   I did speak with the cardiologist on-call for the patient's group who feels comfortable with the plan for discharge, as the patient's wife was able to facilitate a follow-up appointment tomorrow afternoon with the patient's cardiologist.  All this was discussed with the patient and his wife who feel comfortable the plan for discharge home with strict return precautions and close follow-up. The patient was evaluated by myself and the ED Attending Physician, Dr. Juancarlos Montero. All management and disposition plans were discussed and agreed upon. Clinical Impression     1. Chest pain, unspecified type        This note was dictated using voice-recognition software, which occasionally leads to inadvertent typographic errors.     Disposition     PATIENT REFERRED TO:  Jodie Ly MD  [address]  [phone]    On 6/24/2021  at 2:00pm      DISCHARGE MEDICATIONS:  New Prescriptions    No medications on file       DISPOSITION Decision To Discharge 06/23/2021 06:36:14 PM     [place], [place]  06/23/21 4058

## 2021-06-24 ENCOUNTER — TELEPHONE (OUTPATIENT)
Dept: CARDIOLOGY CLINIC | Age: 76
End: 2021-06-24

## 2021-06-24 DIAGNOSIS — R42 DIZZINESS: ICD-10-CM

## 2021-06-24 DIAGNOSIS — R07.9 CHEST PAIN, UNSPECIFIED TYPE: Primary | ICD-10-CM

## 2021-06-24 NOTE — TELEPHONE ENCOUNTER
Patient's wife called. He went to [facility] ER yesterday and is back home now. While at ER they told him he should have stress test done. Does Dr. Marcela Boudreaux want to order a stress test? Please call wife, Miguel Ángel Ibanez about this at [phone]. Thanks.

## 2021-06-29 ENCOUNTER — TELEPHONE (OUTPATIENT)
Dept: FAMILY MEDICINE CLINIC | Age: 76
End: 2021-06-29

## 2021-06-29 NOTE — TELEPHONE ENCOUNTER
----- Message from Jacobo Kandice sent at 6/28/2021  4:36 PM EDT -----  Subject: Appointment Request    Reason for Call: Routine ED Follow Up Visit    QUESTIONS  Type of Appointment? Established Patient  Reason for appointment request? Available appointments did not meet   patient need  Additional Information for Provider? Patient needs a followup appt for ED   f/u. Seen in [facility] ED on 06/23. No appts available with Dr. Marely Magana until   August. Please call patient or patient's spouse, Josias Ramsay, to schedule this   f/u appt.   ---------------------------------------------------------------------------  --------------  CALL BACK INFO  What is the best way for the office to contact you? OK to leave message on   voicemail  Preferred Call Back Phone Number? [phone]  ---------------------------------------------------------------------------  --------------  SCRIPT ANSWERS  Relationship to Patient? Other  Representative Name? Supriya Quiroga - spouse  Additional information verified (besides Name and Date of Birth)? Address  Appointment reason? Well Care/Follow Ups  Select a Well Care/Follow Ups appointment reason? Adult ED Follow Up   [Emergency Room, Emergency Department]  (Patient requests to see provider urgently. )? No  (Patient requests to see provider urgently. )? No  Do you have any questions for your primary care provider that need to be   answered prior to your appointment? No  Have you been diagnosed with, awaiting test results for, or told that you   are suspected of having COVID-19 (Coronavirus)? (If patient has tested   negative or was tested as a requirement for work, school, or travel and   not based on symptoms, answer no)? No  Do you currently have flu-like symptoms including fever or chills, cough,   shortness of breath, difficulty breathing, or new loss of taste or smell? No  Have you had close contact with someone with COVID-19 in the last 14 days?    No  (Service Expert  click yes below to proceed with [employer]ual   Scheduling)?  Yes

## 2021-06-30 ENCOUNTER — HOSPITAL ENCOUNTER (OUTPATIENT)
Dept: NON INVASIVE DIAGNOSTICS | Age: 76
Discharge: HOME | End: 2021-06-30
Payer: MEDICARE

## 2021-06-30 DIAGNOSIS — R42 DIZZINESS: ICD-10-CM

## 2021-06-30 DIAGNOSIS — R07.9 CHEST PAIN, UNSPECIFIED TYPE: ICD-10-CM

## 2021-06-30 LAB
LV EF: 75 %
LVEF MODALITY: NORMAL

## 2021-06-30 PROCEDURE — 78452 HT MUSCLE IMAGE SPECT MULT: CPT

## 2021-06-30 PROCEDURE — 6360000002 HC RX W HCPCS: Performed by: INTERNAL MEDICINE

## 2021-06-30 PROCEDURE — 3430000000 HC RX DIAGNOSTIC RADIOPHARMACEUTICAL: Performed by: INTERNAL MEDICINE

## 2021-06-30 PROCEDURE — 2580000003 HC RX 258: Performed by: INTERNAL MEDICINE

## 2021-06-30 PROCEDURE — A9502 TC99M TETROFOSMIN: HCPCS | Performed by: INTERNAL MEDICINE

## 2021-06-30 PROCEDURE — 93017 CV STRESS TEST TRACING ONLY: CPT

## 2021-06-30 RX ORDER — SODIUM CHLORIDE 0.9 % (FLUSH) 0.9 %
10 SYRINGE (ML) INJECTION PRN
Status: DISCONTINUED | OUTPATIENT
Start: 2021-06-30 | End: 2021-07-01 | Stop reason: HOSPADM

## 2021-06-30 RX ADMIN — REGADENOSON 0.4 MG: 0.08 INJECTION, SOLUTION INTRAVENOUS at 15:13

## 2021-06-30 RX ADMIN — TETROFOSMIN 10 MILLICURIE: 1.38 INJECTION, POWDER, LYOPHILIZED, FOR SOLUTION INTRAVENOUS at 13:52

## 2021-06-30 RX ADMIN — Medication 10 ML: at 13:51

## 2021-06-30 RX ADMIN — TETROFOSMIN 30 MILLICURIE: 1.38 INJECTION, POWDER, LYOPHILIZED, FOR SOLUTION INTRAVENOUS at 15:13

## 2021-06-30 RX ADMIN — Medication 10 ML: at 15:13

## 2021-07-02 ENCOUNTER — OFFICE VISIT (OUTPATIENT)
Dept: INTERNAL MEDICINE CLINIC | Age: 76
End: 2021-07-02
Payer: MEDICARE

## 2021-07-02 VITALS
DIASTOLIC BLOOD PRESSURE: 74 MMHG | WEIGHT: 160 LBS | HEART RATE: 97 BPM | BODY MASS INDEX: 24.33 KG/M2 | OXYGEN SATURATION: 97 % | SYSTOLIC BLOOD PRESSURE: 130 MMHG

## 2021-07-02 DIAGNOSIS — R29.6 FREQUENT FALLS: ICD-10-CM

## 2021-07-02 DIAGNOSIS — F41.9 ANXIETY: ICD-10-CM

## 2021-07-02 DIAGNOSIS — Z91.81 AT HIGH RISK FOR FALLS: Primary | ICD-10-CM

## 2021-07-02 DIAGNOSIS — R07.89 OTHER CHEST PAIN: ICD-10-CM

## 2021-07-02 DIAGNOSIS — F33.0 MILD RECURRENT MAJOR DEPRESSION (HCC): ICD-10-CM

## 2021-07-02 PROCEDURE — 99214 OFFICE O/P EST MOD 30 MIN: CPT | Performed by: NURSE PRACTITIONER

## 2021-07-02 PROCEDURE — 4040F PNEUMOC VAC/ADMIN/RCVD: CPT | Performed by: NURSE PRACTITIONER

## 2021-07-02 PROCEDURE — 1036F TOBACCO NON-USER: CPT | Performed by: NURSE PRACTITIONER

## 2021-07-02 PROCEDURE — G8427 DOCREV CUR MEDS BY ELIG CLIN: HCPCS | Performed by: NURSE PRACTITIONER

## 2021-07-02 PROCEDURE — 1123F ACP DISCUSS/DSCN MKR DOCD: CPT | Performed by: NURSE PRACTITIONER

## 2021-07-02 PROCEDURE — G8420 CALC BMI NORM PARAMETERS: HCPCS | Performed by: NURSE PRACTITIONER

## 2021-07-02 RX ORDER — IMIPRAMINE HCL 25 MG
TABLET ORAL
Qty: 90 TABLET | Refills: 0 | Status: SHIPPED | OUTPATIENT
Start: 2021-07-02 | End: 2021-09-14

## 2021-07-02 SDOH — ECONOMIC STABILITY: FOOD INSECURITY: WITHIN THE PAST 12 MONTHS, THE FOOD YOU BOUGHT JUST DIDN'T LAST AND YOU DIDN'T HAVE MONEY TO GET MORE.: NEVER TRUE

## 2021-07-02 SDOH — ECONOMIC STABILITY: TRANSPORTATION INSECURITY
IN THE PAST 12 MONTHS, HAS THE LACK OF TRANSPORTATION KEPT YOU FROM MEDICAL APPOINTMENTS OR FROM GETTING MEDICATIONS?: NO

## 2021-07-02 SDOH — ECONOMIC STABILITY: TRANSPORTATION INSECURITY
IN THE PAST 12 MONTHS, HAS LACK OF TRANSPORTATION KEPT YOU FROM MEETINGS, WORK, OR FROM GETTING THINGS NEEDED FOR DAILY LIVING?: NO

## 2021-07-02 SDOH — ECONOMIC STABILITY: FOOD INSECURITY: WITHIN THE PAST 12 MONTHS, YOU WORRIED THAT YOUR FOOD WOULD RUN OUT BEFORE YOU GOT MONEY TO BUY MORE.: NEVER TRUE

## 2021-07-02 ASSESSMENT — ENCOUNTER SYMPTOMS
SHORTNESS OF BREATH: 0
ABDOMINAL PAIN: 0
SINUS PRESSURE: 0
BACK PAIN: 0
SINUS PAIN: 0
WHEEZING: 0
COLOR CHANGE: 0
CONSTIPATION: 0
COUGH: 0
DIARRHEA: 0

## 2021-07-02 ASSESSMENT — SOCIAL DETERMINANTS OF HEALTH (SDOH): HOW HARD IS IT FOR YOU TO PAY FOR THE VERY BASICS LIKE FOOD, HOUSING, MEDICAL CARE, AND HEATING?: NOT HARD AT ALL

## 2021-07-02 NOTE — ASSESSMENT & PLAN NOTE
Suggest using walker more often  Agree with physical therapy   Following with [facility] and neurology

## 2021-07-02 NOTE — PROGRESS NOTES
tablet 3    donepezil (ARICEPT) 5 MG tablet Take 5 mg by mouth nightly      linaclotide (LINZESS) 290 MCG CAPS capsule Take 290 mcg by mouth every morning (before breakfast)      clonazePAM (KLONOPIN) 0.5 MG tablet Take 1 mg by mouth nightly       carbidopa-levodopa (SINEMET)  MG per tablet Take 3 tablets by mouth 4 times daily Take 3 tablets at 0800, 1200, 1600 and 2000.  Cholecalciferol (VITAMIN D3) 2000 UNITS CAPS   Take 1 capsule by mouth daily       aspirin 81 MG EC tablet   Take 81 mg by mouth three times a week On Mondays, Wednesdays and Fridays      Polyethylene Glycol 3350 (MIRALAX PO)   Take 17 g by mouth daily as needed       pantoprazole (PROTONIX) 40 MG tablet   Take 40 mg by mouth daily        No current facility-administered medications for this visit. Review of Systems   Constitutional: Negative for chills, fatigue and fever. HENT: Negative for congestion, sinus pressure and sinus pain. Respiratory: Negative for cough, shortness of breath and wheezing. Cardiovascular: Negative for chest pain and palpitations. Gastrointestinal: Negative for abdominal pain, constipation and diarrhea. Musculoskeletal: Positive for gait problem. Negative for arthralgias, back pain and myalgias. Skin: Negative for color change, pallor and rash. Abrasions to knees   Neurological: Positive for weakness (generalized). Negative for dizziness, syncope, light-headedness and headaches. Psychiatric/Behavioral: Negative for behavioral problems, confusion and sleep disturbance. The patient is not nervous/anxious. Vitals:    07/02/21 1316   BP: 130/74   Site: Left Upper Arm   Position: Sitting   Cuff Size: Medium Adult   Pulse: 97   SpO2: 97%   Weight: 160 lb (72.6 kg)       Physical Exam  Constitutional:       Appearance: He is well-developed. HENT:      Head: Normocephalic and atraumatic.    Eyes:      Conjunctiva/sclera: Conjunctivae normal.      Pupils: Pupils are equal, round, and reactive to light. Neck:      Thyroid: No thyromegaly. Vascular: No JVD. Cardiovascular:      Rate and Rhythm: Normal rate and regular rhythm. Heart sounds: Normal heart sounds. Pulmonary:      Effort: Pulmonary effort is normal. No respiratory distress. Breath sounds: Normal breath sounds. No wheezing. Musculoskeletal:         General: No deformity. Normal range of motion. Cervical back: Normal range of motion and neck supple. Skin:     General: Skin is warm and dry. Capillary Refill: Capillary refill takes less than 2 seconds. Findings: Abrasion present. Comments: Multiple abrasions to knees and shins. Healing well. Neurological:      Mental Status: He is alert and oriented to person, place, and time. Gait: Gait abnormal (shuffling). Psychiatric:         Behavior: Behavior normal.         An electronic signature was used to authenticate this note. --ROGE Singletary - CNP On the basis of positive falls risk screening, assessment and plan is as follows: home safety tips provided, PT scheduled .

## 2021-07-22 ENCOUNTER — OFFICE VISIT (OUTPATIENT)
Dept: CARDIOLOGY CLINIC | Age: 76
End: 2021-07-22
Payer: MEDICARE

## 2021-07-22 VITALS
HEART RATE: 72 BPM | WEIGHT: 159 LBS | DIASTOLIC BLOOD PRESSURE: 80 MMHG | BODY MASS INDEX: 24.18 KG/M2 | SYSTOLIC BLOOD PRESSURE: 130 MMHG

## 2021-07-22 DIAGNOSIS — I25.10 CAD IN NATIVE ARTERY: ICD-10-CM

## 2021-07-22 DIAGNOSIS — I10 ESSENTIAL HYPERTENSION: ICD-10-CM

## 2021-07-22 DIAGNOSIS — R07.9 CHEST PAIN, UNSPECIFIED TYPE: Primary | ICD-10-CM

## 2021-07-22 PROCEDURE — 99213 OFFICE O/P EST LOW 20 MIN: CPT | Performed by: INTERNAL MEDICINE

## 2021-07-22 PROCEDURE — 1036F TOBACCO NON-USER: CPT | Performed by: INTERNAL MEDICINE

## 2021-07-22 PROCEDURE — G8420 CALC BMI NORM PARAMETERS: HCPCS | Performed by: INTERNAL MEDICINE

## 2021-07-22 PROCEDURE — 1123F ACP DISCUSS/DSCN MKR DOCD: CPT | Performed by: INTERNAL MEDICINE

## 2021-07-22 PROCEDURE — 4040F PNEUMOC VAC/ADMIN/RCVD: CPT | Performed by: INTERNAL MEDICINE

## 2021-07-22 PROCEDURE — G8427 DOCREV CUR MEDS BY ELIG CLIN: HCPCS | Performed by: INTERNAL MEDICINE

## 2021-07-22 NOTE — PROGRESS NOTES
Subjective:      Patient ID: Keith Tsang is a 68 y.o. male. [facility] Fernando Petty is being seen for  follow up CAD/angina/HTN. Bothered by [employer]. Episodes of dizziness. Usually when getting up. Some PINA. No chest pain. No tachycardia. No syncope. No pnd or orthopnea. No edema. Wt stable. BP at times [phone]  Systolic. Kylah Eli Past Medical History:   Diagnosis Date    Anxiety     PCP in [place]:Dr. Rashida Gutierrez.    Arthritis     Arthritis of hand, right 6/20/2012    Sneed esophagus     Dr. Lamine Valdez. EGD:1/17/2012. Next EGD due in 3yrs=1/2015    Sneed esophagus     Under care of GI    Bilateral carotid artery stenosis <50% 4/30/2014    BPH (benign prostatic hypertrophy)     Dr. Nacho Umaña. PSA per urology.     CAD (coronary artery disease)     under care of cards:Dr. Ayana Sanchez    Carotid stenosis     Chest pain     pt states he no chest pain in 5 yrs, panic attack    Chronic back pain 1/26/2012    Chronic back pain     under care of ortho spine:Dr. Sweeney    Constipation 8/6/2013    Degenerative arthritis of thoracic spine 1/26/2012    Degenerative cervical disc 1/26/2012    Depression with anxiety 11/15/2011    Klonopin per neurologist(Dr. Elizabeth Alvarado)    Diverticulosis 1/17/2012    colonoscopy    Elevated PSA     8/21/14;5/2013:under care of Dr. Beltran/[place]:Urology group    Erectile dysfunction 6/13/2017    Essential hypertension, benign 6/20/2012    cardiologist:Dr. Jen Ordaz    GERD (gastroesophageal reflux disease)     Hemorrhoid     Hiatal hernia     small    Hyperlipidemia     Impaired fasting glucose 5/19/2014    Osteopenia per CXR 5/19/2014    Parkinson disease ([facility] [address]     Dr. Caron Jay Neurology    Renal stone 4/2012    4 mm distal left ureteral calculus:Dr. Beltran:urologist    RLS (restless legs syndrome)     S/P colonoscopy 2011;5/20/19    Dr. Elenita Diana per pt' next is in 10yrs-2021. 5/20/19(Dr. Aggie Dalal)    S/P endoscopy 1/2012    Dr. Elvira Amaya changes:+Barrets:next in 3yrs 1/2015    Sigmoidoscopy exam 1/17/2012    Dr. Lama:No acute changes. Next in 5yrs=1/2017    Therapeutic drug monitoring 5/14/15    Consistent OARRS report on 5/14/15;8/4/15    Thoracic spondylosis     under care of ortho spine:Dr. Jerry Baker    Venous insufficiency of leg     Vocal cord paresis 5/11/2016    under care of ENT:Dr. Judie Pérez. s/p vocal cord augmentation 6/2016 at [facility]     Past Surgical History:   Procedure Laterality Date    CATARACT REMOVAL      COLONOSCOPY  2002;2011    JOINT REPLACEMENT Right 02/23/2017    Right TKR(knee)    KNEE ARTHROPLASTY Left 7/30/13    LEFT TOTAL KNEE ARTHROPLASTY              KNEE CARTILAGE SURGERY      Left x 2, right x1    LITHOTRIPSY      Kidney stone removal as per urology:Stent removed after 10days    OSTEOTOMY Left     TURP N/A [id number]    TRANSURETHRAL RESECTION OF PROSTATE    UPPER GASTROINTESTINAL ENDOSCOPY  5/07       Allergies   Allergen Reactions    Chlorpromazine Other (See Comments)    Haloperidol Lactate Other (See Comments)    Metoclopramide Other (See Comments)    Prochlorperazine Other (See Comments)    Promethazine Other (See Comments)    Promethazine Hcl Other (See Comments)    Sulfa Antibiotics Other (See Comments)    Levofloxacin Swelling     lips swell        Social History     Socioeconomic History    Marital status:      Spouse name: Not on file    Number of children: 2    Years of education: Not on file    Highest education level: Not on file   Occupational History    Occupation: retired    Tobacco Use    Smoking status: Never Smoker    Smokeless tobacco: Never Used   Vaping Use    Vaping Use: Former   Substance and Sexual Activity    Alcohol use:  Yes     Alcohol/week: 0.0 standard drinks     Comment: occasionally    Drug use: No    Sexual activity: Yes     Partners: Female   Other Topics Concern    Not on file   Social History Narrative    Not on file     Social aspirin 81 MG EC tablet   Take 81 mg by mouth three times a week On Mondays, Wednesdays and Fridays      Polyethylene Glycol 3350 (MIRALAX PO)   Take 17 g by mouth daily as needed       pantoprazole (PROTONIX) 40 MG tablet   Take 40 mg by mouth daily        No current facility-administered medications for this visit. Vitals:    07/22/21 0928   BP: 130/80   Pulse: 72       Wt 160    Review of Systems   Constitutional: Negative for activity change. Some  fatigue. Respiratory: Negative for apnea, cough, choking, Has chest tightness and shortness of breath. Cardiovascular: Negative for chest pain, palpitations and leg swelling. No PND or orthopnea. No tachycardia. Gastrointestinal: Negative for abdominal distention. Musculoskeletal: Negative for myalgias. Neurological: Negative for light-headedness. Negative for dizziness and syncope. Psychiatric/Behavioral: Negative for behavioral problems, confusion and agitation. All other systems reviewed negative as done. Objective:   Physical Exam   Constitutional: He is oriented to person, place, and time. He appears well-developed and well-nourished. No distress. HENT:   Head: Normocephalic and atraumatic. Eyes: Conjunctivae and EOM are normal. Right eye exhibits no discharge. Left eye exhibits no discharge. Neck: Normal range of motion. Neck supple. No JVD present. Cardiovascular: Normal rate, regular rhythm, S1 normal, S2 normal and normal heart sounds. Exam reveals no gallop. No murmur heard. Pulses:       Radial pulses are 2+ on the right side, and 2+ on the left side. Pulmonary/Chest: Effort normal and breath sounds normal. No respiratory distress. He has no wheezes. He has no rales. Abdominal: Soft. Bowel sounds are normal. There is no tenderness. Musculoskeletal: Normal range of motion. He exhibits no edema. Neurological: He is alert and oriented to person, place, and time. Skin: Skin is warm and dry.    Psychiatric:

## 2021-08-12 ENCOUNTER — APPOINTMENT (OUTPATIENT)
Dept: GENERAL RADIOLOGY | Age: 76
DRG: 057 | End: 2021-08-12
Payer: MEDICARE

## 2021-08-12 ENCOUNTER — APPOINTMENT (OUTPATIENT)
Dept: CT IMAGING | Age: 76
DRG: 057 | End: 2021-08-12
Payer: MEDICARE

## 2021-08-12 ENCOUNTER — HOSPITAL ENCOUNTER (INPATIENT)
Age: 76
LOS: 3 days | Discharge: HOME | DRG: 057 | End: 2021-08-15
Attending: EMERGENCY MEDICINE | Admitting: INTERNAL MEDICINE
Payer: MEDICARE

## 2021-08-12 DIAGNOSIS — R26.2 UNABLE TO AMBULATE: ICD-10-CM

## 2021-08-12 DIAGNOSIS — R53.1 GENERALIZED WEAKNESS: Primary | ICD-10-CM

## 2021-08-12 LAB
ANION GAP SERPL CALCULATED.3IONS-SCNC: 8 MMOL/L (ref 3–16)
BASOPHILS ABSOLUTE: 0 K/UL (ref 0–0.2)
BASOPHILS RELATIVE PERCENT: 0.3 %
BILIRUBIN URINE: NEGATIVE
BLOOD, URINE: NEGATIVE
BUN BLDV-MCNC: 11 MG/DL (ref 7–20)
CALCIUM SERPL-MCNC: 10.1 MG/DL (ref 8.3–10.6)
CHLORIDE BLD-SCNC: 97 MMOL/L (ref 99–110)
CLARITY: CLEAR
CO2: 28 MMOL/L (ref 21–32)
COLOR: YELLOW
CREAT SERPL-MCNC: 0.8 MG/DL (ref 0.8–1.3)
EKG ATRIAL RATE: 63 BPM
EKG DIAGNOSIS: NORMAL
EKG P AXIS: 27 DEGREES
EKG P-R INTERVAL: 116 MS
EKG Q-T INTERVAL: 420 MS
EKG QRS DURATION: 80 MS
EKG QTC CALCULATION (BAZETT): 429 MS
EKG R AXIS: 13 DEGREES
EKG T AXIS: 53 DEGREES
EKG VENTRICULAR RATE: 63 BPM
EOSINOPHILS ABSOLUTE: 0.1 K/UL (ref 0–0.6)
EOSINOPHILS RELATIVE PERCENT: 0.8 %
GFR AFRICAN AMERICAN: >60
GFR NON-AFRICAN AMERICAN: >60
GLUCOSE BLD-MCNC: 116 MG/DL (ref 70–99)
GLUCOSE URINE: NEGATIVE MG/DL
HCT VFR BLD CALC: 43.5 % (ref 40.5–52.5)
HEMOGLOBIN: 14.4 G/DL (ref 13.5–17.5)
INR BLD: 1.03 (ref 0.88–1.12)
KETONES, URINE: 15 MG/DL
LEUKOCYTE ESTERASE, URINE: NEGATIVE
LYMPHOCYTES ABSOLUTE: 1 K/UL (ref 1–5.1)
LYMPHOCYTES RELATIVE PERCENT: 10.9 %
MCH RBC QN AUTO: 31.8 PG (ref 26–34)
MCHC RBC AUTO-ENTMCNC: 33.1 G/DL (ref 31–36)
MCV RBC AUTO: 96.1 FL (ref 80–100)
MICROSCOPIC EXAMINATION: ABNORMAL
MONOCYTES ABSOLUTE: 0.8 K/UL (ref 0–1.3)
MONOCYTES RELATIVE PERCENT: 9.3 %
NEUTROPHILS ABSOLUTE: 6.9 K/UL (ref 1.7–7.7)
NEUTROPHILS RELATIVE PERCENT: 78.7 %
NITRITE, URINE: NEGATIVE
PDW BLD-RTO: 14.8 % (ref 12.4–15.4)
PH UA: 6.5 (ref 5–8)
PLATELET # BLD: 220 K/UL (ref 135–450)
PMV BLD AUTO: 8.6 FL (ref 5–10.5)
POTASSIUM REFLEX MAGNESIUM: 3.8 MMOL/L (ref 3.5–5.1)
PROTEIN UA: NEGATIVE MG/DL
PROTHROMBIN TIME: 11.7 SEC (ref 9.9–12.7)
RBC # BLD: 4.53 M/UL (ref 4.2–5.9)
SODIUM BLD-SCNC: 133 MMOL/L (ref 136–145)
SODIUM URINE: 45 MMOL/L
SPECIFIC GRAVITY UA: 1.01 (ref 1–1.03)
TROPONIN: <0.01 NG/ML
URINE TYPE: ABNORMAL
UROBILINOGEN, URINE: 0.2 E.U./DL
WBC # BLD: 8.8 K/UL (ref 4–11)

## 2021-08-12 PROCEDURE — 6370000000 HC RX 637 (ALT 250 FOR IP): Performed by: STUDENT IN AN ORGANIZED HEALTH CARE EDUCATION/TRAINING PROGRAM

## 2021-08-12 PROCEDURE — 71046 X-RAY EXAM CHEST 2 VIEWS: CPT

## 2021-08-12 PROCEDURE — 83930 ASSAY OF BLOOD OSMOLALITY: CPT

## 2021-08-12 PROCEDURE — 80048 BASIC METABOLIC PNL TOTAL CA: CPT

## 2021-08-12 PROCEDURE — 99284 EMERGENCY DEPT VISIT MOD MDM: CPT

## 2021-08-12 PROCEDURE — 93005 ELECTROCARDIOGRAM TRACING: CPT | Performed by: NURSE PRACTITIONER

## 2021-08-12 PROCEDURE — 81003 URINALYSIS AUTO W/O SCOPE: CPT

## 2021-08-12 PROCEDURE — 93005 ELECTROCARDIOGRAM TRACING: CPT | Performed by: EMERGENCY MEDICINE

## 2021-08-12 PROCEDURE — 2060000000 HC ICU INTERMEDIATE R&B

## 2021-08-12 PROCEDURE — 70450 CT HEAD/BRAIN W/O DYE: CPT

## 2021-08-12 PROCEDURE — 85610 PROTHROMBIN TIME: CPT

## 2021-08-12 PROCEDURE — 2580000003 HC RX 258: Performed by: STUDENT IN AN ORGANIZED HEALTH CARE EDUCATION/TRAINING PROGRAM

## 2021-08-12 PROCEDURE — 84300 ASSAY OF URINE SODIUM: CPT

## 2021-08-12 PROCEDURE — 84484 ASSAY OF TROPONIN QUANT: CPT

## 2021-08-12 PROCEDURE — 2580000003 HC RX 258: Performed by: NURSE PRACTITIONER

## 2021-08-12 PROCEDURE — 6370000000 HC RX 637 (ALT 250 FOR IP): Performed by: NURSE PRACTITIONER

## 2021-08-12 PROCEDURE — 36415 COLL VENOUS BLD VENIPUNCTURE: CPT

## 2021-08-12 PROCEDURE — 83935 ASSAY OF URINE OSMOLALITY: CPT

## 2021-08-12 PROCEDURE — 85025 COMPLETE CBC W/AUTO DIFF WBC: CPT

## 2021-08-12 RX ORDER — SODIUM CHLORIDE 9 MG/ML
25 INJECTION, SOLUTION INTRAVENOUS PRN
Status: DISCONTINUED | OUTPATIENT
Start: 2021-08-12 | End: 2021-08-15 | Stop reason: HOSPADM

## 2021-08-12 RX ORDER — DONEPEZIL HYDROCHLORIDE 5 MG/1
5 TABLET, FILM COATED ORAL NIGHTLY
Status: DISCONTINUED | OUTPATIENT
Start: 2021-08-12 | End: 2021-08-15 | Stop reason: HOSPADM

## 2021-08-12 RX ORDER — PANTOPRAZOLE SODIUM 40 MG/1
40 TABLET, DELAYED RELEASE ORAL DAILY
Status: DISCONTINUED | OUTPATIENT
Start: 2021-08-12 | End: 2021-08-15 | Stop reason: HOSPADM

## 2021-08-12 RX ORDER — SODIUM CHLORIDE 9 MG/ML
INJECTION, SOLUTION INTRAVENOUS CONTINUOUS
Status: DISCONTINUED | OUTPATIENT
Start: 2021-08-12 | End: 2021-08-13

## 2021-08-12 RX ORDER — FAMOTIDINE 20 MG/1
20 TABLET, FILM COATED ORAL 2 TIMES DAILY
Status: DISCONTINUED | OUTPATIENT
Start: 2021-08-12 | End: 2021-08-12

## 2021-08-12 RX ORDER — CARBIDOPA AND LEVODOPA 25; 100 MG/1; MG/1
1 TABLET, EXTENDED RELEASE ORAL NIGHTLY
Status: DISCONTINUED | OUTPATIENT
Start: 2021-08-12 | End: 2021-08-13

## 2021-08-12 RX ORDER — SODIUM CHLORIDE 0.9 % (FLUSH) 0.9 %
5-40 SYRINGE (ML) INJECTION PRN
Status: DISCONTINUED | OUTPATIENT
Start: 2021-08-12 | End: 2021-08-15 | Stop reason: HOSPADM

## 2021-08-12 RX ORDER — ONDANSETRON 2 MG/ML
4 INJECTION INTRAMUSCULAR; INTRAVENOUS EVERY 6 HOURS PRN
Status: DISCONTINUED | OUTPATIENT
Start: 2021-08-12 | End: 2021-08-15 | Stop reason: HOSPADM

## 2021-08-12 RX ORDER — LABETALOL HYDROCHLORIDE 5 MG/ML
10 INJECTION, SOLUTION INTRAVENOUS EVERY 6 HOURS PRN
Status: DISCONTINUED | OUTPATIENT
Start: 2021-08-12 | End: 2021-08-13

## 2021-08-12 RX ORDER — ACETAMINOPHEN 325 MG/1
650 TABLET ORAL EVERY 6 HOURS PRN
Status: DISCONTINUED | OUTPATIENT
Start: 2021-08-12 | End: 2021-08-15 | Stop reason: HOSPADM

## 2021-08-12 RX ORDER — ONDANSETRON 4 MG/1
4 TABLET, ORALLY DISINTEGRATING ORAL EVERY 8 HOURS PRN
Status: DISCONTINUED | OUTPATIENT
Start: 2021-08-12 | End: 2021-08-15 | Stop reason: HOSPADM

## 2021-08-12 RX ORDER — VITAMIN B COMPLEX
2000 TABLET ORAL DAILY
Status: DISCONTINUED | OUTPATIENT
Start: 2021-08-13 | End: 2021-08-15 | Stop reason: HOSPADM

## 2021-08-12 RX ORDER — ATORVASTATIN CALCIUM 10 MG/1
10 TABLET, FILM COATED ORAL NIGHTLY
Status: DISCONTINUED | OUTPATIENT
Start: 2021-08-12 | End: 2021-08-15 | Stop reason: HOSPADM

## 2021-08-12 RX ORDER — SODIUM CHLORIDE, SODIUM LACTATE, POTASSIUM CHLORIDE, CALCIUM CHLORIDE 600; 310; 30; 20 MG/100ML; MG/100ML; MG/100ML; MG/100ML
1000 INJECTION, SOLUTION INTRAVENOUS ONCE
Status: COMPLETED | OUTPATIENT
Start: 2021-08-12 | End: 2021-08-12

## 2021-08-12 RX ORDER — MODAFINIL 100 MG/1
50-100 TABLET ORAL EVERY MORNING
Status: CANCELLED | OUTPATIENT
Start: 2021-08-13

## 2021-08-12 RX ORDER — ASPIRIN 81 MG/1
81 TABLET ORAL
Status: DISCONTINUED | OUTPATIENT
Start: 2021-08-13 | End: 2021-08-15 | Stop reason: HOSPADM

## 2021-08-12 RX ORDER — IMIPRAMINE HCL 25 MG
25 TABLET ORAL NIGHTLY
Status: DISCONTINUED | OUTPATIENT
Start: 2021-08-12 | End: 2021-08-15 | Stop reason: HOSPADM

## 2021-08-12 RX ORDER — SODIUM CHLORIDE 0.9 % (FLUSH) 0.9 %
5-40 SYRINGE (ML) INJECTION EVERY 12 HOURS SCHEDULED
Status: DISCONTINUED | OUTPATIENT
Start: 2021-08-12 | End: 2021-08-15 | Stop reason: HOSPADM

## 2021-08-12 RX ORDER — POLYETHYLENE GLYCOL 3350 17 G/17G
17 POWDER, FOR SOLUTION ORAL DAILY PRN
Status: DISCONTINUED | OUTPATIENT
Start: 2021-08-12 | End: 2021-08-15 | Stop reason: HOSPADM

## 2021-08-12 RX ORDER — ACETAMINOPHEN 650 MG/1
650 SUPPOSITORY RECTAL EVERY 6 HOURS PRN
Status: DISCONTINUED | OUTPATIENT
Start: 2021-08-12 | End: 2021-08-15 | Stop reason: HOSPADM

## 2021-08-12 RX ADMIN — IMIPRAMINE HYDROCHLORIDE 25 MG: 25 TABLET ORAL at 21:36

## 2021-08-12 RX ADMIN — ATORVASTATIN CALCIUM 10 MG: 10 TABLET, FILM COATED ORAL at 21:36

## 2021-08-12 RX ADMIN — CARBIDOPA AND LEVODOPA 2 TABLET: 25; 100 TABLET ORAL at 14:45

## 2021-08-12 RX ADMIN — Medication 10 ML: at 21:25

## 2021-08-12 RX ADMIN — DONEPEZIL HYDROCHLORIDE 5 MG: 5 TABLET, FILM COATED ORAL at 21:36

## 2021-08-12 RX ADMIN — CARBIDOPA AND LEVODOPA 1 TABLET: 25; 100 TABLET, EXTENDED RELEASE ORAL at 23:09

## 2021-08-12 RX ADMIN — SODIUM CHLORIDE: 9 INJECTION, SOLUTION INTRAVENOUS at 21:27

## 2021-08-12 RX ADMIN — SODIUM CHLORIDE, SODIUM LACTATE, POTASSIUM CHLORIDE, AND CALCIUM CHLORIDE 1000 ML: .6; .31; .03; .02 INJECTION, SOLUTION INTRAVENOUS at 11:23

## 2021-08-12 RX ADMIN — CARBIDOPA AND LEVODOPA 3 TABLET: 25; 100 TABLET ORAL at 19:58

## 2021-08-12 ASSESSMENT — ENCOUNTER SYMPTOMS
NAUSEA: 0
WHEEZING: 0
CONSTIPATION: 0
ABDOMINAL PAIN: 0
BLOOD IN STOOL: 0
RESPIRATORY NEGATIVE: 1
BACK PAIN: 1
SHORTNESS OF BREATH: 0
VOMITING: 0
CHEST TIGHTNESS: 0
COUGH: 0
TROUBLE SWALLOWING: 0

## 2021-08-12 ASSESSMENT — PAIN SCALES - GENERAL: PAINLEVEL_OUTOF10: 0

## 2021-08-12 NOTE — PROGRESS NOTES
Attending Supervising Physician's Attestation Statement    I have personally seen, examined and evaluated the patient. We discussed the plan of care including the assessment and management of the patient. I have reviewed the resident physician's note and agree with the findings and plans as described. Patient is 45-year-old male with past medical history of Parkinson's disease who presented to hospital after patient fell generalized weakness, difficulty walking, according to the patient family at bedside patient was supposed to have colonoscopy today however patient could not walk had colonoscopy facility and felt to be weak so patient was referred to the hospital for further evaluation and to have colonoscopy during hospitalization.     Assessment and plan  Generalized weakness, fatigue likely secondary to dehydration, differential includes worsening Parkinson's disease-start IV fluid therapy,monitor BMP  History of diverticulosis, rectal bleeding-n.p.o. after midnight, GI consulted, plan for colonoscopy in the morning      Physical exam  Generalized-dry mucous membranes  Heart-S1-S2 intact, regular  Abdomen-nontender, nondistended  Neurological-grossly nonfocal but has resting tremor  Musculoskeletal-no pitting edema noticed bilateral lower legs, dry skin noted      Electronically signed by Og Olvera MD on 8/12/21 at 7:27 PM EDT

## 2021-08-12 NOTE — ED PROVIDER NOTES
[address] [facility] [address] ENCOUNTER          NURSE PRACTITIONER NOTE       Date of evaluation: 8/12/2021    Chief Complaint     Fatigue (prepped for colonoscopy yesterday, today feels weak, unable to walk, hx Parkinson's, if cleared able to possibly get scope here this afternoon, keep NPO)      History of Present Illness     Arcelia Walsh is a 68 y.o. male with a past medical history as noted below which includes advanced Parkinson's, carotid stenosis, Sneed's esophagus, hypertension, GERD, hyperlipidemia among others; who presents to the emergency department with a complaint of weakness. Patient unable to ambulate today due to weakness. Patient states that he was prepped for colonoscopy yesterday, has been n.p.o. since last night, and feels that this is added to his weakness. Patient states when he stood up he felt like his legs \"were going to give out on him. \"  He appears very drowsy, and soft-spoken. Arrives via EMS. Denies associated chest pain or shortness of breath at this time, denies other complaints. Review of Systems     Review of Systems   Constitutional: Positive for fatigue. Respiratory: Negative. Cardiovascular: Negative. Gastrointestinal: Negative for abdominal pain, nausea and vomiting. Genitourinary: Negative. Skin: Negative. Allergic/Immunologic: Negative for immunocompromised state. Neurological: Positive for weakness (generalized). Hematological: Does not bruise/bleed easily. Psychiatric/Behavioral: Negative.         Past Medical, Surgical, Family, and Social History     He has a past medical history of Anxiety, Arthritis, Arthritis of hand, right, Sneed esophagus, Sneed esophagus, Bilateral carotid artery stenosis <50%, BPH (benign prostatic hypertrophy), CAD (coronary artery disease), Carotid stenosis, Chest pain, Chronic back pain, Chronic back pain, Constipation, Degenerative arthritis of thoracic spine, Degenerative cervical disc, Depression with anxiety, Diverticulosis, Elevated PSA, Erectile dysfunction, Essential hypertension, benign, GERD (gastroesophageal reflux disease), Hemorrhoid, Hiatal hernia, Hyperlipidemia, Impaired fasting glucose, Osteopenia per CXR, Parkinson disease ([address] Renal stone, RLS (restless legs syndrome), S/P colonoscopy, S/P endoscopy, Sigmoidoscopy exam, Therapeutic drug monitoring, Thoracic spondylosis, Venous insufficiency of leg, and Vocal cord paresis. He has a past surgical history that includes Cataract removal; Colonoscopy (6729;0059); Upper gastrointestinal endoscopy (5/07); Knee cartilage surgery; Lithotripsy; osteotomy (Left); Knee Arthroplasty (Left, 7/30/13); TURP (N/A, [id number]); and joint replacement (Right, 02/23/2017). His family history includes Alcohol Abuse in his father; Cancer (age of onset: 68) in his sister; Coronary Art Dis (age of onset: 77) in his brother; Coronary Art Dis (age of onset: 80) in his mother; Heart Disease in his sister; Kidney Disease (age of onset: 61) in his father. He reports that he has never smoked. He has never used smokeless tobacco. He reports current alcohol use. He reports that he does not use drugs. Medications     Previous Medications    ASPIRIN 81 MG EC TABLET      Take 81 mg by mouth three times a week On Mondays, Wednesdays and Fridays    CARBIDOPA-LEVODOPA (SINEMET CR)  MG PER EXTENDED RELEASE TABLET    Take 1 tablet by mouth nightly 2300    CARBIDOPA-LEVODOPA (SINEMET)  MG PER TABLET    Take 3 tablets by mouth 4 times daily Take 3 tablets at 0800, 1200, 1600 and 2000. CHOLECALCIFEROL (VITAMIN D3) 2000 UNITS CAPS      Take 1 capsule by mouth daily     CLONAZEPAM (KLONOPIN) 0.5 MG TABLET    Take 1 mg by mouth nightly     DONEPEZIL (ARICEPT) 5 MG TABLET    Take 5 mg by mouth nightly    IMIPRAMINE (TOFRANIL) 25 MG TABLET    TAKE 1 TABLET NIGHTLY    LIDOCAINE (LIDODERM) 5 %    Place 1 patch onto the skin daily 12 hours on, 12 hours off. LINACLOTIDE (LINZESS) 290 MCG CAPS CAPSULE    Take 290 mcg by mouth every morning (before breakfast)    MODAFINIL (PROVIGIL) 100 MG TABLET    Take  mg by mouth every morning. PANTOPRAZOLE (PROTONIX) 40 MG TABLET      Take 40 mg by mouth daily     POLYETHYLENE GLYCOL 3350 (MIRALAX PO)      Take 17 g by mouth daily as needed     SIMVASTATIN (ZOCOR) 10 MG TABLET    Take 1 tablet by mouth nightly       Allergies     He is allergic to chlorpromazine, haloperidol lactate, metoclopramide, prochlorperazine, promethazine, promethazine hcl, sulfa antibiotics, and levofloxacin. Physical Exam     INITIAL VITALS: BP: (!) 180/85, Temp: 98.5 °F (36.9 °C), Pulse: 65, Resp: 15, SpO2: 99 %   Physical Exam  Vitals and nursing note reviewed. Constitutional:       Comments: Drowsy male, NAD   Cardiovascular:      Rate and Rhythm: Normal rate and regular rhythm. Pulses: Normal pulses. Heart sounds: Normal heart sounds. Pulmonary:      Effort: Pulmonary effort is normal. No respiratory distress. Breath sounds: Normal breath sounds. Abdominal:      General: Bowel sounds are normal. There is no distension. Palpations: Abdomen is soft. Tenderness: There is no abdominal tenderness. Musculoskeletal:      Cervical back: Normal range of motion and neck supple. Skin:     General: Skin is warm and dry. Neurological:      General: No focal deficit present. Mental Status: He is oriented to person, place, and time.       Comments: Very drowsy, slow to respond and soft spoken   Psychiatric:         Mood and Affect: Mood normal.         Behavior: Behavior normal.           Diagnostic Results     EKG   Interpreted in conjunction with emergency department physician No att. providers found  Rhythm: normal sinus   Rate: normal  Axis: normal  Ectopy: none  Conduction: normal  ST Segments: no acute change  T Waves: no acute change  Q Waves: none  Clinical Impression: no acute changes    RADIOLOGY:  CT HEAD WO CONTRAST   Final Result      No acute intracranial abnormality. XR CHEST (2 VW)   Final Result      Linear density in the left lower lung-atelectasis/scarring is favored over pneumonia. Right lung is clear. Normal cardiomediastinal silhouette.           LABS:   Results for orders placed or performed during the hospital encounter of 08/12/21   CBC auto differential   Result Value Ref Range    WBC 8.8 4.0 - 11.0 K/uL    RBC 4.53 4.20 - 5.90 M/uL    Hemoglobin 14.4 13.5 - 17.5 g/dL    Hematocrit 43.5 40.5 - 52.5 %    MCV 96.1 80.0 - 100.0 fL    MCH 31.8 26.0 - 34.0 pg    MCHC 33.1 31.0 - 36.0 g/dL    RDW 14.8 12.4 - 15.4 %    Platelets 867 881 - 392 K/uL    MPV 8.6 5.0 - 10.5 fL    Neutrophils % 78.7 %    Lymphocytes % 10.9 %    Monocytes % 9.3 %    Eosinophils % 0.8 %    Basophils % 0.3 %    Neutrophils Absolute 6.9 1.7 - 7.7 K/uL    Lymphocytes Absolute 1.0 1.0 - 5.1 K/uL    Monocytes Absolute 0.8 0.0 - 1.3 K/uL    Eosinophils Absolute 0.1 0.0 - 0.6 K/uL    Basophils Absolute 0.0 0.0 - 0.2 K/uL   Basic Metabolic Panel w/ Reflex to MG   Result Value Ref Range    Sodium 133 (L) 136 - 145 mmol/L    Potassium reflex Magnesium 3.8 3.5 - 5.1 mmol/L    Chloride 97 (L) 99 - 110 mmol/L    CO2 28 21 - 32 mmol/L    Anion Gap 8 3 - 16    Glucose 116 (H) 70 - 99 mg/dL    BUN 11 7 - 20 mg/dL    CREATININE 0.8 0.8 - 1.3 mg/dL    GFR Non-African American >60 >60    GFR African American >60 >60    Calcium 10.1 8.3 - 10.6 mg/dL   Urinalysis, reflex to microscopic (Lab)   Result Value Ref Range    Color, UA Yellow Straw/Yellow    Clarity, UA Clear Clear    Glucose, Ur Negative Negative mg/dL    Bilirubin Urine Negative Negative    Ketones, Urine 15 (A) Negative mg/dL    Specific Gravity, UA 1.010 1.005 - 1.030    Blood, Urine Negative Negative    pH, UA 6.5 5.0 - 8.0    Protein, UA Negative Negative mg/dL    Urobilinogen, Urine 0.2 <2.0 E.U./dL    Nitrite, Urine Negative Negative    Leukocyte Esterase, Urine unremarkable; CBC stable without signs of hemoconcentration, troponin within normal limits, BMP with a stable creatinine of 0.8, BUN of 11, sodium of 133. Patient was given a liter of LR, did perk up a little bit after this, and felt a little bit improved. Imaging including head CT and chest x-ray were overall reassuring, no concern for pneumonia clinically at this time. Attempted to ambulate patient, he required a two-person assist for ambulation and is not safe for discharge home at this time given this. I do not think that acute dehydration is the cause of his increased weakness and fatigue, he has missed a dose of his fast acting Sinemet today, however given concerns for other causes of fatigue including progressively worsening Parkinson's and inability to ambulate currently; I do feel he warrants admission to the hospital.  Case was discussed with his GI physician, who stated that he would most likely have a colonoscopy tomorrow; and to put him on a clear diet today. This was ordered. Case was also discussed with the hospitalist, who accepted the patient for admission. This patient was also evaluated by the attending physician. All care plans were discussed and agreed upon. Clinical Impression     1. Generalized weakness    2.  Unable to ambulate        Disposition       DISPOSITION  Admitted in stable condition         ROGE Olivera - CNP  08/12/21 5935

## 2021-08-12 NOTE — ED PROVIDER NOTES
ED Attending Attestation Note     Date of evaluation: 8/12/2021    This patient was seen by the advance practice provider. I have seen and examined the patient, agree with the workup, evaluation, management and diagnosis. The care plan has been discussed. I have reviewed the ECG and concur with the ALEJANDRO's interpretation. My assessment reveals a very weak frail appearing gentleman presenting today with slowly progressive weakness fatigue with inability to walk because of this. He was actually set for colonoscopy the day had been doing prep for that has had a lot of diarrhea and loose stools and went into the GI office today but was weak unable to ambulate so they sent him here. Here he endorses that he was able to walk yesterday but has generalized fatigue. He is soft-spoken follows commands briskly is fully alert and oriented moves extremities without focal weakness but has generalized weakness. No focal neurologic deficit. We will do broad metabolic work-up does have a history of Parkinson's as well.      Lauryn Chaves MD  08/12/21 2727

## 2021-08-12 NOTE — Clinical Note
Patient Class: Inpatient [101]   REQUIRED: Diagnosis: Generalized weakness [760025]   Estimated Length of Stay: Estimated stay of more than 2 midnights

## 2021-08-12 NOTE — ED NOTES
PT AMBULATED WITH ASSIST X 2. GAIT UNSTEADY. WIFE STATES THIS MAY BE EFFECTED BY PT BEING NPO AND NOT HAVING TODAY'S DOSE OF PARKINSON MEDICATION.       Aleida Hodgson RN  08/12/21 8646

## 2021-08-12 NOTE — H&P
Internal Medicine  PGY 1  History & Physical      CC: Fatigue and weakness    History Obtained From:  patient, electronic medical record    HISTORY OF PRESENT ILLNESS:    Brianne Snyder is a 68 y.o. male with a past medical history that includes advanced Parkinson's, carotid stenosis, Sneed's esophagus, hypertension, GERD, hyperlipidemia, arthritis, and diverticulitis; who presents to the emergency department with a chief complaint of weakness and fatigue. His weakness is new onset and just started today after he was prepping for colonoscopy intended to determine if his rectal bleeding a few months ago was due to diverticulitis or another etiology. He has been NPO since midnight and feels like that contributed to his weakness. He took the full course of bowel prep and has been passing clear BM. When he stands up he has increased dizziness and feels like his legs will give out on him. He endorses increasing weakness, ataxia, light-headedness and dizziness in the last few months which he believes is due to his Parkinsons. He falls with increasing frequency but had no falls today and no head injury. He is compliant with home meds but says he only took half his normal dose of carvidopa-levodopa this morning in anticipation of colonoscopy. A couple days ago he also started taking a new levodopa/carvidopa combination pill called Rytary. He had great difficulty getting up out of a chair and experienced light-headedness, dizziness, but no N/V at his GI doctor's office so they cancelled the procedure and had him go to the ED. He reports he had a a recent stress test that came back normal. He claims his BP at home fluctuates, but says he has no diagnosis of HTN and does not take any medications for his blood pressure. Denies associated chest pain or shortness of breath at this time, denies other complaints.     Past Medical History:        Diagnosis Date    Anxiety     PCP in [place]:Dr. Jaja Saleh.    Arthritis  Arthritis of hand, right 6/20/2012    Sneed esophagus     Dr. Sarah Schroeder. EGD:1/17/2012. Next EGD due in 3yrs=1/2015    Sneed esophagus     Under care of GI    Bilateral carotid artery stenosis <50% 4/30/2014    BPH (benign prostatic hypertrophy)     Dr. Katie Alexander. PSA per urology.  CAD (coronary artery disease)     under care of cards:Dr. Ricki Woo    Carotid stenosis     Chest pain     pt states he no chest pain in 5 yrs, panic attack    Chronic back pain 1/26/2012    Chronic back pain     under care of ortho spine:Dr. Sweeney    Constipation 8/6/2013    Degenerative arthritis of thoracic spine 1/26/2012    Degenerative cervical disc 1/26/2012    Depression with anxiety 11/15/2011    Klonopin per neurologist(Dr. Sorto)    Diverticulosis 1/17/2012    colonoscopy    Elevated PSA     8/21/14;5/2013:under care of Dr. Beltran/[place]:[facility]rology group    Erectile dysfunction 6/13/2017    Essential hypertension, benign 6/20/2012    cardiologist:Dr. Parra Ace    GERD (gastroesophageal reflux disease)     Hemorrhoid     Hiatal hernia     small    Hyperlipidemia     Impaired fasting glucose 5/19/2014    Osteopenia per CXR 5/19/2014    Parkinson disease ([facility] [address]     Dr. Doll Delay Neurology    Renal stone 4/2012    4 mm distal left ureteral calculus:Dr. Beltran:urologist    RLS (restless legs syndrome)     S/P colonoscopy 2011;5/20/19    Dr. Aly Zhang per pt' next is in 10yrs-2021. 5/20/19(Dr. Jose Juan Roblero)    S/P endoscopy 1/2012    Dr. Marck Lake acute changes:+Barrets:next in 3yrs 1/2015    Sigmoidoscopy exam 1/17/2012    Dr. Lama:No acute changes.  Next in 5yrs=1/2017    Therapeutic drug monitoring 5/14/15    Consistent OARRS report on 5/14/15;8/4/15    Thoracic spondylosis     under care of ortho spine:Dr. Freya Amor    Venous insufficiency of leg     Vocal cord paresis 5/11/2016    under care of ENT:Dr. Tonya Luke. s/p vocal cord augmentation 6/2016 at [address]   ·     Past Surgical History: Procedure Laterality Date    CATARACT REMOVAL      COLONOSCOPY  [id number]    JOINT REPLACEMENT Right 02/23/2017    Right TKR(knee)    KNEE ARTHROPLASTY Left 7/30/13    LEFT TOTAL KNEE ARTHROPLASTY              KNEE CARTILAGE SURGERY      Left x 2, right x1    LITHOTRIPSY      Kidney stone removal as per urology:Stent removed after 10days    OSTEOTOMY Left     TURP N/A [id number]    TRANSURETHRAL RESECTION OF PROSTATE    UPPER GASTROINTESTINAL ENDOSCOPY  5/07   ·     Medications Priorto Admission:    · Not in a hospital admission. Allergies:  Chlorpromazine, Haloperidol lactate, Metoclopramide, Prochlorperazine, Promethazine, Promethazine hcl, Sulfa antibiotics, and Levofloxacin    Social History:   · TOBACCO:   reports that he has never smoked. He has never used smokeless tobacco.  · ETOH:   reports current alcohol use. 1 drink a day  · DRUGS : none  · Patient currently lives with family spouse  ·   Family History:       Problem Relation Age of Onset    Cancer Sister 68        breast     Heart Disease Sister     Kidney Disease Father 61    Alcohol Abuse Father     Coronary Art Dis Mother 80    Coronary Art Dis Brother 77   ·     Review of Systems   Constitutional: Positive for fatigue. Negative for chills, diaphoresis, fever and unexpected weight change. HENT: Negative for nosebleeds and trouble swallowing. Eyes: Negative for visual disturbance. Respiratory: Negative for cough, chest tightness, shortness of breath and wheezing. Cardiovascular: Negative for chest pain, palpitations and leg swelling. Gastrointestinal: Negative for abdominal pain, blood in stool, constipation, nausea and vomiting. Genitourinary: Positive for urgency. Negative for dysuria. Musculoskeletal: Positive for back pain. Endorses leg cramping pain and bilateral knee pain   Neurological: Positive for dizziness, tremors, weakness, light-headedness and headaches. Negative for seizures and syncope. Psychiatric/Behavioral: Negative for agitation and behavioral problems. Has anosmia, but no vision changes    Physical exam:    Physical Exam  Vitals reviewed. Constitutional:       General: He is not in acute distress. Appearance: Normal appearance. He is normal weight. He is not ill-appearing. HENT:      Head: Normocephalic. Nose: Nose normal.      Mouth/Throat:      Mouth: Mucous membranes are moist.      Pharynx: No oropharyngeal exudate or posterior oropharyngeal erythema. Eyes:      General:         Right eye: No discharge. Left eye: No discharge. Conjunctiva/sclera: Conjunctivae normal.      Pupils: Pupils are equal, round, and reactive to light. Cardiovascular:      Rate and Rhythm: Normal rate and regular rhythm. Pulses: Normal pulses. Heart sounds: Normal heart sounds. No murmur heard. No friction rub. Pulmonary:      Effort: Pulmonary effort is normal. No respiratory distress. Breath sounds: Normal breath sounds. No stridor. No wheezing, rhonchi or rales. Chest:      Chest wall: No tenderness. Abdominal:      General: Bowel sounds are normal. There is no distension. Palpations: Abdomen is soft. Tenderness: There is no abdominal tenderness. There is no guarding or rebound. Musculoskeletal:      Cervical back: No rigidity or tenderness. Right lower leg: No edema. Left lower leg: No edema. Skin:     General: Skin is warm. Coloration: Skin is not jaundiced. Findings: No erythema or lesion. Neurological:      Mental Status: He is alert and oriented to person, place, and time. Cranial Nerves: No cranial nerve deficit. Motor: Weakness (Slight L sided weakness compared to full strength in right extremities) present. Comments: Bilateral hand tremor present, mild cogwheel rigidity in R hand.    Psychiatric:         Mood and Affect: Mood normal.         Behavior: Behavior normal.     Patient is alert and conversational  He has scrapes healed lacerations on bilateral legs that he says are due to constantly falling from losing his balance. Vitals:    08/12/21 1445   BP: (!) 179/88   Pulse: 75   Resp: 16   Temp:    SpO2: 98%       DATA:    Labs:  CBC:   Recent Labs     08/12/21  1123   WBC 8.8   HGB 14.4   HCT 43.5          BMP:   Recent Labs     08/12/21  1123   *   K 3.8   CL 97*   CO2 28   BUN 11   CREATININE 0.8   GLUCOSE 116*     LFT's: No results for input(s): AST, ALT, ALB, BILITOT, ALKPHOS in the last 72 hours. Troponin:   Recent Labs     08/12/21  1123   TROPONINI <0.01     BNP:No results for input(s): BNP in the last 72 hours. ABGs: No results for input(s): PHART, QLA2DGY, PO2ART in the last 72 hours. INR: No results for input(s): INR in the last 72 hours. U/A:  Recent Labs     08/12/21  1118   COLORU Yellow   PHUR 6.5   CLARITYU Clear   SPECGRAV 1.010   LEUKOCYTESUR Negative   UROBILINOGEN 0.2   BILIRUBINUR Negative   BLOODU Negative   GLUCOSEU Negative       CT HEAD WO CONTRAST   Final Result      No acute intracranial abnormality. XR CHEST (2 VW)   Final Result      Linear density in the left lower lung-atelectasis/scarring is favored over pneumonia. Right lung is clear. Normal cardiomediastinal silhouette. ASSESSMENT AND PLAN:     Breonna Duron is a 68 y.o. male with a PmHx of advanced Parkinson's, carotid stenosis, Sneed's esophagus, hypertension, GERD, hyperlipidemia, arthritis, and diverticulitis; who presents to the emergency department with a chief complaint of weakness and fatigue. Generalized weakness and fatigue 2/2 Parkinson's Disease  Likely 2/2 alterations in his Parkinson's medication routine, possibly exacerbated by bowel prep in prepaparation for colonoscopy. He started taking a new form of Levo/Carbo and missed half a dose this morning. He felt that his weakness resolved in ED after receiving full dose.  Generalized weakness can also just be a side effect of Levodopa and Carbidopa. Hgb is 14.4 so he is not anemic. He could be dehydrated from the bowel prep but his sodium is 133. Hyponatremia could also exacerbate his chronic coordination deficits. CT head was normal so less likely stroke or neurological cause. Chest XR and urinalysis do not support an infectious etiology. EKG and troponin normal with prior normal stress test making cardiac etiology less likely. Reports symptoms resolved at time of transfer to the wards. Follows with Atiya Arias and Neurology. Using walker more often. - Continue home Parkinson's medications  - PT/OT  - Urine sodium and omsol studies. Serum osmol pending  - Put on NS  ml/hr (received 1L bolus at 11:00)  - Continue home donepezil    Hx of Rectal bleeding and diverticulosis  Patient has already been prepped for colonoscopy prior to admission. Will perform inpatient to avoid clean out and reduced intake as a potential cause of dehydration and weakness. Denies current bloody stools.   Colon narrowing concern for mass on CT 5/21.   - Colonoscopy tomorrow, already underwent clean out  - Diet: clear liquids, NPO at midnight  - GI consulted, appreciate recs  - Hemoglobin is at baseline, low concern for active diverticulitis or rectal bleeding    HTN  Elevated BP in the ED, says it fluctuates at home but does not take any HTN medications  - Ordered labetalol q6h PRN with admin and hold parameters    GERD  - Home protonix    Mild recurrent major depression ([facility] [address]  Anxiety  - Continue home imipramine      Will discuss with attending physician Dr. Iman Camilo MD.    Code Status: Full code  FEN: Clear liquid, NPO at midnight  PPX: Lovenox starting tomorrow evening  DISPO: Ge Alexander MD  8/12/2021,  4:05 PM

## 2021-08-13 ENCOUNTER — ANESTHESIA EVENT (OUTPATIENT)
Dept: ENDOSCOPY | Age: 76
DRG: 057 | End: 2021-08-13
Payer: MEDICARE

## 2021-08-13 ENCOUNTER — ANESTHESIA (OUTPATIENT)
Dept: ENDOSCOPY | Age: 76
DRG: 057 | End: 2021-08-13
Payer: MEDICARE

## 2021-08-13 VITALS
DIASTOLIC BLOOD PRESSURE: 61 MMHG | RESPIRATION RATE: 15 BRPM | SYSTOLIC BLOOD PRESSURE: 107 MMHG | OXYGEN SATURATION: 97 %

## 2021-08-13 LAB
ANION GAP SERPL CALCULATED.3IONS-SCNC: 10 MMOL/L (ref 3–16)
BASOPHILS ABSOLUTE: 0 K/UL (ref 0–0.2)
BASOPHILS RELATIVE PERCENT: 0.4 %
BUN BLDV-MCNC: 8 MG/DL (ref 7–20)
CALCIUM SERPL-MCNC: 9.1 MG/DL (ref 8.3–10.6)
CHLORIDE BLD-SCNC: 103 MMOL/L (ref 99–110)
CO2: 28 MMOL/L (ref 21–32)
CREAT SERPL-MCNC: 0.8 MG/DL (ref 0.8–1.3)
EOSINOPHILS ABSOLUTE: 0.2 K/UL (ref 0–0.6)
EOSINOPHILS RELATIVE PERCENT: 2.6 %
GFR AFRICAN AMERICAN: >60
GFR NON-AFRICAN AMERICAN: >60
GLUCOSE BLD-MCNC: 96 MG/DL (ref 70–99)
HCT VFR BLD CALC: 39.7 % (ref 40.5–52.5)
HEMOGLOBIN: 13.4 G/DL (ref 13.5–17.5)
LYMPHOCYTES ABSOLUTE: 1.3 K/UL (ref 1–5.1)
LYMPHOCYTES RELATIVE PERCENT: 20.9 %
MAGNESIUM: 2.1 MG/DL (ref 1.8–2.4)
MCH RBC QN AUTO: 31.9 PG (ref 26–34)
MCHC RBC AUTO-ENTMCNC: 33.6 G/DL (ref 31–36)
MCV RBC AUTO: 94.8 FL (ref 80–100)
MONOCYTES ABSOLUTE: 0.8 K/UL (ref 0–1.3)
MONOCYTES RELATIVE PERCENT: 12.8 %
NEUTROPHILS ABSOLUTE: 4.1 K/UL (ref 1.7–7.7)
NEUTROPHILS RELATIVE PERCENT: 63.3 %
OSMOLALITY URINE: 149 MOSM/KG (ref 390–1070)
OSMOLALITY: 294 MOSM/KG (ref 278–305)
PDW BLD-RTO: 14.5 % (ref 12.4–15.4)
PHOSPHORUS: 3.1 MG/DL (ref 2.5–4.9)
PLATELET # BLD: 205 K/UL (ref 135–450)
PMV BLD AUTO: 8.7 FL (ref 5–10.5)
POTASSIUM SERPL-SCNC: 3.9 MMOL/L (ref 3.5–5.1)
RBC # BLD: 4.19 M/UL (ref 4.2–5.9)
SODIUM BLD-SCNC: 141 MMOL/L (ref 136–145)
TSH REFLEX: 2.68 UIU/ML (ref 0.27–4.2)
WBC # BLD: 6.4 K/UL (ref 4–11)

## 2021-08-13 PROCEDURE — 80048 BASIC METABOLIC PNL TOTAL CA: CPT

## 2021-08-13 PROCEDURE — 97163 PT EVAL HIGH COMPLEX 45 MIN: CPT

## 2021-08-13 PROCEDURE — 2500000003 HC RX 250 WO HCPCS: Performed by: NURSE ANESTHETIST, CERTIFIED REGISTERED

## 2021-08-13 PROCEDURE — 97535 SELF CARE MNGMENT TRAINING: CPT

## 2021-08-13 PROCEDURE — 82306 VITAMIN D 25 HYDROXY: CPT

## 2021-08-13 PROCEDURE — 82607 VITAMIN B-12: CPT

## 2021-08-13 PROCEDURE — 2580000003 HC RX 258: Performed by: INTERNAL MEDICINE

## 2021-08-13 PROCEDURE — 97166 OT EVAL MOD COMPLEX 45 MIN: CPT

## 2021-08-13 PROCEDURE — 84100 ASSAY OF PHOSPHORUS: CPT

## 2021-08-13 PROCEDURE — 2060000000 HC ICU INTERMEDIATE R&B

## 2021-08-13 PROCEDURE — 84443 ASSAY THYROID STIM HORMONE: CPT

## 2021-08-13 PROCEDURE — 83735 ASSAY OF MAGNESIUM: CPT

## 2021-08-13 PROCEDURE — 7100000011 HC PHASE II RECOVERY - ADDTL 15 MIN: Performed by: INTERNAL MEDICINE

## 2021-08-13 PROCEDURE — 3700000001 HC ADD 15 MINUTES (ANESTHESIA): Performed by: INTERNAL MEDICINE

## 2021-08-13 PROCEDURE — 6360000002 HC RX W HCPCS: Performed by: NURSE ANESTHETIST, CERTIFIED REGISTERED

## 2021-08-13 PROCEDURE — 6360000002 HC RX W HCPCS: Performed by: STUDENT IN AN ORGANIZED HEALTH CARE EDUCATION/TRAINING PROGRAM

## 2021-08-13 PROCEDURE — 3609027000 HC COLONOSCOPY: Performed by: INTERNAL MEDICINE

## 2021-08-13 PROCEDURE — 2580000003 HC RX 258: Performed by: NURSE ANESTHETIST, CERTIFIED REGISTERED

## 2021-08-13 PROCEDURE — 85025 COMPLETE CBC W/AUTO DIFF WBC: CPT

## 2021-08-13 PROCEDURE — 7100000010 HC PHASE II RECOVERY - FIRST 15 MIN: Performed by: INTERNAL MEDICINE

## 2021-08-13 PROCEDURE — 36415 COLL VENOUS BLD VENIPUNCTURE: CPT

## 2021-08-13 PROCEDURE — 3700000000 HC ANESTHESIA ATTENDED CARE: Performed by: INTERNAL MEDICINE

## 2021-08-13 PROCEDURE — 0DJD8ZZ INSPECTION OF LOWER INTESTINAL TRACT, VIA NATURAL OR ARTIFICIAL OPENING ENDOSCOPIC: ICD-10-PCS | Performed by: INTERNAL MEDICINE

## 2021-08-13 PROCEDURE — 6370000000 HC RX 637 (ALT 250 FOR IP): Performed by: INTERNAL MEDICINE

## 2021-08-13 PROCEDURE — 97530 THERAPEUTIC ACTIVITIES: CPT

## 2021-08-13 PROCEDURE — 84439 ASSAY OF FREE THYROXINE: CPT

## 2021-08-13 PROCEDURE — 6370000000 HC RX 637 (ALT 250 FOR IP): Performed by: STUDENT IN AN ORGANIZED HEALTH CARE EDUCATION/TRAINING PROGRAM

## 2021-08-13 PROCEDURE — 2580000003 HC RX 258: Performed by: STUDENT IN AN ORGANIZED HEALTH CARE EDUCATION/TRAINING PROGRAM

## 2021-08-13 RX ORDER — HYDRALAZINE HYDROCHLORIDE 20 MG/ML
INJECTION INTRAMUSCULAR; INTRAVENOUS PRN
Status: DISCONTINUED | OUTPATIENT
Start: 2021-08-13 | End: 2021-08-13 | Stop reason: SDUPTHER

## 2021-08-13 RX ORDER — PROPOFOL 10 MG/ML
INJECTION, EMULSION INTRAVENOUS PRN
Status: DISCONTINUED | OUTPATIENT
Start: 2021-08-13 | End: 2021-08-13 | Stop reason: SDUPTHER

## 2021-08-13 RX ORDER — HYDRALAZINE HYDROCHLORIDE 20 MG/ML
5 INJECTION INTRAMUSCULAR; INTRAVENOUS EVERY 4 HOURS PRN
Status: DISCONTINUED | OUTPATIENT
Start: 2021-08-13 | End: 2021-08-13

## 2021-08-13 RX ORDER — SODIUM CHLORIDE 9 MG/ML
INJECTION, SOLUTION INTRAVENOUS CONTINUOUS PRN
Status: DISCONTINUED | OUTPATIENT
Start: 2021-08-13 | End: 2021-08-13 | Stop reason: SDUPTHER

## 2021-08-13 RX ORDER — LIDOCAINE HYDROCHLORIDE 20 MG/ML
INJECTION, SOLUTION INFILTRATION; PERINEURAL PRN
Status: DISCONTINUED | OUTPATIENT
Start: 2021-08-13 | End: 2021-08-13 | Stop reason: SDUPTHER

## 2021-08-13 RX ORDER — SODIUM CHLORIDE 9 MG/ML
INJECTION, SOLUTION INTRAVENOUS CONTINUOUS
Status: DISCONTINUED | OUTPATIENT
Start: 2021-08-13 | End: 2021-08-14

## 2021-08-13 RX ADMIN — SODIUM CHLORIDE: 9 INJECTION, SOLUTION INTRAVENOUS at 06:59

## 2021-08-13 RX ADMIN — PANTOPRAZOLE SODIUM 40 MG: 40 TABLET, DELAYED RELEASE ORAL at 12:05

## 2021-08-13 RX ADMIN — PROPOFOL 20 MG: 10 INJECTION, EMULSION INTRAVENOUS at 16:51

## 2021-08-13 RX ADMIN — PROPOFOL 20 MG: 10 INJECTION, EMULSION INTRAVENOUS at 16:55

## 2021-08-13 RX ADMIN — CARBIDOPA AND LEVODOPA 3 TABLET: 25; 100 TABLET ORAL at 12:06

## 2021-08-13 RX ADMIN — PROPOFOL 20 MG: 10 INJECTION, EMULSION INTRAVENOUS at 16:46

## 2021-08-13 RX ADMIN — CARBIDOPA AND LEVODOPA 2 TABLET: 25; 100 TABLET ORAL at 17:52

## 2021-08-13 RX ADMIN — SODIUM CHLORIDE: 9 INJECTION, SOLUTION INTRAVENOUS at 15:33

## 2021-08-13 RX ADMIN — PROPOFOL 20 MG: 10 INJECTION, EMULSION INTRAVENOUS at 16:59

## 2021-08-13 RX ADMIN — DONEPEZIL HYDROCHLORIDE 5 MG: 5 TABLET, FILM COATED ORAL at 20:20

## 2021-08-13 RX ADMIN — HYDRALAZINE HYDROCHLORIDE 5 MG: 20 INJECTION INTRAMUSCULAR; INTRAVENOUS at 16:50

## 2021-08-13 RX ADMIN — PROPOFOL 50 MG: 10 INJECTION, EMULSION INTRAVENOUS at 16:40

## 2021-08-13 RX ADMIN — LIDOCAINE HYDROCHLORIDE 60 MG: 20 INJECTION, SOLUTION INFILTRATION; PERINEURAL at 16:35

## 2021-08-13 RX ADMIN — ASPIRIN 81 MG: 81 TABLET, COATED ORAL at 12:06

## 2021-08-13 RX ADMIN — ATORVASTATIN CALCIUM 10 MG: 10 TABLET, FILM COATED ORAL at 20:20

## 2021-08-13 RX ADMIN — CARBIDOPA AND LEVODOPA 2 TABLET: 25; 100 TABLET ORAL at 20:20

## 2021-08-13 RX ADMIN — Medication 10 ML: at 20:20

## 2021-08-13 RX ADMIN — ENOXAPARIN SODIUM 40 MG: 40 INJECTION SUBCUTANEOUS at 18:00

## 2021-08-13 RX ADMIN — IMIPRAMINE HYDROCHLORIDE 25 MG: 25 TABLET ORAL at 20:20

## 2021-08-13 RX ADMIN — SODIUM CHLORIDE: 9 INJECTION, SOLUTION INTRAVENOUS at 16:33

## 2021-08-13 RX ADMIN — Medication 2000 UNITS: at 12:07

## 2021-08-13 RX ADMIN — PROPOFOL 100 MG: 10 INJECTION, EMULSION INTRAVENOUS at 16:35

## 2021-08-13 ASSESSMENT — PULMONARY FUNCTION TESTS
PIF_VALUE: 0

## 2021-08-13 ASSESSMENT — ENCOUNTER SYMPTOMS
ABDOMINAL DISTENTION: 0
COUGH: 0
CHEST TIGHTNESS: 0
EYES NEGATIVE: 1
SHORTNESS OF BREATH: 0
VOICE CHANGE: 0

## 2021-08-13 ASSESSMENT — PAIN SCALES - GENERAL
PAINLEVEL_OUTOF10: 0
PAINLEVEL_OUTOF10: 5
PAINLEVEL_OUTOF10: 0
PAINLEVEL_OUTOF10: 0

## 2021-08-13 ASSESSMENT — PAIN DESCRIPTION - PROGRESSION: CLINICAL_PROGRESSION: NOT CHANGED

## 2021-08-13 ASSESSMENT — PAIN DESCRIPTION - LOCATION: LOCATION: BACK

## 2021-08-13 ASSESSMENT — PAIN DESCRIPTION - FREQUENCY: FREQUENCY: CONTINUOUS

## 2021-08-13 ASSESSMENT — PAIN DESCRIPTION - PAIN TYPE: TYPE: CHRONIC PAIN

## 2021-08-13 ASSESSMENT — PAIN - FUNCTIONAL ASSESSMENT
PAIN_FUNCTIONAL_ASSESSMENT: ACTIVITIES ARE NOT PREVENTED
PAIN_FUNCTIONAL_ASSESSMENT: 0-10

## 2021-08-13 ASSESSMENT — PAIN DESCRIPTION - DESCRIPTORS: DESCRIPTORS: ACHING

## 2021-08-13 ASSESSMENT — PAIN DESCRIPTION - ORIENTATION: ORIENTATION: MID

## 2021-08-13 ASSESSMENT — PAIN DESCRIPTION - ONSET: ONSET: ON-GOING

## 2021-08-13 NOTE — PROGRESS NOTES
Progress Note    Admit Date: 8/12/2021  Diet: Diet NPO    CC: Fatigue and weakness    Interval history:   -Patient lethargic upon examination  -Hypertensive without symptoms(given hydralazine)      Medications:     Scheduled Meds:   aspirin  81 mg Oral Once per day on Mon Wed Fri    carbidopa-levodopa  1 tablet Oral Nightly    carbidopa-levodopa  3 tablet Oral 4x Daily    Vitamin D  2,000 Units Oral Daily    donepezil  5 mg Oral Nightly    imipramine  25 mg Oral Nightly    pantoprazole  40 mg Oral Daily    atorvastatin  10 mg Oral Nightly    sodium chloride flush  5-40 mL Intravenous 2 times per day    enoxaparin  40 mg Subcutaneous Daily     Continuous Infusions:   sodium chloride       PRN Meds:hydrALAZINE, sodium chloride flush, sodium chloride, ondansetron **OR** ondansetron, polyethylene glycol, acetaminophen **OR** acetaminophen, labetalol    Objective:   Vitals:   T-max:  Patient Vitals for the past 8 hrs:   BP Temp Temp src Pulse Resp SpO2 Weight   08/13/21 0912 (!) 197/93 -- -- -- -- -- --   08/13/21 0907 (!) 200/89 97.8 °F (36.6 °C) Oral 51 12 95 % --   08/13/21 0324 (!) 177/88 97.5 °F (36.4 °C) Oral 60 15 97 % 155 lb 3.3 oz (70.4 kg)       Intake/Output Summary (Last 24 hours) at 8/13/2021 1011  Last data filed at 8/13/2021 0343  Gross per 24 hour   Intake 60 ml   Output 760 ml   Net -700 ml     Review of Systems   Constitutional: Positive for fatigue. HENT: Negative for voice change. Eyes: Negative. Respiratory: Negative for cough, chest tightness and shortness of breath. Cardiovascular: Negative for chest pain and leg swelling. Gastrointestinal: Negative for abdominal distention. Genitourinary: Positive for urgency. Neurological: Positive for dizziness, speech difficulty, light-headedness and headaches. Psychiatric/Behavioral: Positive for confusion. Negative for agitation. Physical Exam  Cardiovascular:      Rate and Rhythm: Bradycardia present.       Heart sounds: Sneed's esophagus, hypertension, GERD, hyperlipidemia, arthritis, and diverticulitis; who presents to the emergency department with a chief complaint of weakness and fatigue.      Generalized weakness and fatigue 2/2 Parkinson's Disease  Likely 2/2 alterations in his Parkinson's medication routine, possibly exacerbated by bowel prep in prepaparation for colonoscopy. He started taking a new form of Levo/Carbo and missed half a dose this morning. He felt that his weakness resolved in ED after receiving full dose. Generalized weakness can also just be a side effect of Levodopa and Carbidopa. Hgb is 14.4 so he is not anemic. He could be dehydrated from the bowel prep but his sodium is 133. Hyponatremia could also exacerbate his chronic coordination deficits. CT head was normal so less likely stroke or neurological cause. Chest XR and urinalysis do not support an infectious etiology. EKG and troponin normal with prior normal stress test making cardiac etiology less likely. Reports symptoms resolved at time of transfer to the wards. Follows with Atiya Arias and Neurology. Using walker more often. - Continue home Parkinson's medications  - PT/OT  - Urine sodium and omsol studies. Serum osmol 149  - Vitamin B12, Vitamin D, TSH, and Free T4 ordered f/u with results  - Stopped NS drip  - Continue home donepezil     Hx of Rectal bleeding and diverticulosis  Patient has already been prepped for colonoscopy prior to admission. Will perform inpatient to avoid clean out and reduced intake as a potential cause of dehydration and weakness. Denies current bloody stools.   Colon narrowing concern for mass on CT 5/21.   - Colonoscopy tomorrow, already underwent clean out  - Diet: clear liquids, NPO at midnight  - GI consulted, appreciate recs  - Hemoglobin is at baseline, low concern for active diverticulitis or rectal bleeding     HTN  Elevated BP in the ED, says it fluctuates at home but does not take any HTN medications  - Ordered MAIK hose to help with orthostatic hypotension     GERD  - Home protonix     Mild recurrent major depression ([address]  Anxiety  - Continue home imipramine         Code Status: Full Code  FEN: Clear Liquid, NPO at midnight  PPX: Loveonx starting this evening  PTOT: PT 7/24; OT 15/24  Case Management/Home care: consulted for assistance with living situation  DISPO: PARVEZ Martinez DPM, PGY-1  08/13/21  10:11 AM    This patient has will be staffed and discussed with Danni Ortiz MD.

## 2021-08-13 NOTE — FLOWSHEET NOTE
08/12/21 2120   Vitals   Orthostatic B/P and Pulse?  Yes   Blood Pressure Lying 134/74   Pulse Lying 51 PER MINUTE   Blood Pressure Sitting 115/74   Pulse Sitting 58 PER MINUTE   Blood Pressure Standing   (too weak to stand up)

## 2021-08-13 NOTE — ANESTHESIA PRE PROCEDURE
Department of Anesthesiology  Preprocedure Note       Name:  Anne Marie Hinkle   Age:  68 y.o.  :  1945                                          MRN:  [id number]         Date:  2021      Surgeon: Manoj Powers):  Lisa Henley MD    Procedure: Procedure(s):  COLONOSCOPY DIAGNOSTIC    Medications prior to admission:   Prior to Admission medications    Medication Sig Start Date End Date Taking? Authorizing Provider   Carbidopa-Levodopa ER (RYTARY) 36. MG CPCR Take 1 tablet by mouth 4 times daily 0800, 1200, 1600,    Yes Historical Provider, MD   imipramine (TOFRANIL) 25 MG tablet TAKE 1 TABLET NIGHTLY 21  Yes ROGE Cotton CNP   lidocaine (LIDODERM) 5 % Place 1 patch onto the skin daily 12 hours on, 12 hours off. 21  Yes Cielo Ochoa MD   modafinil (PROVIGIL) 100 MG tablet Take  mg by mouth every morning. 21  Yes Historical Provider, MD   simvastatin (ZOCOR) 10 MG tablet Take 1 tablet by mouth nightly 3/16/21  Yes Chrissie Arenas MD   donepezil (ARICEPT) 5 MG tablet Take 10 mg by mouth nightly  7/15/20  Yes Historical Provider, MD   linaclotide (LINZESS) 290 MCG CAPS capsule Take 290 mcg by mouth every morning (before breakfast)   Yes Historical Provider, MD   clonazePAM (KLONOPIN) 0.5 MG tablet Take 1 mg by mouth nightly    Yes Historical Provider, MD   carbidopa-levodopa (SINEMET)  MG per tablet Take 2 tablets by mouth 4 times daily 0800, 1200, 1600 and .    (with Rytary)   Yes Historical Provider, MD   Cholecalciferol (VITAMIN D3) 2000 UNITS CAPS   Take 1 capsule by mouth daily    Yes Historical Provider, MD   aspirin 81 MG EC tablet   Take 81 mg by mouth three times a week On ,  and    Yes Historical Provider, MD   Polyethylene Glycol 3350 (MIRALAX PO)   Take 17 g by mouth daily as needed    Yes Historical Provider, MD   pantoprazole (PROTONIX) 40 MG tablet   Take 40 mg by mouth daily    Yes Historical Provider, MD       Current Allergies: Allergies   Allergen Reactions    Levofloxacin Swelling     lips swell       Problem List:    Patient Active Problem List   Diagnosis Code    Easy bruising R23.8    Rib pain R07.81    Right foot pain M79.671    Fatigue R53.83    Adenopathy R59.9    Dermatitis L30.9    Abdominal wall pain R10.9    Depression with anxiety F41.8    Costochondritis:left M94.0    Rib pain:left R07.81    Other chest pain R07.89    Degenerative cervical disc M50.30    Degenerative arthritis of thoracic spine M47.814    Chronic back pain M54.9, G89.29    Elevated blood-pressure reading without diagnosis of hypertension R03.0    Arthritis of hand, right M19.041    Cyst in hand:Right [id number]    Mass of right hand R22.30    Dupuytren's disease of palm M72.0    Osteoarthritis of left knee M17.12    BPH (benign prostatic hyperplasia) N40.0    HTN (hypertension) I10    Constipation K59.00    Elevated WBC count D72.829    Prostate problem N42.9    ? CVA (cerebral vascular accident) I63.9    Left arm weakness R29.898    Drooling:left sided K11.7    Bilateral carotid artery stenosis <50% I65.23    Abnormal glucose R73.09    Bilateral hip pain M25.551, M25.552    Impaired fasting glucose R73.01    Osteopenia per CXR M85.80    Irregular heart beats sensation I49.9    Palpitations R00.2    Parkinson disease (HCC) G20    Angina pectoris (HCC) I20.9    Abnormal stress ECG R94.39    Vocal cord paresis J38.00    Dysphagia R13.10    Mild recurrent major depression (HCC) F33.0    Anxiety F41.9    S/P right knee replacement Z96.659    Erectile dysfunction N52.9    Hyperlipidemia, mixed E78.2    Thigh injury S79.929A    Pain of left thigh M79.652    Skin tear of left hand without complication M60.582A    Skin tear of right hand without complication M38.478A    Frequent falls R29.6    Generalized weakness R53.1    Weakness R53.1       Past Medical History:        Diagnosis Date    Anxiety PCP in [place]:Dr. Leon Garner.    Arthritis     Arthritis of hand, right 6/20/2012    Sneed esophagus     Dr. Sarah Schroeder. EGD:1/17/2012. Next EGD due in 3yrs=1/2015    Sneed esophagus     Under care of GI    Bilateral carotid artery stenosis <50% 4/30/2014    BPH (benign prostatic hypertrophy)     Dr. Katie Alexander. PSA per urology.  CAD (coronary artery disease)     under care of cards:Dr. Ricki Woo    Carotid stenosis     Chest pain     pt states he no chest pain in 5 yrs, panic attack    Chronic back pain 1/26/2012    Chronic back pain     under care of ortho spine:Dr. Sweeney    Constipation 8/6/2013    Degenerative arthritis of thoracic spine 1/26/2012    Degenerative cervical disc 1/26/2012    Depression with anxiety 11/15/2011    Klonopin per neurologist(Dr. Jodi Goldberg)    Diverticulosis 1/17/2012    colonoscopy    Elevated PSA     8/21/14;5/2013:under care of Dr. Beltran/[place]:Urology group    Erectile dysfunction 6/13/2017    Essential hypertension, benign 6/20/2012    cardiologist:Dr. Parra Ace    GERD (gastroesophageal reflux disease)     Hemorrhoid     Hiatal hernia     small    Hyperlipidemia     Impaired fasting glucose 5/19/2014    Osteopenia per CXR 5/19/2014    Parkinson disease ([facility] [address]     Dr. Doll Delay Neurology    Renal stone 4/2012    4 mm distal left ureteral calculus:Dr. Beltran:urologist    RLS (restless legs syndrome)     S/P colonoscopy 2011;5/20/19    Dr. Aly Zhang per pt' next is in 10yrs-2021. 5/20/19(Dr. Jose Juan Roblero)    S/P endoscopy 1/2012    Dr. Marck Lake acute changes:+Barrets:next in 3yrs 1/2015    Sigmoidoscopy exam 1/17/2012    Dr. Lama:No acute changes.  Next in 5yrs=1/2017    Therapeutic drug monitoring 5/14/15    Consistent OARRS report on 5/14/15;8/4/15    Thoracic spondylosis     under care of ortho spine:Dr. Freya Amor    Venous insufficiency of leg     Vocal cord paresis 5/11/2016    under care of ENT:Dr. Tonya Luke. s/p vocal cord augmentation 6/2016 at [address]       Past Surgical History:        Procedure Laterality Date    CATARACT REMOVAL      COLONOSCOPY  7894;6310    JOINT REPLACEMENT Right 02/23/2017    Right TKR(knee)    KNEE ARTHROPLASTY Left 7/30/13    LEFT TOTAL KNEE ARTHROPLASTY              KNEE CARTILAGE SURGERY      Left x 2, right x1    LITHOTRIPSY      Kidney stone removal as per urology:Stent removed after 10days    OSTEOTOMY Left     TURP N/A [id number]    TRANSURETHRAL RESECTION OF PROSTATE    UPPER GASTROINTESTINAL ENDOSCOPY  5/07       Social History:    Social History     Tobacco Use    Smoking status: Never Smoker    Smokeless tobacco: Never Used   Substance Use Topics    Alcohol use: Yes     Alcohol/week: 0.0 standard drinks     Comment: occasionally                                Counseling given: Not Answered      Vital Signs (Current):   Vitals:    08/13/21 0907 08/13/21 0912 08/13/21 1157 08/13/21 1507   BP: (!) 200/89 (!) 197/93 (!) 177/84 (!) 161/84   Pulse: 51  55 60   Resp: 12  12 15   Temp: 97.8 °F (36.6 °C)  98 °F (36.7 °C) 98.2 °F (36.8 °C)   TempSrc: Oral  Oral Temporal   SpO2: 95%  95% 98%   Weight:    155 lb (70.3 kg)   Height:    5' 8\" (1.727 m)                                              BP Readings from Last 3 Encounters:   08/13/21 (!) 161/84   08/13/21 (!) 157/74   07/22/21 130/80       NPO Status: Time of last liquid consumption: 2023                        Time of last solid consumption: 1723                        Date of last liquid consumption: 08/12/21                        Date of last solid food consumption: 08/09/21    BMI:   Wt Readings from Last 3 Encounters:   08/13/21 155 lb (70.3 kg)   07/22/21 159 lb (72.1 kg)   07/02/21 160 lb (72.6 kg)     Body mass index is 23.57 kg/m².     CBC:   Lab Results   Component Value Date    WBC 6.4 08/13/2021    RBC 4.19 08/13/2021    HGB 13.4 08/13/2021    HCT 39.7 08/13/2021    MCV 94.8 08/13/2021    RDW 14.5 08/13/2021     08/13/2021       CMP: Lab Results   Component Value Date     08/13/2021    K 3.9 08/13/2021    K 3.8 08/12/2021     08/13/2021    CO2 28 08/13/2021    BUN 8 08/13/2021    CREATININE 0.8 08/13/2021    GFRAA >60 08/13/2021    GFRAA >60 11/01/2012    AGRATIO 1.7 05/21/2021    LABGLOM >60 08/13/2021    GLUCOSE 96 08/13/2021    PROT 7.2 06/23/2021    PROT 7.0 11/01/2012    CALCIUM 9.1 08/13/2021    BILITOT 0.5 06/23/2021    ALKPHOS 80 06/23/2021    AST 19 06/23/2021    ALT <5 06/23/2021       POC Tests: No results for input(s): POCGLU, POCNA, POCK, POCCL, POCBUN, POCHEMO, POCHCT in the last 72 hours. Coags:   Lab Results   Component Value Date    PROTIME 11.7 08/12/2021    INR 1.03 08/12/2021    APTT 33.5 05/18/2021       HCG (If Applicable): No results found for: PREGTESTUR, PREGSERUM, HCG, HCGQUANT     ABGs: No results found for: PHART, PO2ART, ZKF8AII, XFT0FIU, BEART, Y6QQLDFN     Type & Screen (If Applicable):  No results found for: LABABO, LABRH    Drug/Infectious Status (If Applicable):  No results found for: HIV, HEPCAB    COVID-19 Screening (If Applicable): No results found for: COVID19        Anesthesia Evaluation  Patient summary reviewed and Nursing notes reviewed no history of anesthetic complications:   Airway: Mallampati: I  TM distance: <3 FB   Neck ROM: full  Mouth opening: > = 3 FB Dental: normal exam         Pulmonary:Negative Pulmonary ROS and normal exam                               Cardiovascular:    (+) hypertension:, angina:, CAD:, dysrhythmias: atrial fibrillation,       NYHA Classification: III  ECG reviewed  Rhythm: regular  Rate: normal                    Neuro/Psych:   (+) CVA: no interval change, psychiatric history:            GI/Hepatic/Renal:   (+) hiatal hernia, GERD:,           Endo/Other: Negative Endo/Other ROS                    Abdominal:             Vascular: Other Findings:             Anesthesia Plan      MAC     ASA 3       Induction: intravenous.       Anesthetic plan and risks discussed with patient. Plan discussed with CRNA.     Attending anesthesiologist reviewed and agrees with Preprocedure content              Lorraine Alvarez DO   8/13/2021

## 2021-08-13 NOTE — PLAN OF CARE
Problem: Falls - Risk of:  Goal: Will remain free from falls  Description: Will remain free from falls  8/13/2021 0741 by Bree Perez RN  Outcome: Ongoing   Pt will remain free from accidental injury this shift. Pt has fall risk measures (fall risk bracelet, fall risk sign, fall risk cloth, non-slip socks, dome light on) in place. Pt bed is in low position, bed alarm on, bed wheels locked, call light within reach, bedside table within reach, chair wheels locked, chair alarm on.

## 2021-08-13 NOTE — ANESTHESIA PRE PROCEDURE
Allergies: Allergies   Allergen Reactions    Levofloxacin Swelling     lips swell       Problem List:    Patient Active Problem List   Diagnosis Code    Easy bruising R23.8    Rib pain R07.81    Right foot pain M79.671    Fatigue R53.83    Adenopathy R59.9    Dermatitis L30.9    Abdominal wall pain R10.9    Depression with anxiety F41.8    Costochondritis:left M94.0    Rib pain:left R07.81    Other chest pain R07.89    Degenerative cervical disc M50.30    Degenerative arthritis of thoracic spine M47.814    Chronic back pain M54.9, G89.29    Elevated blood-pressure reading without diagnosis of hypertension R03.0    Arthritis of hand, right M19.041    Cyst in hand:Right [id number]    Mass of right hand R22.30    Dupuytren's disease of palm M72.0    Osteoarthritis of left knee M17.12    BPH (benign prostatic hyperplasia) N40.0    HTN (hypertension) I10    Constipation K59.00    Elevated WBC count D72.829    Prostate problem N42.9    ? CVA (cerebral vascular accident) I63.9    Left arm weakness R29.898    Drooling:left sided K11.7    Bilateral carotid artery stenosis <50% I65.23    Abnormal glucose R73.09    Bilateral hip pain M25.551, M25.552    Impaired fasting glucose R73.01    Osteopenia per CXR M85.80    Irregular heart beats sensation I49.9    Palpitations R00.2    Parkinson disease (HCC) G20    Angina pectoris (HCC) I20.9    Abnormal stress ECG R94.39    Vocal cord paresis J38.00    Dysphagia R13.10    Mild recurrent major depression (HCC) F33.0    Anxiety F41.9    S/P right knee replacement Z96.659    Erectile dysfunction N52.9    Hyperlipidemia, mixed E78.2    Thigh injury S79.929A    Pain of left thigh M79.652    Skin tear of left hand without complication D35.756O    Skin tear of right hand without complication Y24.722E    Frequent falls R29.6    Generalized weakness R53.1    Weakness R53.1       Past Medical History:        Diagnosis Date    Anxiety 6/2016 at [address]       Past Surgical History:        Procedure Laterality Date    CATARACT REMOVAL      COLONOSCOPY  9712;2850    JOINT REPLACEMENT Right 02/23/2017    Right TKR(knee)    KNEE ARTHROPLASTY Left 7/30/13    LEFT TOTAL KNEE ARTHROPLASTY              KNEE CARTILAGE SURGERY      Left x 2, right x1    LITHOTRIPSY      Kidney stone removal as per urology:Stent removed after 10days    OSTEOTOMY Left     TURP N/A [id number]    TRANSURETHRAL RESECTION OF PROSTATE    UPPER GASTROINTESTINAL ENDOSCOPY  5/07       Social History:    Social History     Tobacco Use    Smoking status: Never Smoker    Smokeless tobacco: Never Used   Substance Use Topics    Alcohol use: Yes     Alcohol/week: 0.0 standard drinks     Comment: occasionally                                Counseling given: Not Answered      Vital Signs (Current):   Vitals:    08/13/21 0907 08/13/21 0912 08/13/21 1157 08/13/21 1507   BP: (!) 200/89 (!) 197/93 (!) 177/84 (!) 161/84   Pulse: 51  55 60   Resp: 12  12 15   Temp: 36.6 °C (97.8 °F)  36.7 °C (98 °F) 36.8 °C (98.2 °F)   TempSrc: Oral  Oral Temporal   SpO2: 95%  95% 98%   Weight:    155 lb (70.3 kg)   Height:    5' 8\" (1.727 m)                                              BP Readings from Last 3 Encounters:   08/13/21 (!) 161/84   08/13/21 (!) 185/88   07/22/21 130/80       NPO Status: Time of last liquid consumption: 2023                        Time of last solid consumption: 1723                        Date of last liquid consumption: 08/12/21                        Date of last solid food consumption: 08/09/21    BMI:   Wt Readings from Last 3 Encounters:   08/13/21 155 lb (70.3 kg)   07/22/21 159 lb (72.1 kg)   07/02/21 160 lb (72.6 kg)     Body mass index is 23.57 kg/m².     CBC:   Lab Results   Component Value Date    WBC 6.4 08/13/2021    RBC 4.19 08/13/2021    HGB 13.4 08/13/2021    HCT 39.7 08/13/2021    MCV 94.8 08/13/2021    RDW 14.5 08/13/2021     08/13/2021       CMP: Lab Results   Component Value Date     08/13/2021    K 3.9 08/13/2021    K 3.8 08/12/2021     08/13/2021    CO2 28 08/13/2021    BUN 8 08/13/2021    CREATININE 0.8 08/13/2021    GFRAA >60 08/13/2021    GFRAA >60 11/01/2012    AGRATIO 1.7 05/21/2021    LABGLOM >60 08/13/2021    GLUCOSE 96 08/13/2021    PROT 7.2 06/23/2021    PROT 7.0 11/01/2012    CALCIUM 9.1 08/13/2021    BILITOT 0.5 06/23/2021    ALKPHOS 80 06/23/2021    AST 19 06/23/2021    ALT <5 06/23/2021       POC Tests: No results for input(s): POCGLU, POCNA, POCK, POCCL, POCBUN, POCHEMO, POCHCT in the last 72 hours. Coags:   Lab Results   Component Value Date    PROTIME 11.7 08/12/2021    INR 1.03 08/12/2021    APTT 33.5 05/18/2021       HCG (If Applicable): No results found for: PREGTESTUR, PREGSERUM, HCG, HCGQUANT     ABGs: No results found for: PHART, PO2ART, GGI7EEO, VXU3YTR, BEART, [id number]     Type & Screen (If Applicable):  No results found for: LABABO, LABRH    Drug/Infectious Status (If Applicable):  No results found for: HIV, HEPCAB    COVID-19 Screening (If Applicable): No results found for: COVID19        Anesthesia Evaluation  Patient summary reviewed and Nursing notes reviewed  Airway: Mallampati: II  TM distance: >3 FB   Neck ROM: full  Mouth opening: > = 3 FB Dental:          Pulmonary:Negative Pulmonary ROS breath sounds clear to auscultation                             Cardiovascular:    (+) hypertension: moderate,       NYHA Classification: III  ECG reviewed  Rhythm: regular  Rate: normal  Echocardiogram reviewed  Stress test reviewed       Beta Blocker:  Not on Beta Blocker         Neuro/Psych:   (+) CVA: residual symptoms, neuromuscular disease: Parkinson's disease,             GI/Hepatic/Renal: Neg GI/Hepatic/Renal ROS            Endo/Other:    (+) : arthritis: OA., . Pt had no PAT visit       Abdominal:       Abdomen: soft. Vascular:   + PVD, aortic or cerebral, .        Other Findings: Anesthesia Plan      MAC     ASA 3       Induction: intravenous. MIPS: Prophylactic antiemetics administered. Anesthetic plan and risks discussed with patient. Use of blood products discussed with patient whom consented to blood products.      Attending anesthesiologist reviewed and agrees with Preprocedure content              ROGE Davis - ELY   8/13/2021

## 2021-08-13 NOTE — PROGRESS NOTES
Physical Therapy    Facility/Department: [place] [address] [facility]  Initial Assessment and Treatment    NAME: Michell Thurman  : 1945  MRN: [id number]    Date of Service: 2021    Discharge Recommendations:  Michell Thurman scored a 7/24 on the AM-PAC short mobility form. Current research shows that an AM-PAC score of 17 or less is typically not associated with a discharge to the patient's home setting. Based on the patient's AM-PAC score and their current functional mobility deficits, it is recommended that the patient have 3-5 sessions per week of Physical Therapy at d/c to increase the patient's independence. Please see assessment section for further patient specific details. PT Equipment Recommendations  Equipment Needed: No    Assessment   Assessment: Pt admitted for weakness and inability to ambulate after undergoing colonoscopy prep at home and not being able to take his Parkinson's meds. Pt demonstrates transfers and gait well below functional baseline, requiring 2 person assist for safe mobility. Pt unable to tolerate ambulation beyond bedside chair. Hopefullly pt will improve once meds are restarted, however anticipate pt may need ongoing skilled PT upon d/c to increase strength for functional independence. Will continue to follow. Treatment Diagnosis: Decreased gait due to weakness. Decision Making: High Complexity  Patient Education: Role of PT and activity promotion with RN staff. Pt verbalized understanding. REQUIRES PT FOLLOW UP: Yes         Restrictions  Up with assist     Vision/Hearing  Vision: Within Functional Limits (reading glasses)  Hearing: Within functional limits       Subjective  Chart Reviewed: Yes  Additional Pertinent Hx: Pt to ED  with fatigue and unable to ambulate post prep for colonoscopy. Pt admitted for weakness. PMH:  OA, CAD, DDD, HTN, back pain, depression with anxiety, osteopenia, Parkinson's disease, RLS, vocal cord paresis.   Diagnosis: Weakness    Subjective  Subjective: Pt found supine in bed. Pt's voice barely audible with whispers. Pt agreeable for PT. Wife arrived mid-session. Pain Screening  Patient Currently in Pain: Denies (\"discomfort\"  chronic back pain)    Orientation  Within Functional Limits    Social/Functional History  Lives With: Spouse  Type of Home: House  Home Layout: Able to Live on Main level with bedroom/bathroom  Home Access: Stairs to enter without rails (1)  Bathroom Shower/Tub: Tub/Shower unit  Bathroom Toilet: Handicap height  Bathroom Equipment: Grab bars in shower  Home Equipment: Rolling walker, Cane  ADL Assistance: Needs assistance (wife assists as needed)  Homemaking Assistance: Needs assistance (wife does homemaking)  Ambulation Assistance: Independent (uses cane or walker \"sometimes\"; wife provides assist to walker \"sometimes\".)  Transfer Assistance: Independent  Active : No  Occupation: Retired ()  Additional Comments: Pt reports falls at home, unable to receall details. Wife is available to assist at home. Pt attributes increased weakness from colonoscopy prep and not being able to take meds. Objective  Strength  Strength RLE: WFL  Strength LLE: WFL    Bed mobility  Supine to Sit: 2 Person assistance (Max A x2, HOB raised)     Transfers  Sit to Stand: 2 Person Assistance (Mod A x2 from EOB to walker, progressing to Mod A x1 from chair. Cues for hand placement.   Posterior lean)  Stand to sit: Moderate Assistance (cues/assist to reach back prior to sitting down, increased time to lower self.)  Bed to Chair: 2 Person Assistance (Mod A x2, stand step pivot to chair)     Ambulation  Device: Rolling Walker  Assistance: 2 Person assistance (Mod A x2)  Quality of Gait: verbal cues for iniation of each step, tremors, assist with walker management, posterior lean  Gait Deviations: Decreased step length;Decreased step height;Shuffles  Distance: 3ft to chair    Balance  Sitting - Static: Good  Sitting - Dynamic: Good  Standing - Static: Poor (Max A initially, progressing to CGA/Min A with UE support of walker)  Standing - Dynamic: Poor (Mod A x2 for transfers with walker)    Treatment included gait and transfer training with patient education     Plan   Times per week: 2-5  Current Treatment Recommendations: Transfer Training, Gait Training, Functional Mobility Training, Strengthening    Safety Devices  Type of devices: Left in chair, Chair alarm in place, Call light within reach, Nurse notified (wife in room)      AM-PAC Score  AM-PAC Inpatient Mobility Raw Score : 7 (08/13/21 1105)  AM-PAC Inpatient T-Scale Score : 26.42 (08/13/21 1105)  Mobility Inpatient CMS 0-100% Score: 92.36 (08/13/21 1105)  Mobility Inpatient CMS G-Code Modifier : CM (08/13/21 1105)     Goals  Short term goals  Time Frame for Short term goals: Discharge  Short term goal 1: supine <> sit CGA  Short term goal 2: sit <> stand CGA  Short term goal 3: bed <> chair CGA  Short term goal 4: ambulate 50ft with rolling walker CGA  Patient Goals   Patient goals : Not stated       Therapy Time   Individual Concurrent Group Co-treatment   Time In 1005         Time Out 1044         Minutes 39         Timed Code Treatment Minutes:  25  Total Treatment Minutes:  Shelly 35, PT

## 2021-08-13 NOTE — PROGRESS NOTES
4 Eyes Admission Assessment     I agree as the admission nurse that 2 RN's have performed a thorough Head to Toe Skin Assessment on the patient. ALL assessment sites listed below have been assessed on admission. Areas assessed by both nurses:   [x]   Head, Face, and Ears   [x]   Shoulders, Back, and Chest  [x]   Arms, Elbows, and Hands   [x]   Coccyx, Sacrum, and Ischium  [x]   Legs, Feet, and Heels        Does the Patient have Skin Breakdown?   No         Michael Prevention initiated:  No   Wound Care Orders initiated:  No      [facility] nurse consulted for Pressure Injury (Stage 3,4, Unstageable, DTI, NWPT, and Complex wounds) or Michael score 18 or lower:  No      Nurse 1 eSignature: Electronically signed by Ila Thompson RN on 8/12/21 at 10:21 PM EDT    **SHARE this note so that the co-signing nurse is able to place an eSignature**    Nurse 2 eSignature: Electronically signed by Ignacia Adair RN on 8/12/21 at 8:29 PM EDT

## 2021-08-13 NOTE — PROGRESS NOTES
Talked to resident that BP is >than 200, hr dropped to mid 30's this morning and we can't give the lebetalol because of the HR parameters, pt initially responsive to questions, but then only whispering and hardly opening eyes. Pt refusing medication, stating he can't take them, but is no longer responding to why he can't take them.

## 2021-08-13 NOTE — CONSULTS
GI Consult Note      Admission Date: 8/12/2021  Hospital Day: Hospital Day: 2  Attending: Darcy Dave MD  Date of service: 8/13/21    Subjective:     Chief complaint/ Reason for consult:   Hematochezia       HPI: Lauren Damon is a 68 y.o.  male patient, who was seen at the request of Dr. Darcy Dave MD.    History was obtained from chart review and the patient. This 77-year-old male with history of GERD and Sneed's esophagus who was admitted from our endoscopy center for complaints of weakness and dizziness. Patient had presented to our outpatient surgery center where he was scheduled for colonoscopy with my partner Dr. Kurt Crawford for further evaluation of hematochezia. He had a 2-day prep and prolonged n.p.o. status and was quite weak and unable to walk. It was decided that he should present to the ER for further evaluation. In the ER he was found to be anemic with a hemoglobin of 13.4. He does have complaints of hematochezia. CT scan in May was notable for:    Evidence of acute diverticulitis of sigmoid colon. However, mucosal thickening and luminal narrowing which simulates a shelf is noted. Follow-up endoscopic evaluation is recommended to exclude underlying mural lesion or tumor. Patient describes hematochezia for 1 month. He has had a few pounds weight loss he states he denies any abdominal pain. Past Endoscopic History:  Last colonoscopy 5/2019 [facility]):    Slightly nodular non enlarged prostate  Poor prep in cecum unable to suction due to continual clogging of scope  No lesions seen fair prep in sigmoid good elsewhere except cecum  Mild left sided diverticulosis  Moderate internal hemorrhoids  Recommendations  CTC tomorrow after more prep today    5/2019 virtual colon CT  Scan: fair amt of debris, no tumor or polyps, tics.                      Past Medical History:     Past Medical History:   Diagnosis Date    Anxiety     PCP in [place]:Dr. Porsha Gilliam.  Arthritis     Arthritis of hand, right 6/20/2012    Sneed esophagus     Dr. Juany Quintanilla. EGD:1/17/2012. Next EGD due in 3yrs=1/2015    Sneed esophagus     Under care of GI    Bilateral carotid artery stenosis <50% 4/30/2014    BPH (benign prostatic hypertrophy)     Dr. Kiarra Rothman. PSA per urology.  CAD (coronary artery disease)     under care of cards:Dr. Stevenson Back    Carotid stenosis     Chest pain     pt states he no chest pain in 5 yrs, panic attack    Chronic back pain 1/26/2012    Chronic back pain     under care of ortho spine:Dr. Sweeney    Constipation 8/6/2013    Degenerative arthritis of thoracic spine 1/26/2012    Degenerative cervical disc 1/26/2012    Depression with anxiety 11/15/2011    Klonopin per neurologist(Dr. Belen Calderon)    Diverticulosis 1/17/2012    colonoscopy    Elevated PSA     8/21/14;5/2013:under care of Dr. Beltran/[place]:Urology group    Erectile dysfunction 6/13/2017    Essential hypertension, benign 6/20/2012    cardiologist:Dr. Damien Moreno    GERD (gastroesophageal reflux disease)     Hemorrhoid     Hiatal hernia     small    Hyperlipidemia     Impaired fasting glucose 5/19/2014    Osteopenia per CXR 5/19/2014    Parkinson disease ([facility] [address]     Dr. Kasia Faulkner Neurology    Renal stone 4/2012    4 mm distal left ureteral calculus:Dr. Beltran:urologist    RLS (restless legs syndrome)     S/P colonoscopy 2011;5/20/19    Dr. Stiven Cabezas per pt' next is in 10yrs-2021. 5/20/19(Dr. Sarah Paul)    S/P endoscopy 1/2012    Dr. Bustillo Fuelling acute changes:+Barrets:next in 3yrs 1/2015    Sigmoidoscopy exam 1/17/2012    Dr. Lama:No acute changes.  Next in 5yrs=1/2017    Therapeutic drug monitoring 5/14/15    Consistent OARRS report on 5/14/15;8/4/15    Thoracic spondylosis     under care of ortho spine:Dr. Naren Montgomery    Venous insufficiency of leg     Vocal cord paresis 5/11/2016    under care of ENT:Dr. Alberto Payne. s/p vocal cord augmentation 6/2016 at [address]       Past Surgical History:    Past Surgical History:   Procedure Laterality Date    CATARACT REMOVAL      COLONOSCOPY  8934;7941    JOINT REPLACEMENT Right 02/23/2017    Right TKR(knee)    KNEE ARTHROPLASTY Left 7/30/13    LEFT TOTAL KNEE ARTHROPLASTY              KNEE CARTILAGE SURGERY      Left x 2, right x1    LITHOTRIPSY      Kidney stone removal as per urology:Stent removed after 10days    OSTEOTOMY Left     TURP N/A [id number]    TRANSURETHRAL RESECTION OF PROSTATE    UPPER GASTROINTESTINAL ENDOSCOPY  5/07         Social History:     · Tobacco use:   reports that he has never smoked. He has never used smokeless tobacco.  · Alcohol use:   reports current alcohol use. · Currently lives in: [facility]  ·  reports no history of drug use. Family History:       Problem Relation Age of Onset    Cancer Sister 68        breast     Heart Disease Sister     Kidney Disease Father 61    Alcohol Abuse Father     Coronary Art Dis Mother 80    Coronary Art Dis Brother 77           Medications:    carbidopa-levodopa  2 tablet Oral 4x Daily    Carbidopa-Levodopa ER  1 tablet Oral 4x Daily    aspirin  81 mg Oral Once per day on Mon Wed Fri    Vitamin D  2,000 Units Oral Daily    donepezil  5 mg Oral Nightly    imipramine  25 mg Oral Nightly    pantoprazole  40 mg Oral Daily    atorvastatin  10 mg Oral Nightly    sodium chloride flush  5-40 mL Intravenous 2 times per day    enoxaparin  40 mg Subcutaneous Daily            REVIEW OF SYSTEMS:       Pertinent items are noted in HPI.     Objective:   PHYSICAL EXAM:      Vitals:   Vitals:    08/13/21 0907 08/13/21 0912 08/13/21 1157 08/13/21 1507   BP: (!) 200/89 (!) 197/93 (!) 177/84 (!) 161/84   Pulse: 51  55 60   Resp: 12  12 15   Temp: 97.8 °F (36.6 °C)  98 °F (36.7 °C) 98.2 °F (36.8 °C)   TempSrc: Oral  Oral Temporal   SpO2: 95%  95% 98%   Weight:    155 lb (70.3 kg)   Height:    5' 8\" (1.727 m)       General appearance: alert, cooperative, no distress, appears stated age  Eyes: Anicteric  Head: Normocephalic, without obvious abnormality  Lungs: clear to auscultation bilaterally, Normal Effort  Heart: regular rate and rhythm, normal S1 and S2, no murmurs or rubs  Abdomen: soft, non-tender. Bowel sounds normal. No masses,  no organomegaly. Extremities: atraumatic, no cyanosis or edema  Skin: warm and dry, no jaundice  Neuro: Grossly intact, A&OX3    Intake and output:   I/O last 3 completed shifts: In: 61 [P.O.:60]  Out: 760 [Urine:760]    Lab Data:      CBC:   Recent Labs     08/12/21  1123 08/13/21  0527   WBC 8.8 6.4   RBC 4.53 4.19*   HGB 14.4 13.4*   HCT 43.5 39.7*    205   MCV 96.1 94.8   MCH 31.8 31.9   MCHC 33.1 33.6   RDW 14.8 14.5        BMP:  Recent Labs     08/12/21  1123 08/13/21  0527   * 141   K 3.8 3.9   CL 97* 103   CO2 28 28   BUN 11 8   CREATININE 0.8 0.8   CALCIUM 10.1 9.1   GLUCOSE 116* 96        Hepatic Function Panel:   No results for input(s): AST, ALT, BILIDIR, BILITOT, ALKPHOS in the last 72 hours. No results for input(s): LIPASE, AMYLASE in the last 72 hours. Recent Labs     08/12/21  1123   PROTIME 11.7   INR 1.03     No results for input(s): PTT in the last 72 hours. No results for input(s): OCCULTBLD in the last 72 hours. Imaging:    CT HEAD WO CONTRAST   Final Result      No acute intracranial abnormality. XR CHEST (2 VW)   Final Result      Linear density in the left lower lung-atelectasis/scarring is favored over pneumonia. Right lung is clear. Normal cardiomediastinal silhouette. Known drug Allergies: Allergies   Allergen Reactions    Levofloxacin Swelling     lips swell           Assessment:   The patient is a 68 y.o. old male  with following problems:    1. Hematochezia  2. Anemia hemoglobin 13.4 today  3.   Abnormal imaging of the GI tract-CT scan from May shows acute diverticulitis in the sigmoid colon but mucosal thickening and luminal narrowing which forms

## 2021-08-13 NOTE — ANESTHESIA POSTPROCEDURE EVALUATION
Department of Anesthesiology  Postprocedure Note    Patient: Terrell Myers  MRN: [id number]  YOB: 1945  Date of evaluation: 8/13/2021  Time:  5:08 PM     Procedure Summary     Date: 08/13/21 Room / Location: [facility]    Anesthesia Start: 1633 Anesthesia Stop: 1703    Procedure: COLONOSCOPY DIAGNOSTIC Diagnosis: (HEMATOCHEZIA)    Surgeons: Marilou Bowling MD Responsible Provider: Elijah Bell DO    Anesthesia Type: MAC ASA Status: 3          Anesthesia Type: MAC    Nagi Phase I: Nagi Score: 10    Nagi Phase II:      Last vitals: Reviewed and per EMR flowsheets.        Anesthesia Post Evaluation    Patient location during evaluation: bedside  Patient participation: complete - patient participated  Level of consciousness: awake and alert  Airway patency: patent  Nausea & Vomiting: no nausea and no vomiting  Complications: no  Cardiovascular status: hemodynamically stable  Respiratory status: acceptable  Hydration status: stable

## 2021-08-13 NOTE — PROGRESS NOTES
Messaged MD that GI signed off to discharge pt. MD stated they will continue to monitor pt over night.

## 2021-08-13 NOTE — PROGRESS NOTES
Patient admitted to room 4322 from ED. Patient oriented to room, call light, bed rails, phone, lights and bathroom. Patient instructed about the schedule of the day including: vital sign frequency, lab draws, possible tests, frequency of MD and staff rounds, daily weights, I &O's and prescribed diet. bed alarm in place, patient aware of placement and reason. Bed locked, in lowest position, side rails up 2/4, call light within reach.

## 2021-08-13 NOTE — PROGRESS NOTES
Messaged Dr. Karena Glover that pt medical records were not obtained from [facility] neuroscience. Practice was already closed by the time the nursing communication was placed. I attempted twice to get them and contacted the on call Dr. Misael Vieira stated the practice could get them to Dr. Karena Glover Monday.

## 2021-08-13 NOTE — PROGRESS NOTES
Occupational Therapy   Occupational Therapy Initial Assessment/ Treatment  Date: 2021   Patient Name: Monica Dale  MRN: [id number]     : 1945    Date of Service: 2021    Discharge Recommendations:     OT Equipment Recommendations  Equipment Needed: No  Other: defer  Monica Dale scored a 15/24 on the AM-PAC ADL Inpatient form. Current research shows that an AM-PAC score of 17 or less is typically not associated with a discharge to the patient's home setting. Based on the patient's AM-PAC score and their current ADL deficits, it is recommended that the patient have 3-5 sessions per week of Occupational Therapy at d/c to increase the patient's independence. Please see assessment section for further patient specific details. If patient discharges prior to next session this note will serve as a discharge summary. Please see below for the latest assessment towards goals. Assessment   Performance deficits / Impairments: Decreased functional mobility ; Decreased endurance;Decreased ADL status; Decreased balance;Decreased strength;Decreased high-level IADLs;Decreased cognition  Assessment: Prior to admission pt was living at home with his wife, wife was assisting with some ADLs and completing all IADLs. Pt now presents s/p weakness, now below his baseline. Pt demonstrating mod A x 1-2 for sit to stand, max A for LB dressing and setup for seated grooming. Pt reported dizziness upon standing, orthostat BP values listed below. Pt would benefit from continued OT while in acute care, AMPA score indicates non homebound discharge. Continue OT POC.   Treatment Diagnosis: decreased ADLs and transfers secondary to weakness  Prognosis: Fair  Decision Making: Medium Complexity  OT Education: OT Role;Plan of Care  Patient Education: verb understanding  REQUIRES OT FOLLOW UP: Yes  Activity Tolerance  Activity Tolerance: Patient Tolerated treatment well;Patient limited by fatigue  Activity Tolerance: reported slight dizziness while standing. Reported min fatigue after transfer. pt tearful at end of session  Safety Devices  Safety Devices in place: Yes  Type of devices: Left in chair;Nurse notified;Call light within reach; Chair alarm in place           Patient Diagnosis(es): The primary encounter diagnosis was Generalized weakness. A diagnosis of Unable to ambulate was also pertinent to this visit. has a past medical history of Anxiety, Arthritis, Arthritis of hand, right, Sneed esophagus, Sneed esophagus, Bilateral carotid artery stenosis <50%, BPH (benign prostatic hypertrophy), CAD (coronary artery disease), Carotid stenosis, Chest pain, Chronic back pain, Chronic back pain, Constipation, Degenerative arthritis of thoracic spine, Degenerative cervical disc, Depression with anxiety, Diverticulosis, Elevated PSA, Erectile dysfunction, Essential hypertension, benign, GERD (gastroesophageal reflux disease), Hemorrhoid, Hiatal hernia, Hyperlipidemia, Impaired fasting glucose, Osteopenia per CXR, Parkinson disease ([facility] [address] Renal stone, RLS (restless legs syndrome), S/P colonoscopy, S/P endoscopy, Sigmoidoscopy exam, Therapeutic drug monitoring, Thoracic spondylosis, Venous insufficiency of leg, and Vocal cord paresis. has a past surgical history that includes Cataract removal; Colonoscopy (4512;1545); Upper gastrointestinal endoscopy (5/07); Knee cartilage surgery; Lithotripsy; osteotomy (Left); Knee Arthroplasty (Left, 7/30/13); TURP (N/A, [id number]); and joint replacement (Right, 02/23/2017). Treatment Diagnosis: decreased ADLs and transfers secondary to weakness      Restrictions  Position Activity Restriction  Other position/activity restrictions: Up with assist    Subjective   General  Chart Reviewed: Yes  Additional Pertinent Hx: Pt admitted to ED with c/o weakness and difficulty standing-- pt was prepping for colonoscopy and was NPO.  Pt also had been taking less of his parkinson's medications in preparation Shower/Tub: Tub/Shower unit  Bathroom Toilet: Handicap height  Bathroom Equipment: Grab bars in shower  Home Equipment: Rolling walker, Cane  ADL Assistance: Needs assistance (wife assists as needed)  Homemaking Assistance: Needs assistance (wife does homemaking)  Ambulation Assistance: Independent (uses cane or walker \"sometimes\"; wife provides assist to walker \"sometimes\".)  Transfer Assistance: Independent  Active : No  Occupation: Retired ()  Additional Comments: Pt reports falls at home, unable to receall details. Wife is available to assist at home. Pt attributes increased weakness from colonoscopy prep and not being able to take meds. Objective        Orientation  Overall Orientation Status: Within Functional Limits     Balance  Sitting Balance: Contact guard assistance  Standing Balance: Dependent/Total (mod A x 2)  Standing Balance  Time: 10 mins total  Activity: standing at RW, standing at urinal and stand step transfer from bed to chair  Functional Mobility  Functional - Mobility Device: Rolling Walker  Activity: Other (stand step transfer from bed to chair)  Assist Level: Dependent/Total (mod A x 2)  ADL  Feeding: NPO  Grooming: Setup (washing face while seated in bedside chair)  UE Dressing: Minimal assistance (donning gown, managing around IV)  LE Dressing: Maximum assistance (assist to thread LEs, pt able to pull up with min A in stance)  Toileting: Moderate assistance (assist x 1 to stand, assist from wife to manage urinal)  Coordination  Movements Are Fluid And Coordinated: No        Transfers  Sit to stand: 2 Person assistance; Moderate assistance  Stand to sit: 2 Person assistance; Moderate assistance  Transfer Comments: with posterior lean     Cognition  Overall Cognitive Status: Exceptions  Arousal/Alertness: Delayed responses to stimuli  Following Commands: Follows one step commands with increased time; Follows one step commands with repetition  Attention Span: Attends with cues to redirect  Safety Judgement: Good awareness of safety precautions  Problem Solving: Assistance required to generate solutions;Assistance required to implement solutions  Insights: Decreased awareness of deficits  Initiation: Requires cues for some  Sequencing: Requires cues for some        Treatment included functional transfer training, ADLs, and patient education. Plan   Plan  Times per week: 2-5  Times per day: Daily  Current Treatment Recommendations: Strengthening, Balance Training, Functional Mobility Training, Endurance Training, Self-Care / ADL, Neuromuscular Re-education, Patient/Caregiver Education & Training    AM-PAC Score        AM-[facility] Inpatient Daily Activity Raw Score: 15 (08/13/21 1124)  AM-PAC Inpatient ADL T-Scale Score : 34.69 (08/13/21 1124)  ADL Inpatient CMS 0-100% Score: 56.46 (08/13/21 1124)  ADL Inpatient CMS G-Code Modifier : CK (08/13/21 1124)    Goals  Short term goals  Time Frame for Short term goals: by dc  Short term goal 1: Pt will complete standing level grooming with SBA  Short term goal 2: Pt will complete toileting with SBA  Short term goal 3: Pt will complete BSC and functional transfers with min A  Patient Goals   Patient goals : to get the colonoscopy done       Therapy Time   Individual Concurrent Group Co-treatment   Time In 1001         Time Out 1045         Minutes 44         Timed Code Treatment Minutes: 29 Minutes (+15 min eval)     Sandi MORALES  [address] [facility], OTR/L [id number]

## 2021-08-13 NOTE — PROCEDURES
[address] [place] [address]  Colonoscopy Note    Patient: Terrell Myers  : 1945  Acct#:     Procedure: Colonoscopy     Date:  2021    Surgeon:  Louise Lewis MD, MD    Referring Physician:  Darin Nova MD    Anesthesia: IV propofol, per anesthesia    EBL: <50 mL    Indications: This is a 68y.o. year old male who presents today with hematochezia. Procedure: An informed consent was obtained from the patient after explanation of indications, benefits, possible risks and complications of the procedure. The patient was then taken to the endoscopy suite, placed in the left lateral decubitus position, and the above IV anesthesia was administered. A digital rectal examination was performed and revealed negative without mass, lesions or tenderness. The Olympus PCFQ-H190 video colonoscope was placed in the patient's rectum under digital direction and advanced to the cecum. The cecum was identified by characteristic anatomy and ballottment. The prep was poor. Findings:    The prep was poor. After copious irrigation, suctioning and movement of solid stool, the views were fair. Extensive diverticulosis. The scope was then withdrawn into the rectum and retroflexed. The retroflexed view of the anal verge and rectum demonstrates large internal hemorrhoids. The scope was straightened, the colon was decompressed and the scope was withdrawn from the patient. The patient tolerated the procedure well and was taken to the PACU in good condition. Biopsies: No       Impression:    1. The prep was poor. After copious irrigation, suctioning and movement of solid stool, the views were fair. 2. Extensive diverticulosis. 3. Large internal hemorrhoids. Recommendations:  Ok to discharge to home from GI standpoint.          Louise Lewis, [address] [facility]  2021

## 2021-08-13 NOTE — PROGRESS NOTES
Pharmacy  Note  - Admission Medication History    List of [id number] medications is complete. I reviewed Rx fill history via \"Complete Dispense Report\" in [employer], and spoke to patient's wife on phone. Discussed with Dr Rogelio Palumbo- I modified Parkinson's medication orders to match home regimen. Pt's wife will bring supply of Rytary      The following changes made to [id number]-[id number] medication list:    ADDED:   1) Rytary (extended release carbidopa-levodopa). Takes 36.25/ 125 mg x1 tablet, 4 times daily at 0800, 1200, 1600, 2000. Dose or Frequency CHANGE:  1) Sinemet (carbidopa/levodopa immediate-release) - Takes 25/100 mg x 2 tabs,  4 times daily at 0800, 1200, 1600, 2000. REMOVED:  1) Sinemet XR      Please call with questions:  [phone] ([facility])    Chip Dominique. Iris RIVERA   8/13/2021 3:24 PM

## 2021-08-13 NOTE — PROGRESS NOTES
Messaged resident to change siminet order back to the 3 tabs 4 times a day since pt doesn't have the carbidopa-levodopa ER 36. mg 4 at the hospital.

## 2021-08-13 NOTE — CARE COORDINATION
Case Management Assessment           Daily Note                 Date/ Time of Note: 8/13/2021 3:47 PM         Note completed by: JUSTICE Bauer    Patient Name: Zena Lauren  YOB: 1945    Diagnosis:Weakness [R53.1]  Generalized weakness [R53.1]  Unable to ambulate [R26.2]  Patient Admission Status: Inpatient    Date of Admission:8/12/2021 10:50 AM Length of Stay: 1 GLOS:      Current Plan of Care: down for colonoscopy, elevated BP, continue PT/OT  ________________________________________________________________________________________  PT AM-PAC: 7 / 24 per last evaluation on: today    OT AM-PAC: 15 / 24 per last evaluation on: today    DME Needs for discharge: TBD  ________________________________________________________________________________________  Discharge Plan: hopeful return home w/spouse    Tentative discharge date: TBD    Current barriers to discharge: medical clearance; work with PT;     Referrals completed: may need skilled Home Care    Resources/ information provided:   ________________________________________________________________________________________  Case Management Notes:  Pt currently down for colonoscopy now and wife not in room - SW not able to do full assessment at this time but aware that Pt lives at home with wife. Pt's AMPAC scores are low today but therapy is hopeful that Pt will improve once Parkinson meds are restarted plus fluids due to colon prep. SW will follow Pt's progress and assist with appropriate DC plan and needs. Deisy Hall and his family were provided with choice of provider; he and his family are in agreement with the discharge plan.     Care Transition Patient: Denise Arias  [facility]  Case Management Department  Ph: [phone]

## 2021-08-14 LAB
ANION GAP SERPL CALCULATED.3IONS-SCNC: 9 MMOL/L (ref 3–16)
BASOPHILS ABSOLUTE: 0 K/UL (ref 0–0.2)
BASOPHILS RELATIVE PERCENT: 0.5 %
BUN BLDV-MCNC: 11 MG/DL (ref 7–20)
CALCIUM SERPL-MCNC: 9.2 MG/DL (ref 8.3–10.6)
CHLORIDE BLD-SCNC: 102 MMOL/L (ref 99–110)
CO2: 22 MMOL/L (ref 21–32)
CREAT SERPL-MCNC: 0.6 MG/DL (ref 0.8–1.3)
EKG ATRIAL RATE: 63 BPM
EKG DIAGNOSIS: NORMAL
EKG P AXIS: 27 DEGREES
EKG P-R INTERVAL: 116 MS
EKG Q-T INTERVAL: 420 MS
EKG QRS DURATION: 80 MS
EKG QTC CALCULATION (BAZETT): 429 MS
EKG R AXIS: 13 DEGREES
EKG T AXIS: 53 DEGREES
EKG VENTRICULAR RATE: 63 BPM
EOSINOPHILS ABSOLUTE: 0.2 K/UL (ref 0–0.6)
EOSINOPHILS RELATIVE PERCENT: 3.2 %
GFR AFRICAN AMERICAN: >60
GFR NON-AFRICAN AMERICAN: >60
GLUCOSE BLD-MCNC: 90 MG/DL (ref 70–99)
HCT VFR BLD CALC: 41 % (ref 40.5–52.5)
HEMOGLOBIN: 14 G/DL (ref 13.5–17.5)
LYMPHOCYTES ABSOLUTE: 1.7 K/UL (ref 1–5.1)
LYMPHOCYTES RELATIVE PERCENT: 24.2 %
MCH RBC QN AUTO: 32.4 PG (ref 26–34)
MCHC RBC AUTO-ENTMCNC: 34 G/DL (ref 31–36)
MCV RBC AUTO: 95.3 FL (ref 80–100)
MONOCYTES ABSOLUTE: 0.9 K/UL (ref 0–1.3)
MONOCYTES RELATIVE PERCENT: 12.9 %
NEUTROPHILS ABSOLUTE: 4 K/UL (ref 1.7–7.7)
NEUTROPHILS RELATIVE PERCENT: 59.2 %
PDW BLD-RTO: 14.3 % (ref 12.4–15.4)
PLATELET # BLD: 162 K/UL (ref 135–450)
PLATELET SLIDE REVIEW: ADEQUATE
PMV BLD AUTO: 8.6 FL (ref 5–10.5)
POTASSIUM SERPL-SCNC: 3.9 MMOL/L (ref 3.5–5.1)
POTASSIUM SERPL-SCNC: 5.7 MMOL/L (ref 3.5–5.1)
RBC # BLD: 4.3 M/UL (ref 4.2–5.9)
SODIUM BLD-SCNC: 133 MMOL/L (ref 136–145)
T4 FREE: 1.5 NG/DL (ref 0.9–1.8)
VITAMIN B-12: 639 PG/ML (ref 211–911)
VITAMIN D 25-HYDROXY: 52.8 NG/ML
WBC # BLD: 6.8 K/UL (ref 4–11)

## 2021-08-14 PROCEDURE — 80048 BASIC METABOLIC PNL TOTAL CA: CPT

## 2021-08-14 PROCEDURE — 6370000000 HC RX 637 (ALT 250 FOR IP): Performed by: INTERNAL MEDICINE

## 2021-08-14 PROCEDURE — 36415 COLL VENOUS BLD VENIPUNCTURE: CPT

## 2021-08-14 PROCEDURE — 6370000000 HC RX 637 (ALT 250 FOR IP): Performed by: STUDENT IN AN ORGANIZED HEALTH CARE EDUCATION/TRAINING PROGRAM

## 2021-08-14 PROCEDURE — 2580000003 HC RX 258: Performed by: STUDENT IN AN ORGANIZED HEALTH CARE EDUCATION/TRAINING PROGRAM

## 2021-08-14 PROCEDURE — 6360000002 HC RX W HCPCS: Performed by: STUDENT IN AN ORGANIZED HEALTH CARE EDUCATION/TRAINING PROGRAM

## 2021-08-14 PROCEDURE — 85025 COMPLETE CBC W/AUTO DIFF WBC: CPT

## 2021-08-14 PROCEDURE — 2060000000 HC ICU INTERMEDIATE R&B

## 2021-08-14 PROCEDURE — 84132 ASSAY OF SERUM POTASSIUM: CPT

## 2021-08-14 RX ADMIN — DONEPEZIL HYDROCHLORIDE 5 MG: 5 TABLET, FILM COATED ORAL at 20:29

## 2021-08-14 RX ADMIN — Medication 2000 UNITS: at 08:35

## 2021-08-14 RX ADMIN — IMIPRAMINE HYDROCHLORIDE 25 MG: 25 TABLET ORAL at 20:29

## 2021-08-14 RX ADMIN — Medication 10 ML: at 20:29

## 2021-08-14 RX ADMIN — ATORVASTATIN CALCIUM 10 MG: 10 TABLET, FILM COATED ORAL at 20:29

## 2021-08-14 RX ADMIN — ENOXAPARIN SODIUM 40 MG: 40 INJECTION SUBCUTANEOUS at 08:35

## 2021-08-14 RX ADMIN — Medication 10 ML: at 08:35

## 2021-08-14 RX ADMIN — PANTOPRAZOLE SODIUM 40 MG: 40 TABLET, DELAYED RELEASE ORAL at 08:35

## 2021-08-14 RX ADMIN — CARBIDOPA AND LEVODOPA 2 TABLET: 25; 100 TABLET ORAL at 20:29

## 2021-08-14 RX ADMIN — CARBIDOPA AND LEVODOPA 2 TABLET: 25; 100 TABLET ORAL at 12:24

## 2021-08-14 RX ADMIN — CARBIDOPA AND LEVODOPA 2 TABLET: 25; 100 TABLET ORAL at 16:45

## 2021-08-14 RX ADMIN — CARBIDOPA AND LEVODOPA 2 TABLET: 25; 100 TABLET ORAL at 08:35

## 2021-08-14 ASSESSMENT — PAIN SCALES - GENERAL
PAINLEVEL_OUTOF10: 0

## 2021-08-14 NOTE — CARE COORDINATION
CM following, DC order noted, in to speak to pt and wife, due to PT OT scores low. Pt and wife refuse SNF stating he is deconditioned due to not getting at all then getting the wrong lower dose of Sinemet since in hospital.  Spoke with Dr Isha Mckeon and he wants PT OT to see before DC after he is had the correct dose of Sinemet. Message sent to the Therapy manager for pt to be seen as he is DC dependent.   Electronically signed by Jm Connors RN on 8/14/2021 at 5:05 PM  [phone]

## 2021-08-14 NOTE — PROGRESS NOTES
Progress Note    Admit Date: 8/12/2021  Diet: ADULT DIET; Regular    CC: weakness    Interval history: feels much better today. Is more active and talkative. Has tremor and stuttering speech. No other complaints. States that he is near baseline. Medications:     Scheduled Meds:   carbidopa-levodopa  2 tablet Oral 4x Daily    Carbidopa-Levodopa ER  1 tablet Oral 4x Daily    aspirin  81 mg Oral Once per day on Mon Wed Fri    Vitamin D  2,000 Units Oral Daily    donepezil  5 mg Oral Nightly    imipramine  25 mg Oral Nightly    pantoprazole  40 mg Oral Daily    atorvastatin  10 mg Oral Nightly    sodium chloride flush  5-40 mL Intravenous 2 times per day    enoxaparin  40 mg Subcutaneous Daily     Continuous Infusions:   sodium chloride 100 mL/hr at 08/13/21 1533    sodium chloride       PRN Meds:sodium chloride flush, sodium chloride, ondansetron **OR** ondansetron, polyethylene glycol, acetaminophen **OR** acetaminophen    Objective:   Vitals:   T-max:  Patient Vitals for the past 8 hrs:   BP Temp Temp src Pulse Resp SpO2 Weight   08/14/21 1215 (!) 151/73 97.6 °F (36.4 °C) Oral 63 18 98 % --   08/14/21 0900 135/84 97.7 °F (36.5 °C) Oral 67 16 97 % --   08/14/21 0600 -- -- -- -- -- -- 156 lb 12.2 oz (71.1 kg)       Intake/Output Summary (Last 24 hours) at 8/14/2021 1229  Last data filed at 8/14/2021 0830  Gross per 24 hour   Intake 2143.61 ml   Output 150 ml   Net 1993.61 ml       Physical Exam  Constitutional:       General: He is not in acute distress. Appearance: Normal appearance. He is not toxic-appearing. HENT:      Head: Normocephalic. Right Ear: External ear normal.      Left Ear: External ear normal.      Nose: Nose normal.      Mouth/Throat:      Mouth: Mucous membranes are moist.      Pharynx: Oropharynx is clear. Eyes:      Extraocular Movements: Extraocular movements intact.       Conjunctiva/sclera: Conjunctivae normal.   Cardiovascular:      Rate and Rhythm: Normal rate and THGBART, F9IXHGDE, IXV5EOE in the last 72 hours. INR:   Recent Labs     08/12/21  1123   INR 1.03     Lactate: No results for input(s): LACTATE in the last 72 hours. Cultures:  -----------------------------------------------------------------  RAD:   CT HEAD WO CONTRAST   Final Result      No acute intracranial abnormality. XR CHEST (2 VW)   Final Result      Linear density in the left lower lung-atelectasis/scarring is favored over pneumonia. Right lung is clear. Normal cardiomediastinal silhouette. Assessment/Plan:     Generalized weakness likely due to missed medication  - had not been taking parkinson's meds while getting bowel prep  - is much better since medications started. - has outpatient therapy and assistance from his wife. - do not stop parkinson's meds  - b12, folate, tsh, all normal.   - await PTOT assessment for today and can possibly discharge after    Parkinson's Disease  - continue home medications. Prior Diverticulitis  - Cscope yesterday was negative for signs of cancer. Only showed diverticulosis and hemorrhoids, views were somewhat limited.        Code Status: full  FEN: regular  PPX: lovenox  DISPO: possible DC home today if PT scores improved    Nuria Renteria MD, PGY-3  08/14/21  12:29 PM    This patient has will be staffed and discussed with Amelia Sahu MD.

## 2021-08-14 NOTE — PLAN OF CARE
Problem: Falls - Risk of:  Goal: Will remain free from falls  Outcome: Ongoing  Patient has remained alert and oriented today. Able to follow commands. And verbalize needs. Has generalized weakness and parkinsonian tremors. Up to chair today with 2 person moderate assist, walker and gait belt. In bed at present with alarm activated. Will continue to monitor for safety. Problem: Falls - Risk of:  Goal: Absence of physical injury  Outcome: Ongoing  Patient has remained free of injury. Will continue to monitor.

## 2021-08-15 VITALS
BODY MASS INDEX: 22.95 KG/M2 | HEIGHT: 68 IN | OXYGEN SATURATION: 97 % | DIASTOLIC BLOOD PRESSURE: 74 MMHG | TEMPERATURE: 97.7 F | SYSTOLIC BLOOD PRESSURE: 119 MMHG | HEART RATE: 68 BPM | WEIGHT: 151.46 LBS | RESPIRATION RATE: 18 BRPM

## 2021-08-15 LAB
ANION GAP SERPL CALCULATED.3IONS-SCNC: 11 MMOL/L (ref 3–16)
BASOPHILS ABSOLUTE: 0 K/UL (ref 0–0.2)
BASOPHILS RELATIVE PERCENT: 0.4 %
BUN BLDV-MCNC: 15 MG/DL (ref 7–20)
CALCIUM SERPL-MCNC: 9.5 MG/DL (ref 8.3–10.6)
CHLORIDE BLD-SCNC: 105 MMOL/L (ref 99–110)
CO2: 26 MMOL/L (ref 21–32)
CREAT SERPL-MCNC: 0.9 MG/DL (ref 0.8–1.3)
EOSINOPHILS ABSOLUTE: 0.1 K/UL (ref 0–0.6)
EOSINOPHILS RELATIVE PERCENT: 1.9 %
GFR AFRICAN AMERICAN: >60
GFR NON-AFRICAN AMERICAN: >60
GLUCOSE BLD-MCNC: 112 MG/DL (ref 70–99)
HCT VFR BLD CALC: 40.7 % (ref 40.5–52.5)
HEMOGLOBIN: 14 G/DL (ref 13.5–17.5)
LYMPHOCYTES ABSOLUTE: 1.5 K/UL (ref 1–5.1)
LYMPHOCYTES RELATIVE PERCENT: 18.9 %
MCH RBC QN AUTO: 32 PG (ref 26–34)
MCHC RBC AUTO-ENTMCNC: 34.4 G/DL (ref 31–36)
MCV RBC AUTO: 93.2 FL (ref 80–100)
MONOCYTES ABSOLUTE: 0.9 K/UL (ref 0–1.3)
MONOCYTES RELATIVE PERCENT: 11.5 %
NEUTROPHILS ABSOLUTE: 5.2 K/UL (ref 1.7–7.7)
NEUTROPHILS RELATIVE PERCENT: 67.3 %
PDW BLD-RTO: 14.3 % (ref 12.4–15.4)
PLATELET # BLD: 193 K/UL (ref 135–450)
PMV BLD AUTO: 8.5 FL (ref 5–10.5)
POTASSIUM SERPL-SCNC: 3.6 MMOL/L (ref 3.5–5.1)
RBC # BLD: 4.37 M/UL (ref 4.2–5.9)
SODIUM BLD-SCNC: 142 MMOL/L (ref 136–145)
WBC # BLD: 7.7 K/UL (ref 4–11)

## 2021-08-15 PROCEDURE — 6370000000 HC RX 637 (ALT 250 FOR IP): Performed by: STUDENT IN AN ORGANIZED HEALTH CARE EDUCATION/TRAINING PROGRAM

## 2021-08-15 PROCEDURE — 2580000003 HC RX 258: Performed by: STUDENT IN AN ORGANIZED HEALTH CARE EDUCATION/TRAINING PROGRAM

## 2021-08-15 PROCEDURE — 6370000000 HC RX 637 (ALT 250 FOR IP): Performed by: INTERNAL MEDICINE

## 2021-08-15 PROCEDURE — 6360000002 HC RX W HCPCS: Performed by: STUDENT IN AN ORGANIZED HEALTH CARE EDUCATION/TRAINING PROGRAM

## 2021-08-15 PROCEDURE — 97535 SELF CARE MNGMENT TRAINING: CPT

## 2021-08-15 PROCEDURE — 80048 BASIC METABOLIC PNL TOTAL CA: CPT

## 2021-08-15 PROCEDURE — 97116 GAIT TRAINING THERAPY: CPT

## 2021-08-15 PROCEDURE — 97530 THERAPEUTIC ACTIVITIES: CPT

## 2021-08-15 PROCEDURE — 85025 COMPLETE CBC W/AUTO DIFF WBC: CPT

## 2021-08-15 PROCEDURE — 36415 COLL VENOUS BLD VENIPUNCTURE: CPT

## 2021-08-15 RX ADMIN — Medication 10 ML: at 08:02

## 2021-08-15 RX ADMIN — ENOXAPARIN SODIUM 40 MG: 40 INJECTION SUBCUTANEOUS at 08:01

## 2021-08-15 RX ADMIN — CARBIDOPA AND LEVODOPA 2 TABLET: 25; 100 TABLET ORAL at 12:05

## 2021-08-15 RX ADMIN — CARBIDOPA AND LEVODOPA 2 TABLET: 25; 100 TABLET ORAL at 08:01

## 2021-08-15 RX ADMIN — PANTOPRAZOLE SODIUM 40 MG: 40 TABLET, DELAYED RELEASE ORAL at 08:01

## 2021-08-15 RX ADMIN — Medication 2000 UNITS: at 08:01

## 2021-08-15 ASSESSMENT — PAIN SCALES - GENERAL
PAINLEVEL_OUTOF10: 0
PAINLEVEL_OUTOF10: 0

## 2021-08-15 NOTE — CARE COORDINATION
Case Management Assessment            Discharge Note                    Date / Time of Note: 8/15/2021 12:31 PM                  Discharge Note Completed by: KARIME Henderson, ASHKANW    Patient Name: Jenny Spicer   YOB: 1945  Diagnosis: Weakness [R53.1]  Generalized weakness [R53.1]  Unable to ambulate [R26.2]   Date / Time: 8/12/2021 10:50 AM    Current PCP: Rivera Peña MD  Clinic patient: No    Hospitalization in the last 30 days: No    Advance Directives:  Code Status: Full Code  [place] DNR form completed and on chart: Yes    Financial:  Payor: Emma Mo / Plan: [facility] HMO / Product Type: *No Product type* /      Pharmacy:    [address] [place] [address] Drive [place] [address] [facility]  Phone: [phone] Fax: [phone] [place] [address] [phone] - f [phone]  [facility] [address]  Phone: [phone] Fax: [phone]    [address] [facility] [address] [place], [place] - [address] [phone] - f [phone]  [address] [address]  Phone: [phone] Fax: [phone]    [address] [facility] [phone] - f [phone]  [address] [place] [address]  Phone: [phone] Fax: [phone]      Assistance purchasing medications?: Potential Assistance Purchasing Medications: No  Assistance provided by Case Management: None at this time    Does patient want to participate in local refill/ meds to beds program?: Not Assessed    Meds To Beds General Rules:  1. Can ONLY be done Monday- Friday between 8:30am-5pm  2. Prescription(s) must be in pharmacy by 3pm to be filled same day  3. Copy of patient's insurance/ prescription drug card and patient face sheet must be sent along with the prescription(s)  4.  Cost of Rx cannot be added to hospital bill. If financial assistance is needed, please contact unit  or ;  or  CANNOT provide pharmacy voucher for patients co-pays  5. Patients can then  the prescription on their way out of the hospital at discharge, or pharmacy can deliver to the bedside if staff is available. (payment due at time of pick-up or delivery - cash, check, or card accepted)     Able to afford home medications/ co-pay costs: Yes    ADLS:  Current PT AM-PAC Score: 18 /24  Current OT AM-PAC Score: 21 /24      DISCHARGE Disposition: Home- No Services Needed    LOC at discharge: Not Applicable  YOUNG Completed: Yes    Notification completed in HENS/PAS?:  Not Applicable    IMM Completed:   Not Indicated    Transportation:  Transportation PLAN for discharge: family   Mode of Transport: [address]:  [address] ordered at discharge: No  [address]: Not Applicable  Orders faxed: No    Durable Medical Equipment:  DME Provider: None  Equipment obtained during hospitalization:     Home Oxygen and Respiratory Equipment:  Oxygen needed at discharge?: No  [address]: Not Applicable  Portable tank available for discharge?: Not Indicated    Dialysis:  Dialysis patient: No    Dialysis Center:  Not Applicable    Additional CM Notes: Pt from home w/wife, Pt to return home at DC, Pt denies any DC needs, Pt's wife will transport home. The Plan for Transition of Care is related to the following treatment goals of Weakness [R53.1]  Generalized weakness [R53.1]  Unable to ambulate [R26.2]    The Patient and/or patient representative Khanh Shetty and his family were provided with a choice of provider and agrees with the discharge plan Yes    Freedom of choice list was provided with basic dialogue that supports the patient's individualized plan of care/goals and shares the quality data associated with the providers.  Not Indicated    Care Transitions patient: No    [facility]

## 2021-08-15 NOTE — PROGRESS NOTES
Occupational Therapy  Facility/Department: [place] [address] PCU  Daily Treatment Note  NAME: Michell Thurman  : 1945  MRN: [id number]    Date of Service: 8/15/2021    Discharge Recommendations:  Michell Thurman scored a 21/24 on the AM-PAC ADL Inpatient form. Current research shows that an AM-PAC score of 18 or greater is typically associated with a discharge to the patient's home setting. Based on the patient's AM-PAC score, and their current ADL deficits, it is recommended that the patient have 2-3 sessions per week of Occupational Therapy at d/c to increase the patient's independence. At this time, this patient demonstrates the endurance and safety to discharge home with home OT and a follow up treatment frequency of 2-3x/wk. Please see assessment section for further patient specific details. If patient discharges prior to next session this note will serve as a discharge summary. Please see below for the latest assessment towards goals. OT Equipment Recommendations  Equipment Needed: No    Assessment   Performance deficits / Impairments: Decreased functional mobility ; Decreased endurance;Decreased ADL status; Decreased balance;Decreased strength;Decreased high-level IADLs;Decreased cognition  Assessment: Pt demonstrated significant improvement on this date-CGA for functional transfers and mobility with rolling walker, SBA for LE ADLs. Wife provides any assist he needs at  home. Recommend home with 24 hr A and home OT to increase all funtional abilities. Treatment Diagnosis: decreased ADLs and transfers secondary to weakness  Prognosis: Good  OT Education: Transfer Training;OT Role  Patient Education: verbalized understanding  Activity Tolerance  Activity Tolerance: Patient Tolerated treatment well  Safety Devices  Safety Devices in place: Yes  Type of devices: Left in chair;Nurse notified;Call light within reach; Chair alarm in place         Patient Diagnosis(es): The primary encounter diagnosis was Generalized weakness. A diagnosis of Unable to ambulate was also pertinent to this visit. has a past medical history of Anxiety, Arthritis, Arthritis of hand, right, Sneed esophagus, Sneed esophagus, Bilateral carotid artery stenosis <50%, BPH (benign prostatic hypertrophy), CAD (coronary artery disease), Carotid stenosis, Chest pain, Chronic back pain, Chronic back pain, Constipation, Degenerative arthritis of thoracic spine, Degenerative cervical disc, Depression with anxiety, Diverticulosis, Elevated PSA, Erectile dysfunction, Essential hypertension, benign, GERD (gastroesophageal reflux disease), Hemorrhoid, Hiatal hernia, Hyperlipidemia, Impaired fasting glucose, Osteopenia per CXR, Parkinson disease ([facility] [address] Renal stone, RLS (restless legs syndrome), S/P colonoscopy, S/P endoscopy, Sigmoidoscopy exam, Therapeutic drug monitoring, Thoracic spondylosis, Venous insufficiency of leg, and Vocal cord paresis. has a past surgical history that includes Cataract removal; Colonoscopy (7025;7596); Upper gastrointestinal endoscopy (5/07); Knee cartilage surgery; Lithotripsy; osteotomy (Left); Knee Arthroplasty (Left, 7/30/13); TURP (N/A, [id number]); and joint replacement (Right, 02/23/2017). Restrictions  Position Activity Restriction  Other position/activity restrictions: Up with assist  Subjective   General  Chart Reviewed: Yes  Additional Pertinent Hx: Pt admitted to ED with c/o weakness and difficulty standing-- pt was prepping for colonoscopy and was NPO. Pt also had been taking less of his parkinson's medications in preparation for the procedure. Admitted to the hospital for workup.  PMHx includes: Anxiety, Arthritis, Arthritis of hand, right (6/20/2012), Sneed esophagus, Sneed esophagus, Bilateral carotid artery stenosis <50% (4/30/2014), BPH (benign prostatic hypertrophy), CAD (coronary artery disease), Carotid stenosis, Chest pain, Chronic back pain (1/26/2012), Chronic back pain, Constipation (8/6/2013), Degenerative arthritis of thoracic spine (1/26/2012), Degenerative cervical disc (1/26/2012), Depression with anxiety (11/15/2011), Diverticulosis (1/17/2012), Elevated PSA, Erectile dysfunction (6/13/2017), Essential hypertension, benign (6/20/2012), GERD (gastroesophageal reflux disease), Hemorrhoid, Hiatal hernia, Hyperlipidemia, Impaired fasting glucose (5/19/2014), Osteopenia per CXR (5/19/2014), Parkinson disease ([facility] [address] Renal stone (4/2012), RLS (restless legs syndrome), S/P colonoscopy (2011;5/20/19), S/P endoscopy (1/2012), Sigmoidoscopy exam (1/17/2012), Therapeutic drug monitoring (5/14/15), Thoracic spondylosis, Venous insufficiency of leg, and Vocal cord paresis (5/11/2016). Family / Caregiver Present: Yes (wife)  Referring Practitioner: Erik Vyas MD  Diagnosis: weakness  Subjective  Subjective: Pt in chair, agreeable to work with OT.   Vital Signs  Patient Currently in Pain: Denies   Orientation     Objective    ADL  LE Dressing: Stand by assistance (donning B socks and hospital pants)        Balance  Sitting Balance: Stand by assistance  Standing Balance: Contact guard assistance  Standing Balance  Time: ~15 minutes  Activity: functional mobility, transfers, ADLs    Functional Mobility  Functional Mobility Comments: functional mobiloity with rolling walker, CGA, steady    Toilet Transfers  Toilet - Technique: Ambulating (with rolling walker and CGA)  Equipment Used: Extra wide bedside commode  Toilet Transfer: Contact guard assistance     Transfers  Sit to stand: Contact guard assistance (cues for hand placement)  Stand to sit: Contact guard assistance (cues for hand placement)                       Cognition  Overall Cognitive Status: [facility]                     Plan   Plan  Times per week: 2-5  Times per day: Daily  Current Treatment Recommendations: Strengthening, Balance Training, Functional Mobility Training, Endurance Training, Self-Care / ADL, Neuromuscular Re-education, Patient/Caregiver

## 2021-08-15 NOTE — PROGRESS NOTES
hand, right, Sneed esophagus, Sneed esophagus, Bilateral carotid artery stenosis <50%, BPH (benign prostatic hypertrophy), CAD (coronary artery disease), Carotid stenosis, Chest pain, Chronic back pain, Chronic back pain, Constipation, Degenerative arthritis of thoracic spine, Degenerative cervical disc, Depression with anxiety, Diverticulosis, Elevated PSA, Erectile dysfunction, Essential hypertension, benign, GERD (gastroesophageal reflux disease), Hemorrhoid, Hiatal hernia, Hyperlipidemia, Impaired fasting glucose, Osteopenia per CXR, Parkinson disease ([facility] [address] Renal stone, RLS (restless legs syndrome), S/P colonoscopy, S/P endoscopy, Sigmoidoscopy exam, Therapeutic drug monitoring, Thoracic spondylosis, Venous insufficiency of leg, and Vocal cord paresis. has a past surgical history that includes Cataract removal; Colonoscopy (8382;1619); Upper gastrointestinal endoscopy (5/07); Knee cartilage surgery; Lithotripsy; osteotomy (Left); Knee Arthroplasty (Left, 7/30/13); TURP (N/A, [id number]); and joint replacement (Right, 02/23/2017). Restrictions  Position Activity Restriction  Other position/activity restrictions: Up with assist  Subjective   General  Chart Reviewed: Yes  Additional Pertinent Hx: Pt to ED 8/12 with fatigue and unable to ambulate post prep for colonoscopy. Pt admitted for weakness. PMH:  OA, CAD, DDD, HTN, back pain, depression with anxiety, osteopenia, Parkinson's disease, RLS, vocal cord paresis. Family / Caregiver Present: Yes (wife)  Subjective  Subjective: \"I am good. \"  General Comment  Comments:  The pt presents sitting up in chair and willing to work with therapist.  Pain Screening  Patient Currently in Pain: Denies  Vital Signs  Patient Currently in Pain: Denies       Orientation     Cognition   Cognition  Overall Cognitive Status: WFL  Objective      Transfers  Sit to Stand: Contact guard assistance (from recliner, from bed, from commode)  Stand to sit: Contact guard assistance  Bed to Chair: Contact guard assistance  Stand Pivot Transfers: Contact guard assistance  Comment: with RW  Ambulation  Ambulation?: Yes  Ambulation 1  Device: Rolling Walker  Assistance: Contact guard assistance  Quality of Gait: pt able to initiate gait today CGA with forward flexed posture  Gait Deviations: Decreased step length;Decreased step height  Distance: 10'+100'  Comments: pt steady with use of walker which he stated to prefer at this time.   Stairs/Curb  Stairs?: No     Balance  Sitting - Static: Good  Sitting - Dynamic: Fair;+  Standing - Static: Fair  Standing - Dynamic: Fair;-    AM-PAC Score  AM-PAC Inpatient Mobility Raw Score : 18 (08/15/21 1059)  AM-PAC Inpatient T-Scale Score : 43.63 (08/15/21 1059)  Mobility Inpatient CMS 0-100% Score: 46.58 (08/15/21 1059)  Mobility Inpatient CMS G-Code Modifier : CK (08/15/21 1059)          Goals  Short term goals  Time Frame for Short term goals: Discharge  Short term goal 1: supine <> sit CGA ongoing  Short term goal 2: sit <> stand CGA- Met revise to Supervision  Short term goal 3: bed <> chair CGA-MET revise to supervision  Short term goal 4: ambulate 50ft with rolling walker CGA-MET revise to 150' with supervision  Patient Goals   Patient goals : Not stated    Plan    Plan  Times per week: 2-5  Current Treatment Recommendations: Transfer Training, Gait Training, Functional Mobility Training, Strengthening  Safety Devices  Type of devices: Left in chair, Chair alarm in place, Call light within reach, Nurse notified     Therapy Time   Individual Concurrent Group Co-treatment   Time In 0955         Time Out 1034         Minutes 39         Timed Code Treatment Minutes: 39 Minutes    Timed Code Treatment Minutes:  39 Minutes    Total Treatment Minutes:  39 minutes      Isiah Painting PT

## 2021-08-15 NOTE — DISCHARGE SUMMARY
INTERNAL MEDICINE DEPARTMENT AT [address] [place] [address]  DISCHARGE SUMMARY    Patient ID: Monica Dale                                             Discharge Date: 8/15/2021   Patient's PCP: Shay Kent MD                                          Discharge Physician: Kailee Thapa MD  Admit Date: 8/12/2021   Admitting Physician: Doris Bloom MD    DISCHARGE DIAGNOSES:   1. Physical deconditioning  2. Parkinson's disease  3. Orthostatic hypotension  4. Diverticulosis      68year old male with past medical history that includes Parkinson's. Carotid stenosis, levine's esophagus, hypertension, GERD, hyperlipidemia, arthritis, and diverticulitis; who presented to the ED with a chief complaint of weakness and fatigue. During patient's stay in house he was noted to be lethargic with hypophonia in the AM of 8/13/21. He also had significant orthostatic hypotension with systolic drops from the 454A to 110s, to help with this MAIK hose stockings were ordered to be put on bilateral lower extremities. On 8/13 the patient received his colonoscopy due to luminal thickening and concern for malignancy, the procedure note noted that the patient had extensive diverticulosis and large internal hemorrhoids. His sinemet doses were recently changed, and after pharmacy medication reconcilliations medications were corrected in chart. Metabolic encephalopathy was rued out. He was AOx3 at the time of discharge. On hospital day #1 seen by PT/OT and did not do well, and [facility] 7/24  Later seen and [facility] 18/24, will be discharged home with [facility] with PT/OT/Medications/Vitals check    Physical Exam:  /74   Pulse 68   Temp 97.7 °F (36.5 °C) (Oral)   Resp 18   Ht 5' 8\" (1.727 m)   Wt 151 lb 7.3 oz (68.7 kg)   SpO2 97%   BMI 23.03 kg/m²   Constitutional:       General: He is not in acute distress. Appearance: Normal appearance. He is not toxic-appearing. Cardiovascular:      Rate and Rhythm: Normal rate and regular rhythm. Pulses: Normal pulses. Heart sounds: No murmur heard. Pulmonary:      Effort: Pulmonary effort is normal. No respiratory distress. Breath sounds: Normal breath sounds. Abdominal:      General: There is no distension. Palpations: Abdomen is soft. Tenderness: There is no abdominal tenderness. There is no guarding or rebound. Hernia: No hernia is present. Musculoskeletal:         General: No swelling. Cervical back: Normal range of motion. No rigidity. Skin:     General: Skin is warm. Coloration: Skin is not pale. Findings: No bruising. Neurological:      General: No focal deficit present. Mental Status: He is oriented to person, place, and time. Cranial Nerves: No cranial nerve deficit. Motor: No weakness. Comments: Tremor, bradykinesia, stuttering speech - overall improved. Psychiatric:         Mood and Affect: Mood normal.         Behavior: Behavior normal.     Consults: GI and home care needs  Significant Diagnostic Studies:  chest x-ray and CT head without contrast and colonoscopy  Treatments: IV hydration, anticoagulation: ASA and procedures: colonoscopy, Sinemet, donepezil, Lovenox, Protonix, Lipitor  Disposition: home  Discharged Condition: Stable  Follow Up: Follow up with PCP, Neurology  Needs discussion with neurology about possibility of starting northera for orthostatic hypotension    DISCHARGE MEDICATION:     Medication List      CONTINUE taking these medications    aspirin 81 MG EC tablet     * Rytary 36. MG Cpcr  Generic drug: Carbidopa-Levodopa ER     * carbidopa-levodopa  MG per tablet  Commonly known as: SINEMET     clonazePAM 0.5 MG tablet  Commonly known as: KLONOPIN     donepezil 5 MG tablet  Commonly known as: ARICEPT     imipramine 25 MG tablet  Commonly known as: TOFRANIL  TAKE 1 TABLET NIGHTLY     lidocaine 5 %  Commonly known as: Lidoderm  Place 1 patch onto the skin daily 12 hours on, 12 hours off. linaclotide 290 MCG Caps capsule  Commonly known as: LINZESS     MIRALAX PO     modafinil 100 MG tablet  Commonly known as: PROVIGIL     pantoprazole 40 MG tablet  Commonly known as: PROTONIX     simvastatin 10 MG tablet  Commonly known as: ZOCOR  Take 1 tablet by mouth nightly     Vitamin D3 50 MCG (2000 UT) Caps         * This list has 2 medication(s) that are the same as other medications prescribed for you. Read the directions carefully, and ask your doctor or other care provider to review them with you. Activity: activity as tolerated  Diet: regular diet  Wound Care: as directed    Time Spent on discharge is more than 45 minutes    Signed:  Yulisa Curry DPM    I have personally seen and evaluated this patient. I discussed findings and management with the resident, on 08/15/21  and agree as documented in this note   And changes made to the note. I discussed in detail about patient's plan of care with patient's wife who is primary care giver, all questions were answered    My time spent reviewing discharge plan and follow ups with resident, along with performing independent review of discharge medicines along with counseling the patient exceeds 30 minutes.     Ja Lindquist MD  Hospitalist  Attending Physician

## 2021-08-15 NOTE — DISCHARGE INSTR - COC
Continuity of Care Form    Patient Name: Michell Thurman   :  1945  MRN:  [id number]    Admit date:  2021  Discharge date:  ***    Code Status Order: Full Code   Advance Directives:   Advance Care Flowsheet Documentation       Date/Time Healthcare Directive Type of Healthcare Directive Copy in [address] Agent's Name Healthcare Agent's Phone Number    21 1517  Yes, patient has an advance directive for healthcare treatment  Durable power of  for health care  Yes, copy in chart  [address] [facility]  [phone]            Admitting Physician:  Brooks Chapa MD  PCP: Patti Siegel MD    Discharging Nurse: [facility] Unit/Room#: 6403/5291-98  Discharging Unit Phone Number: ***    Emergency Contact:   Extended Emergency Contact Information  Primary Emergency Contact: Christen Hernandez  Address: [place] [address], [address] Phone: [phone]  Mobile Phone: [phone]  Relation: Spouse    Past Surgical History:  Past Surgical History:   Procedure Laterality Date    CATARACT REMOVAL      COLONOSCOPY  ;    JOINT REPLACEMENT Right 2017    Right TKR(knee)    KNEE ARTHROPLASTY Left 13    LEFT TOTAL KNEE ARTHROPLASTY              KNEE CARTILAGE SURGERY      Left x 2, right x1    LITHOTRIPSY      Kidney stone removal as per urology:Stent removed after 10days    OSTEOTOMY Left     TURP N/A [id number]    TRANSURETHRAL RESECTION OF PROSTATE    UPPER GASTROINTESTINAL ENDOSCOPY         Immunization History:   Immunization History   Administered Date(s) Administered    Influenza 2011    Influenza, High Dose (Fluzone 65 yrs and older) 10/12/2010, 10/25/2013, 10/07/2014, 10/19/2015, 2016, 10/19/2017, 10/01/2018    Pneumococcal Conjugate 13-valent (Qniwwjx29) 2016    Pneumococcal Polysaccharide (Fwcguflvw22) 2011    Td, unspecified formulation 2016       Active Problems:  Patient Active Problem List   Diagnosis Code    Easy bruising R23.8    Rib pain R07.81    Right foot pain M79.671    Fatigue R53.83    Adenopathy R59.9    Dermatitis L30.9    Abdominal wall pain R10.9    Depression with anxiety F41.8    Costochondritis:left M94.0    Rib pain:left R07.81    Other chest pain R07.89    Degenerative cervical disc M50.30    Degenerative arthritis of thoracic spine M47.814    Chronic back pain M54.9, G89.29    Elevated blood-pressure reading without diagnosis of hypertension R03.0    Arthritis of hand, right M19.041    Cyst in hand:Right [id number]    Mass of right hand R22.30    Dupuytren's disease of palm M72.0    Osteoarthritis of left knee M17.12    BPH (benign prostatic hyperplasia) N40.0    HTN (hypertension) I10    Constipation K59.00    Elevated WBC count D72.829    Prostate problem N42.9    ? 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    Anxiety F41.9    S/P right knee replacement Z96.659    Erectile dysfunction N52.9    Hyperlipidemia, mixed E78.2    Thigh injury S79.929A    Pain of left thigh M79.652    Skin tear of left hand without complication V05.269S    Skin tear of right hand without complication N84.531G    Frequent falls R29.6    Generalized weakness R53.1    Weakness R53.1       Isolation/Infection:   Isolation            No Isolation          Patient Infection Status       None to display            Nurse Assessment:  Last Vital Signs: BP (!) 189/91   Pulse 66   Temp 97.7 °F (36.5 °C) Therapies: {THERAPEUTIC INTERVENTION:7134730920}  Weight Bearing Status/Restrictions: { CC Weight Bearin:::0}  Other Medical Equipment (for information only, NOT a DME order):  {EQUIPMENT:825055532}  Other Treatments: ***    Patient's personal belongings (please select all that are sent with patient):  {CHP DME Belongings:987420379:::0}    RN SIGNATURE:  {Esignature:510595508:::0}    CASE MANAGEMENT/SOCIAL WORK SECTION    Inpatient Status Date: ***    Readmission Risk Assessment Score:  Readmission Risk              Risk of Unplanned Readmission:  16           Discharging to Facility/ Agency   Name:   Address:  Phone:  Fax:    Dialysis Facility (if applicable)   Name:  Address:  Dialysis Schedule:  Phone:  Fax:    / signature: {Esignature:903414485:::0}    PHYSICIAN SECTION    Prognosis: Fair    Condition at Discharge: Stable    Rehab Potential (if transferring to Rehab): Good    Recommended Labs or Other Treatments After Discharge:   - Consult Physical Therapy for  Evaluate and Treat  - Consult Occupational Therapy for  Evaluate and Treat  - vitals check  - Medication check for safety and complaince    Physician Certification: I certify the above information and transfer of Lauren Damon  is necessary for the continuing treatment of the diagnosis listed and that he requires Home Care for less 30 days.      Update Admission H&P: No change in H&P    PHYSICIAN SIGNATURE:  Electronically signed by Darcy Dave MD on 8/15/21 at 11:31 AM EDT

## 2021-08-15 NOTE — PROGRESS NOTES
Progress Note    Admit Date: 8/12/2021  Diet: ADULT DIET; Regular    CC: weakness    Interval history:   -feeling much better today  -is able to communicate with stuttering speech  -no other complaints this morning      Medications:     Scheduled Meds:   carbidopa-levodopa  2 tablet Oral 4x Daily    Carbidopa-Levodopa ER  1 tablet Oral 4x Daily    aspirin  81 mg Oral Once per day on Mon Wed Fri    Vitamin D  2,000 Units Oral Daily    donepezil  5 mg Oral Nightly    imipramine  25 mg Oral Nightly    pantoprazole  40 mg Oral Daily    atorvastatin  10 mg Oral Nightly    sodium chloride flush  5-40 mL Intravenous 2 times per day    enoxaparin  40 mg Subcutaneous Daily     Continuous Infusions:   sodium chloride       PRN Meds:sodium chloride flush, sodium chloride, ondansetron **OR** ondansetron, polyethylene glycol, acetaminophen **OR** acetaminophen    Objective:   Vitals:   T-max:  Patient Vitals for the past 8 hrs:   BP Temp Temp src Pulse Resp SpO2 Weight   08/15/21 0800 (!) 189/91 97.7 °F (36.5 °C) Oral 66 20 97 % --   08/15/21 0358 (!) 170/89 98.6 °F (37 °C) Oral 68 18 97 % 151 lb 7.3 oz (68.7 kg)       Intake/Output Summary (Last 24 hours) at 8/15/2021 6700  Last data filed at 8/15/2021 0358  Gross per 24 hour   Intake 360 ml   Output 525 ml   Net -165 ml       Physical Exam  Constitutional:       General: He is not in acute distress. Appearance: Normal appearance. He is not toxic-appearing. Cardiovascular:      Rate and Rhythm: Normal rate and regular rhythm. Pulses: Normal pulses. Heart sounds: No murmur heard. Pulmonary:      Effort: Pulmonary effort is normal. No respiratory distress. Breath sounds: Normal breath sounds. Abdominal:      General: There is no distension. Palpations: Abdomen is soft. Tenderness: There is no abdominal tenderness. There is no guarding or rebound. Hernia: No hernia is present. Musculoskeletal:         General: No swelling. Cervical back: Normal range of motion. No rigidity. Skin:     General: Skin is warm. Coloration: Skin is not pale. Findings: No bruising. Neurological:      General: No focal deficit present. Mental Status: He is oriented to person, place, and time. Cranial Nerves: No cranial nerve deficit. Motor: No weakness. Comments: Tremor, bradykinesia, stuttering speech - overall improved. Psychiatric:         Mood and Affect: Mood normal.         Behavior: Behavior normal.           LABS:    CBC:   Recent Labs     08/13/21  0527 08/14/21  0452 08/15/21  0614   WBC 6.4 6.8 7.7   HGB 13.4* 14.0 14.0   HCT 39.7* 41.0 40.7    162 193   MCV 94.8 95.3 93.2     Renal:    Recent Labs     08/13/21 0527 08/13/21 0527 08/14/21 0452 08/14/21  0805 08/15/21  0613     --  133*  --  142   K 3.9   < > 5.7* 3.9 3.6     --  102  --  105   CO2 28  --  22  --  26   BUN 8  --  11  --  15   CREATININE 0.8  --  0.6*  --  0.9   GLUCOSE 96  --  90  --  112*   CALCIUM 9.1  --  9.2  --  9.5   MG 2.10  --   --   --   --    PHOS 3.1  --   --   --   --    ANIONGAP 10  --  9  --  11    < > = values in this interval not displayed. Hepatic:   No results for input(s): AST, ALT, BILITOT, BILIDIR, PROT, LABALBU, ALKPHOS in the last 72 hours. Troponin:   Recent Labs     08/12/21 1123   TROPONINI <0.01     BNP: No results for input(s): BNP in the last 72 hours. Lipids:   No results for input(s): CHOL, HDL in the last 72 hours. Invalid input(s): LDLCALCU, TRIGLYCERIDE  ABGs:  No results for input(s): PHART, JLC8BBK, PO2ART, QXD7ENP, BEART, THGBART, T0YJTNKJ, NDH3NZH in the last 72 hours. INR:   Recent Labs     08/12/21  1123   INR 1.03     Lactate: No results for input(s): LACTATE in the last 72 hours. Cultures:  -----------------------------------------------------------------  RAD:   CT HEAD WO CONTRAST   Final Result      No acute intracranial abnormality.             XR CHEST (2 VW) Final Result      Linear density in the left lower lung-atelectasis/scarring is favored over pneumonia. Right lung is clear. Normal cardiomediastinal silhouette. Assessment/Plan:     Generalized weakness likely due to missed medication  - had not been taking parkinson's meds while getting bowel prep  - is much better since medications started. - has outpatient therapy and assistance from his wife. - do not stop parkinson's meds  - b12, folate, tsh, all normal.   - PTOT assessment for today and can possibly discharge after    Parkinson's Disease  - continue home medications. Prior Diverticulitis  - Cscope on 8/13/21 was negative for signs of cancer. Only showed diverticulosis and hemorrhoids, views were somewhat limited.        Code Status: full  FEN: regular  PPX: lovenox  DISPO: possible DC home today if PT scores improved    Stanley Berry DPM, PGY-1  08/15/21  9:38 AM    This patient has will be staffed and discussed with Minoo Hinojosa MD.

## 2021-08-16 ENCOUNTER — TELEPHONE (OUTPATIENT)
Dept: FAMILY MEDICINE CLINIC | Age: 76
End: 2021-08-16

## 2021-08-16 NOTE — TELEPHONE ENCOUNTER
[facility] Transitions Initial Follow Up Call    Outreach made within 2 business days of discharge: Yes    Patient: Mahamed Scherer Patient : 1945   MRN: <[id number]>  Reason for Admission: There are no discharge diagnoses documented for the most recent discharge. Discharge Date: 8/15/21       Spoke with: patient    Discharge department/facility: [facility] Patient Contact:  Was patient able to fill all prescriptions:   Was patient instructed to bring all medications to the follow-up visit:   Is patient taking all medications as directed in the discharge summary?    Does patient understand their discharge instructions:   Does patient have questions or concerns that need addressed prior to 7-14 day follow up office visit:     Scheduled appointment with PCP within 7-14 days    Follow Up  Future Appointments   Date Time Provider Dell Molina   11/3/2021  1:45 PM Neda Ahumada, MD [facility]   2021 12:30 PM Ellie Espinosa MD [facility]       Alison Nicci, [place]

## 2021-08-20 ENCOUNTER — HOSPITAL ENCOUNTER (EMERGENCY)
Age: 76
Discharge: HOME | End: 2021-08-20
Attending: EMERGENCY MEDICINE
Payer: MEDICARE

## 2021-08-20 ENCOUNTER — APPOINTMENT (OUTPATIENT)
Dept: CT IMAGING | Age: 76
End: 2021-08-20
Payer: MEDICARE

## 2021-08-20 VITALS
OXYGEN SATURATION: 100 % | RESPIRATION RATE: 17 BRPM | SYSTOLIC BLOOD PRESSURE: 109 MMHG | TEMPERATURE: 98.3 F | HEART RATE: 61 BPM | DIASTOLIC BLOOD PRESSURE: 56 MMHG

## 2021-08-20 DIAGNOSIS — S01.01XA LACERATION OF SCALP, INITIAL ENCOUNTER: ICD-10-CM
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
## 2021-08-20 PROCEDURE — 2500000003 HC RX 250 WO HCPCS: Performed by: STUDENT IN AN ORGANIZED HEALTH CARE EDUCATION/TRAINING PROGRAM

## 2021-08-20 PROCEDURE — 99283 EMERGENCY DEPT VISIT LOW MDM: CPT

## 2021-08-20 PROCEDURE — 12002 RPR S/N/AX/GEN/TRNK2.6-7.5CM: CPT

## 2021-08-20 PROCEDURE — 70450 CT HEAD/BRAIN W/O DYE: CPT

## 2021-08-20 RX ORDER — LIDOCAINE HYDROCHLORIDE AND EPINEPHRINE 10; 10 MG/ML; UG/ML
20 INJECTION, SOLUTION INFILTRATION; PERINEURAL ONCE
Status: COMPLETED | OUTPATIENT
Start: 2021-08-20 | End: 2021-08-20

## 2021-08-20 RX ADMIN — LIDOCAINE HYDROCHLORIDE AND EPINEPHRINE 20 ML: 10; 10 INJECTION, SOLUTION INFILTRATION; PERINEURAL at 14:34

## 2021-08-20 ASSESSMENT — PAIN SCALES - GENERAL: PAINLEVEL_OUTOF10: 0

## 2021-08-20 NOTE — ED TRIAGE NOTES
Patient presents to ED for fall. Patient reports losing balance while putting on pants and hitting head on corner of dresser. Left side laceration to head. Denies LOC.

## 2021-08-20 NOTE — ED PROVIDER NOTES
[facility] RESIDENT NOTE       Date of evaluation: 8/20/2021    Chief Complaint     Fall (Patient fell while trying to put pants on. Patient hit left side of head on corner of dresser)      of Present Illness     Robert Faye is a 68 y.o. male who presents for fall. Patient was putting on his shorts when his toe got stuck in his shorts, he tripped and hit his head on his dresser. Patient noted blood from the top of his head and therefore his wife brought him to the ED for further evaluation. Patient denies any loss of consciousness, dizziness/lightheadedness, chest pain/palpitations or weakness/numbness and tingling prior to the fall. Patient states he has some pain over his laceration but otherwise denies headache, vomiting, changes in vision, unsteadiness on his feet, or ongoing dizziness. Review of Systems     Denies fever, chills, headache, changes in vision, weakness, numbness/tingling, chest pain/palpitations, nausea/vomiting/diarrhea, abdominal pain, lower extremity edema and rash. All other systems were reviewed and were negative.     Past Medical, Surgical, Family, and Social History     He has a past medical history of Anxiety, Arthritis, Arthritis of hand, right, Sneed esophagus, Sneed esophagus, Bilateral carotid artery stenosis <50%, BPH (benign prostatic hypertrophy), CAD (coronary artery disease), Carotid stenosis, Chest pain, Chronic back pain, Chronic back pain, Constipation, Degenerative arthritis of thoracic spine, Degenerative cervical disc, Depression with anxiety, Diverticulosis, Elevated PSA, Erectile dysfunction, Essential hypertension, benign, GERD (gastroesophageal reflux disease), Hemorrhoid, Hiatal hernia, Hyperlipidemia, Impaired fasting glucose, Osteopenia per CXR, Parkinson disease ([address] Renal stone, RLS (restless legs syndrome), S/P colonoscopy, S/P endoscopy, Sigmoidoscopy exam, Therapeutic drug monitoring, Thoracic spondylosis, Venous insufficiency of leg, and Vocal cord paresis. He has a past surgical history that includes Cataract removal; Colonoscopy (6970;6219); Upper gastrointestinal endoscopy (5/07); Knee cartilage surgery; Lithotripsy; osteotomy (Left); Knee Arthroplasty (Left, 7/30/13); TURP (N/A, [id number]); joint replacement (Right, 02/23/2017); and Colonoscopy (8/13/2021). His family history includes Alcohol Abuse in his father; Cancer (age of onset: 68) in his sister; Coronary Art Dis (age of onset: 77) in his brother; Coronary Art Dis (age of onset: 80) in his mother; Heart Disease in his sister; Kidney Disease (age of onset: 61) in his father. He reports that he has never smoked. He has never used smokeless tobacco. He reports current alcohol use. He reports that he does not use drugs. Medications     Discharge Medication List as of 8/20/2021  4:04 PM      CONTINUE these medications which have NOT CHANGED    Details   imipramine (TOFRANIL) 25 MG tablet TAKE 1 TABLET NIGHTLY, Disp-90 tablet, R-0Normal      lidocaine (LIDODERM) 5 % Place 1 patch onto the skin daily 12 hours on, 12 hours off., Disp-30 patch, R-0Print      modafinil (PROVIGIL) 100 MG tablet Take  mg by mouth every morning. Historical Med      simvastatin (ZOCOR) 10 MG tablet Take 1 tablet by mouth nightly, Disp-90 tablet, R-3Normal      donepezil (ARICEPT) 5 MG tablet Take 10 mg by mouth nightly Historical Med      linaclotide (LINZESS) 290 MCG CAPS capsule Take 290 mcg by mouth every morning (before breakfast)      clonazePAM (KLONOPIN) 0.5 MG tablet Take 1 mg by mouth nightly Historical Med      carbidopa-levodopa (SINEMET)  MG per tablet Take 2 tablets by mouth 4 times daily 0800, 1200, 1600 and 2000.    (with Rytary)Historical Med      Cholecalciferol (VITAMIN D3) 2000 UNITS CAPS   Take 1 capsule by mouth daily       aspirin 81 MG EC tablet   Take 81 mg by mouth three times a week On Mondays, Wednesdays and Fridays Polyethylene Glycol 3350 (MIRALAX PO)   Take 17 g by mouth daily as needed       pantoprazole (PROTONIX) 40 MG tablet   Take 40 mg by mouth daily       Carbidopa-Levodopa ER (RYTARY) 36. MG CPCR Take 1 tablet by mouth 4 times daily 0800, 1200, 1600, 2000Historical Med             Allergies     He is allergic to levofloxacin. Physical Exam     INITIAL VITALS: BP: 115/68, Temp: 98.3 °F (36.8 °C), Pulse: 61, Resp: 17, SpO2: 99 %     General:  Well appearing. No acute distress  Head: 4 cm laceration over the left parietal scalp. Hemostatic. No tenderness to palpation of the head. Eyes:  PERRL. EOMI. No discharge from eyes   ENT:  No discharge from nose. OP clear  Neck:  Supple, trachea midline  Pulmonary:   Non-labored breathing. Breath sounds clear bilaterally  Cardiac:  Regular rate and rhythm. No murmurs/rubs/gallops  Abdomen:  Soft. Non-tender. Non-distended  Musculoskeletal:  No long bone deformity. Vascular:  Extremities warm and perfused. Normal pulses in all 4 extremities  Skin:  Dry, no rashes  Extremities:  No peripheral edema  Neuro:  Alert and oriented x 4. CN II-XII intact. 5/5 strength in all 4 extremities. Sensation grossly intact to light touch. Speech and mentation normal.  Gait narrow and stable      DiagnosticResults       RADIOLOGY:  CT Head WO Contrast   Final Result      No acute intracranial hemorrhage or mass effect. LABS:   No results found for this visit on 08/20/21. RECENT VITALS:  BP: (!) 109/56, Temp: 98.3 °F (36.8 °C), Pulse: 61,Resp: 17, SpO2: 100 %     Procedures     Lac Repair    Date/Time: 8/20/2021 4:17 PM  Performed by: Naren Centeno MD  Authorized by: Bakari Peñaloza MD     Consent:     Consent obtained:  Verbal    Consent given by:  Patient    Alternatives discussed:  No treatment  Anesthesia (see MAR for exact dosages):      Anesthesia method:  Local infiltration    Local anesthetic:  Lidocaine 1% WITH epi  Laceration details: Location:  Scalp    Scalp location:  L parietal    Length (cm):  4    Depth (mm):  1  Repair type:     Repair type:  Simple  Exploration:     Wound exploration: entire depth of wound probed and visualized      Contaminated: no    Treatment:     Area cleansed with:  Saline    Amount of cleaning:  Standard    Irrigation solution:  Sterile saline    Irrigation method:  Tap    Visualized foreign bodies/material removed: no    Skin repair:     Repair method:  Staples    Number of staples:  10  Approximation:     Approximation:  Close  Post-procedure details:     Dressing:  Open (no dressing)    Patient tolerance of procedure: Tolerated well, no immediate complications        ED Course     Nursing Notes, Past Medical Hx, Past Surgical Hx, Social Hx, Allergies, and Family Hx were reviewed. The patient was given the followingmedications:  Orders Placed This Encounter   Medications    lidocaine-EPINEPHrine 1 %-1:[id number] injection 20 mL       CONSULTS:  None    MEDICAL DECISION MAKING / ASSESSMENT / Sydney Bond is a 68 y.o. male who presents to the ED after a fall. Patient appeared well, afebrile with normal vital signs. Patient presents ED after mechanical fall after getting his toe caught in his shorts. He reportedly hit his head on the corner of his dresser with a 4 cm laceration noted over his left parietal scalp. No loss of consciousness, or prodromal symptoms to suggest syncope. Patient with a nonfocal neurological examination. CT of the head did not show any intracranial abnormality. Patient's laceration was irrigated with saline, no foreign body was appreciated and repaired with 10 staples. She tolerated procedure well, was instructed to follow-up in 10 days for staple removal with his primary care physician or in the emergency department. This patient was also evaluated by the attending physician. All care plans werediscussed and agreed upon.       At this time, the patient was deemed appropriate for discharge. My customary discharge instructions, including strict return precautions for new or worsening symptoms concerning to the patient, were provided. All of patient's questions were answered satisfactorily, and he was subsequently sent home in stable condition. Clinical Impression     1. Fall, initial encounter    2. Laceration of scalp, initial encounter        Disposition     PATIENT REFERRED TO:  No follow-up provider specified.     DISCHARGE MEDICATIONS:  Discharge Medication List as of 8/20/2021  4:04 PM          DISPOSITION Decision To Discharge 08/20/2021 03:59:50 PM     Estela Phillips MD  08/20/21 2293

## 2021-08-20 NOTE — ED PROVIDER NOTES
ED Attending Attestation Note     Date of evaluation: 8/20/2021    This patient was seen by the resident. I have seen and examined the patient, agree with the workup, evaluation, management and diagnosis. The care plan has been discussed. My assessment reveals a 77-year-old male sent in for mechanical fall after he lost his balance and hit his head on the corner of his dresser. He is awake and alert no acute distress with a GCS of 4, 5, 6. He has a large linear laceration over the left parietal scalp. I was present for the repair of this laceration performed by the resident physician.         Ryanne Tay MD  08/20/21 6051

## 2021-08-30 ENCOUNTER — OFFICE VISIT (OUTPATIENT)
Dept: FAMILY MEDICINE CLINIC | Age: 76
End: 2021-08-30
Payer: MEDICARE

## 2021-08-30 VITALS
RESPIRATION RATE: 16 BRPM | HEART RATE: 71 BPM | BODY MASS INDEX: 23.76 KG/M2 | DIASTOLIC BLOOD PRESSURE: 74 MMHG | OXYGEN SATURATION: 96 % | WEIGHT: 156.8 LBS | TEMPERATURE: 98.3 F | SYSTOLIC BLOOD PRESSURE: 136 MMHG | HEIGHT: 68 IN
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
## 2021-08-30 DIAGNOSIS — Z09 HOSPITAL DISCHARGE FOLLOW-UP: Primary | ICD-10-CM

## 2021-08-30 PROCEDURE — 1036F TOBACCO NON-USER: CPT | Performed by: NURSE PRACTITIONER

## 2021-08-30 PROCEDURE — G8427 DOCREV CUR MEDS BY ELIG CLIN: HCPCS | Performed by: NURSE PRACTITIONER

## 2021-08-30 PROCEDURE — 1123F ACP DISCUSS/DSCN MKR DOCD: CPT | Performed by: NURSE PRACTITIONER

## 2021-08-30 PROCEDURE — G8420 CALC BMI NORM PARAMETERS: HCPCS | Performed by: NURSE PRACTITIONER

## 2021-08-30 PROCEDURE — 99213 OFFICE O/P EST LOW 20 MIN: CPT | Performed by: NURSE PRACTITIONER

## 2021-08-30 PROCEDURE — 4040F PNEUMOC VAC/ADMIN/RCVD: CPT | Performed by: NURSE PRACTITIONER

## 2021-08-30 PROCEDURE — 1111F DSCHRG MED/CURRENT MED MERGE: CPT | Performed by: NURSE PRACTITIONER

## 2021-08-30 RX ORDER — PHENOL 1.4 %
AEROSOL, SPRAY (ML) MUCOUS MEMBRANE NIGHTLY PRN
COMMUNITY

## 2021-08-30 NOTE — PROGRESS NOTES
Post-Discharge Transitional Care Management Services or Hospital Follow Up      Breonna Duron   YOB: 1945    Date of Office Visit:  8/30/2021  Date of Hospital Admission: 8/20/21  Date of Hospital Discharge: 8/20/21  Risk of hospital readmission (high >=14%. Medium >=10%) :Readmission Risk Score: 16      Care management risk score Rising risk (score 2-5) and Complex Care (Scores >=6): 1     Non face to face  following discharge, date last encounter closed (first attempt may have been earlier): 8/16/2021  4:18 PM    Call initiated 2 business days of discharge: Yes    Patient Active Problem List   Diagnosis    Easy bruising    Rib pain    Right foot pain    Fatigue    Adenopathy    Dermatitis    Abdominal wall pain    Depression with anxiety    Costochondritis:left    Rib pain:left    Other chest pain    Degenerative cervical disc    Degenerative arthritis of thoracic spine    Chronic back pain    Elevated blood-pressure reading without diagnosis of hypertension    Arthritis of hand, right    Cyst in hand:Right    Mass of right hand    Dupuytren's disease of palm    Osteoarthritis of left knee    BPH (benign prostatic hyperplasia)    HTN (hypertension)    Constipation    Elevated WBC count    Prostate problem    ? CVA (cerebral vascular accident)    Left arm weakness    Drooling:left sided    Bilateral carotid artery stenosis <50%    Abnormal glucose    Bilateral hip pain    Impaired fasting glucose    Osteopenia per CXR    Irregular heart beats sensation    Palpitations    Parkinson disease (HCC)    Angina pectoris (HCC)    Abnormal stress ECG    Vocal cord paresis    Dysphagia    Mild recurrent major depression (HCC)    Anxiety    S/P right knee replacement    Erectile dysfunction    Hyperlipidemia, mixed    Thigh injury    Pain of left thigh    Skin tear of left hand without complication    Skin tear of right hand without complication    Frequent times daily 0800, 1200, 1600, 2000       imipramine (TOFRANIL) 25 MG tablet TAKE 1 TABLET NIGHTLY 90 tablet 0    lidocaine (LIDODERM) 5 % Place 1 patch onto the skin daily 12 hours on, 12 hours off. 30 patch 0    modafinil (PROVIGIL) 100 MG tablet Take  mg by mouth every morning.  simvastatin (ZOCOR) 10 MG tablet Take 1 tablet by mouth nightly 90 tablet 3    donepezil (ARICEPT) 5 MG tablet Take 10 mg by mouth nightly       linaclotide (LINZESS) 290 MCG CAPS capsule Take 290 mcg by mouth every morning (before breakfast)      clonazePAM (KLONOPIN) 0.5 MG tablet Take 1 mg by mouth nightly       carbidopa-levodopa (SINEMET)  MG per tablet Take 2 tablets by mouth 4 times daily 0800, 1200, 1600 and 2000. (with Rytary)      Cholecalciferol (VITAMIN D3) 2000 UNITS CAPS   Take 1 capsule by mouth daily       aspirin 81 MG EC tablet   Take 81 mg by mouth three times a week On Mondays, Wednesdays and Fridays      Polyethylene Glycol 3350 (MIRALAX PO)   Take 17 g by mouth daily as needed       pantoprazole (PROTONIX) 40 MG tablet   Take 40 mg by mouth daily           Medications patient taking as of now reconciled against medications ordered at time of hospital discharge: Yes    Chief Complaint   Patient presents with   [address] from Hospital       History of Present illness - Follow up of Hospital diagnosis(es): fall, suture removal.    Inpatient course: Discharge summary reviewed- see chart. Interval history/Current status:     A comprehensive review of systems was negative except for what was noted in the HPI. Vitals:    08/30/21 1334   BP: 136/74   Pulse: 71   Resp: 16   Temp: 98.3 °F (36.8 °C)   TempSrc: Temporal   SpO2: 96%   Weight: 156 lb 12.8 oz (71.1 kg)   Height: 5' 8\" (1.727 m)     Body mass index is 23.84 kg/m².    Wt Readings from Last 3 Encounters:   08/30/21 156 lb 12.8 oz (71.1 kg)   08/15/21 151 lb 7.3 oz (68.7 kg)   07/22/21 159 lb (72.1 kg)     BP Readings from Last 3 Encounters:   08/30/21 136/74   08/20/21 (!) 109/56   08/15/21 119/74        Physical Exam:  General Appearance: alert and oriented to person, place and time, well developed and well- nourished, in no acute distress  Skin: warm and dry, staples in head from fall  Head: normocephalic and atraumatic  Eyes: pupils equal, round, and reactive to light, extraocular eye movements intact, conjunctivae normal  ENT: tympanic membrane, external ear and ear canal normal bilaterally, nose without deformity, nasal mucosa and turbinates normal without polyps  Neck: supple and non-tender without mass, no thyromegaly or thyroid nodules, no cervical lymphadenopathy  Pulmonary/Chest: clear to auscultation bilaterally- no wheezes, rales or rhonchi, normal air movement, no respiratory distress  Cardiovascular: normal rate, regular rhythm, normal S1 and S2, no murmurs, rubs, clicks, or gallops, distal pulses intact, no carotid bruits  Abdomen: soft, non-tender, non-distended, normal bowel sounds, no masses or organomegaly  Extremities: no cyanosis, clubbing or edema  Musculoskeletal: normal range of motion, no joint swelling, deformity or tenderness  Neurologic: reflexes normal and symmetric, no cranial nerve deficit, gait, coordination and speech normal    Assessment/Plan:  There are no diagnoses linked to this encounter.       Medical Decision Making: straightforward

## 2021-09-01 NOTE — PLAN OF CARE
Physically deconditioned and I am worried that he lives alone at home with wife  Roxanne Fitzgerald when see he was not getting the right dose of sinemet  I have discussed with RN to please request PT/OT in the morning for reassessment  If he does well then can consider home health care  If he still has lower scores then he is high risk of falls at home and he will need rehab/snf for PT/OT Plan: Pt states that ketoconazole cream did not improve his dermatitis. Continue Regimen: OTC jock itch Detail Level: Zone Plan: Pt states that liquid nitrogen did not lead to improvement. He is no longer interested in treating.

## 2021-09-08 DIAGNOSIS — F33.0 MILD RECURRENT MAJOR DEPRESSION (HCC): ICD-10-CM

## 2021-09-08 DIAGNOSIS — F41.9 ANXIETY: ICD-10-CM

## 2021-09-14 RX ORDER — IMIPRAMINE HCL 25 MG
TABLET ORAL
Qty: 90 TABLET | Refills: 0 | Status: SHIPPED | OUTPATIENT
Start: 2021-09-14 | End: 2021-11-30

## 2021-10-15 ENCOUNTER — CLINICAL DOCUMENTATION (OUTPATIENT)
Dept: OTHER | Age: 76
End: 2021-10-15

## 2021-11-02 ENCOUNTER — OFFICE VISIT (OUTPATIENT)
Dept: CARDIOLOGY CLINIC | Age: 76
End: 2021-11-02
Payer: MEDICARE

## 2021-11-02 VITALS
WEIGHT: 163 LBS | SYSTOLIC BLOOD PRESSURE: 122 MMHG | DIASTOLIC BLOOD PRESSURE: 70 MMHG | BODY MASS INDEX: 24.78 KG/M2 | HEART RATE: 60 BPM

## 2021-11-02 DIAGNOSIS — I25.10 CAD IN NATIVE ARTERY: Primary | ICD-10-CM

## 2021-11-02 DIAGNOSIS — R07.9 CHEST PAIN, UNSPECIFIED TYPE: ICD-10-CM

## 2021-11-02 DIAGNOSIS — I10 ESSENTIAL HYPERTENSION: ICD-10-CM

## 2021-11-02 PROCEDURE — G8428 CUR MEDS NOT DOCUMENT: HCPCS | Performed by: INTERNAL MEDICINE

## 2021-11-02 PROCEDURE — 4040F PNEUMOC VAC/ADMIN/RCVD: CPT | Performed by: INTERNAL MEDICINE

## 2021-11-02 PROCEDURE — 99214 OFFICE O/P EST MOD 30 MIN: CPT | Performed by: INTERNAL MEDICINE

## 2021-11-02 PROCEDURE — G8420 CALC BMI NORM PARAMETERS: HCPCS | Performed by: INTERNAL MEDICINE

## 2021-11-02 PROCEDURE — 1123F ACP DISCUSS/DSCN MKR DOCD: CPT | Performed by: INTERNAL MEDICINE

## 2021-11-02 PROCEDURE — G8484 FLU IMMUNIZE NO ADMIN: HCPCS | Performed by: INTERNAL MEDICINE

## 2021-11-02 PROCEDURE — 1036F TOBACCO NON-USER: CPT | Performed by: INTERNAL MEDICINE

## 2021-11-02 RX ORDER — FESOTERODINE FUMARATE 8 MG/1
TABLET, FILM COATED, EXTENDED RELEASE ORAL
COMMUNITY
End: 2022-08-25

## 2021-11-02 NOTE — PROGRESS NOTES
Subjective:      Patient ID: Gilda Saldaña is a 68 y.o. male. HPI Rosita Shelton is being seen for  follow up CAD/angina/HTN. Bothered by [employer]. No sob. No chest pain. No tachycardia. No syncope. No pnd or orthopnea. No edema. Wt stable. BP ok at home. Past Medical History:   Diagnosis Date    Anxiety     PCP in [place]:Dr. Courtney Merritt.    Arthritis     Arthritis of hand, right 6/20/2012    Sneed esophagus     Dr. Ky Waldrop. EGD:1/17/2012. Next EGD due in 3yrs=1/2015    Sneed esophagus     Under care of GI    Bilateral carotid artery stenosis <50% 4/30/2014    BPH (benign prostatic hypertrophy)     Dr. Abiel Dockery. PSA per urology.     CAD (coronary artery disease)     under care of cards:Dr. Louise Velazquez    Carotid stenosis     Chest pain     pt states he no chest pain in 5 yrs, panic attack    Chronic back pain 1/26/2012    Chronic back pain     under care of ortho spine:Dr. Sweeney    Constipation 8/6/2013    Degenerative arthritis of thoracic spine 1/26/2012    Degenerative cervical disc 1/26/2012    Depression with anxiety 11/15/2011    Klonopin per neurologist(Dr. Jennifer Cruz)    Diverticulosis 1/17/2012    colonoscopy    Elevated PSA     8/21/14;5/2013:under care of Dr. Beltran/[place]:Urology group    Erectile dysfunction 6/13/2017    Essential hypertension, benign 6/20/2012    cardiologist:Dr. Carrie Morales    GERD (gastroesophageal reflux disease)     Hemorrhoid     Hiatal hernia     small    Hyperlipidemia     Impaired fasting glucose 5/19/2014    Osteopenia per CXR 5/19/2014    Parkinson disease ([facility] [address]     Dr. Samina Ortiz Neurology    Renal stone 4/2012    4 mm distal left ureteral calculus:Dr. Beltran:urologist    RLS (restless legs syndrome)     S/P colonoscopy 2011;5/20/19    Dr. Kaiden Grey per pt' next is in 10yrs-2021. 5/20/19(Dr. Clifton Sierra)    S/P endoscopy 1/2012    Dr. Walters Phlekarl acute changes:+Barrets:next in 3yrs 1/2015    Sigmoidoscopy exam 1/17/2012     Mangles:No acute changes. Next in 5yrs=1/2017    Therapeutic drug monitoring 5/14/15    Consistent OARRS report on 5/14/15;8/4/15    Thoracic spondylosis     under care of ortho spine:Dr. Alfredo Ford    Venous insufficiency of leg     Vocal cord paresis 5/11/2016    under care of ENT:Dr. Immanuel Chew. s/p vocal cord augmentation 6/2016 at [address]     Past Surgical History:   Procedure Laterality Date    CATARACT REMOVAL      COLONOSCOPY  2002;2011    COLONOSCOPY  8/13/2021    COLONOSCOPY DIAGNOSTIC performed by Lennie Duong MD at [facility] Right 02/23/2017    Right TKR(knee)    KNEE ARTHROPLASTY Left 7/30/13    LEFT TOTAL KNEE ARTHROPLASTY              KNEE CARTILAGE SURGERY      Left x 2, right x1    LITHOTRIPSY      Kidney stone removal as per urology:Stent removed after 10days    OSTEOTOMY Left     TURP N/A [id number]    TRANSURETHRAL RESECTION OF PROSTATE    UPPER GASTROINTESTINAL ENDOSCOPY  5/07       Allergies   Allergen Reactions    Levofloxacin Swelling     lips swell        Social History     Socioeconomic History    Marital status:      Spouse name: Not on file    Number of children: 2    Years of education: Not on file    Highest education level: Not on file   Occupational History    Occupation: retired    Tobacco Use    Smoking status: Never Smoker    Smokeless tobacco: Never Used   Vaping Use    Vaping Use: Former   Substance and Sexual Activity    Alcohol use:  Yes     Alcohol/week: 0.0 standard drinks     Comment: occasionally    Drug use: No    Sexual activity: Yes     Partners: Female   Other Topics Concern    Not on file   Social History Narrative    Not on file     Social Determinants of Health     Financial Resource Strain: Low Risk     Difficulty of Paying Living Expenses: Not hard at all   Food Insecurity: No Food Insecurity    Worried About [address] in the Last Year: Never true    [address] [facility] [address] in the Last Year: Never true   Transportation Needs: No Transportation Needs    Lack of Transportation (Medical): No    Lack of Transportation (Non-Medical): No   Physical Activity:     Days of Exercise per Week:     Minutes of Exercise per Session:    Stress:     Feeling of Stress :    Social Connections:     Frequency of Communication with Friends and Family:     Frequency of Social Gatherings with Friends and Family:     Attends [employer] Services:     Active Member of Clubs or Organizations:     Attends Club or Organization Meetings:     Marital Status:    Intimate Partner Violence:     Fear of Current or Ex-Partner:     Emotionally Abused:     Physically Abused:     Sexually Abused:               Current Outpatient Medications   Medication Sig Dispense Refill    Fesoterodine Fumarate ER (TOVIAZ) 8 MG TB24 Take by mouth      imipramine (TOFRANIL) 25 MG tablet TAKE 1 TABLET NIGHTLY 90 tablet 0    Melatonin 10 MG TABS Take by mouth nightly as needed      modafinil (PROVIGIL) 100 MG tablet Take  mg by mouth every morning.  simvastatin (ZOCOR) 10 MG tablet Take 1 tablet by mouth nightly 90 tablet 3    donepezil (ARICEPT) 5 MG tablet Take 10 mg by mouth nightly       linaclotide (LINZESS) 290 MCG CAPS capsule Take 290 mcg by mouth every morning (before breakfast)      clonazePAM (KLONOPIN) 0.5 MG tablet Take 1 mg by mouth nightly       carbidopa-levodopa (SINEMET)  MG per tablet Take 2 tablets by mouth 4 times daily 0800, 1200, 1600 and 2000.    (with Rytary)      Cholecalciferol (VITAMIN D3) 2000 UNITS CAPS   Take 1 capsule by mouth daily       aspirin 81 MG EC tablet   Take 81 mg by mouth three times a week On Mondays, Wednesdays and Fridays      Polyethylene Glycol 3350 (MIRALAX PO)   Take 17 g by mouth daily as needed       pantoprazole (PROTONIX) 40 MG tablet   Take 40 mg by mouth daily       Carbidopa-Levodopa ER (RYTARY) 48. MG CPCR Take 1 tablet by mouth 4 times daily 0800, 1200, 1600, 2000  (Patient not taking: Reported on 11/2/2021)      lidocaine (LIDODERM) 5 % Place 1 patch onto the skin daily 12 hours on, 12 hours off. 30 patch 0     No current facility-administered medications for this visit. Vitals:    11/02/21 1049   BP: 122/70   Pulse: 60       Wt 163    Review of Systems   Constitutional: Negative for activity change. Some  fatigue. Respiratory: Negative for apnea, cough, choking, Has chest tightness and shortness of breath. Cardiovascular: Negative for chest pain, palpitations and leg swelling. No PND or orthopnea. No tachycardia. Gastrointestinal: Negative for abdominal distention. Musculoskeletal: Negative for myalgias. Neurological: Negative for light-headedness. Negative for dizziness and syncope. Psychiatric/Behavioral: Negative for behavioral problems, confusion and agitation. All other systems reviewed negative as done. Objective:   Physical Exam   Constitutional: He is oriented to person, place, and time. He appears well-developed and well-nourished. No distress. HENT:   Head: Normocephalic and atraumatic. Eyes: Conjunctivae and EOM are normal. Right eye exhibits no discharge. Left eye exhibits no discharge. Neck: Normal range of motion. Neck supple. No JVD present. Cardiovascular: Normal rate, regular rhythm, S1 normal, S2 normal and normal heart sounds. Exam reveals no gallop. No murmur heard. Pulses:       Radial pulses are 2+ on the right side, and 2+ on the left side. Pulmonary/Chest: Effort normal and breath sounds normal. No respiratory distress. He has no wheezes. He has no rales. Abdominal: Soft. Bowel sounds are normal. There is no tenderness. Musculoskeletal: Normal range of motion. He exhibits no edema. Neurological: He is alert and oriented to person, place, and time. Skin: Skin is warm and dry. Psychiatric: He has a normal mood and affect.  His behavior is normal. Thought content normal. Assessment:       Diagnosis Orders   1. CAD in native artery     2. Essential hypertension     3. Chest pain, unspecified type             Plan:      CV stable. Bothered by progressive parkinsons. BP good. No chest pain/sob. Will continue ASA/zocor for cad. Suggested using walker all the time since falling. Lipids per PCP. Reviewed previous records and testing including cath 8/15 and myoview 6/21. Follow up 6 months.

## 2021-11-27 DIAGNOSIS — F41.9 ANXIETY: ICD-10-CM

## 2021-11-27 DIAGNOSIS — F33.0 MILD RECURRENT MAJOR DEPRESSION (HCC): ICD-10-CM

## 2021-11-30 RX ORDER — IMIPRAMINE HCL 25 MG
TABLET ORAL
Qty: 90 TABLET | Refills: 0 | Status: SHIPPED | OUTPATIENT
Start: 2021-11-30

## 2022-01-19 ENCOUNTER — TELEPHONE (OUTPATIENT)
Dept: ORTHOPEDIC SURGERY | Age: 77
End: 2022-01-19

## 2022-01-26 ENCOUNTER — TELEPHONE (OUTPATIENT)
Dept: ORTHOPEDIC SURGERY | Age: 77
End: 2022-01-26

## 2022-01-26 NOTE — TELEPHONE ENCOUNTER
Attempted to contact patient to inform them about the [facility] knee  joint pain outreach program. Patient can call [phone] to find out more information or to schedule an appointment with a joint pain orthopedic specialist.

## 2022-02-26 DIAGNOSIS — F41.9 ANXIETY: ICD-10-CM

## 2022-02-26 DIAGNOSIS — F33.0 MILD RECURRENT MAJOR DEPRESSION (HCC): ICD-10-CM

## 2022-02-28 RX ORDER — IMIPRAMINE HCL 25 MG
TABLET ORAL
Qty: 90 TABLET | Refills: 0 | OUTPATIENT
Start: 2022-02-28

## 2022-02-28 NOTE — TELEPHONE ENCOUNTER
Requested Prescriptions     Pending Prescriptions Disp Refills    simvastatin (ZOCOR) 10 MG tablet [Pharmacy Med Name: SIMVASTATIN 10 MG Tablet] 90 tablet 3     Sig: TAKE 1 TABLET EVERY NIGHT          Number: 90    Refills: 3    Last Office Visit: 11/2/2021     Next Office Visit: 5/17/22

## 2022-03-07 RX ORDER — SIMVASTATIN 10 MG
TABLET ORAL
Qty: 90 TABLET | Refills: 3 | Status: SHIPPED | OUTPATIENT
Start: 2022-03-07

## 2022-04-11 ENCOUNTER — TELEPHONE (OUTPATIENT)
Dept: FAMILY MEDICINE CLINIC | Age: 77
End: 2022-04-11

## 2022-04-11 NOTE — TELEPHONE ENCOUNTER
Pt calling to ask if he can be seen by you as a patient?   Was previously Dr Vinnie Garcia pt    Please advise    Last OV:  8-30-21 by CNP    CB:  [phone]

## 2022-07-30 ENCOUNTER — APPOINTMENT (OUTPATIENT)
Dept: CT IMAGING | Age: 77
End: 2022-07-30
Payer: MEDICARE

## 2022-07-30 ENCOUNTER — APPOINTMENT (OUTPATIENT)
Dept: GENERAL RADIOLOGY | Age: 77
End: 2022-07-30
Payer: MEDICARE

## 2022-07-30 ENCOUNTER — HOSPITAL ENCOUNTER (EMERGENCY)
Age: 77
Discharge: HOME | End: 2022-07-30
Attending: STUDENT IN AN ORGANIZED HEALTH CARE EDUCATION/TRAINING PROGRAM
Payer: MEDICARE

## 2022-07-30 VITALS
RESPIRATION RATE: 18 BRPM | HEART RATE: 54 BPM | OXYGEN SATURATION: 97 % | TEMPERATURE: 98.4 F | SYSTOLIC BLOOD PRESSURE: 151 MMHG | BODY MASS INDEX: 26.07 KG/M2 | HEIGHT: 68 IN | DIASTOLIC BLOOD PRESSURE: 83 MMHG | WEIGHT: 172 LBS
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
## 2022-07-30 DIAGNOSIS — S22.41XA CLOSED FRACTURE OF MULTIPLE RIBS OF RIGHT SIDE, INITIAL ENCOUNTER: ICD-10-CM

## 2022-07-30 LAB
BILIRUBIN URINE: NEGATIVE
BLOOD, URINE: NEGATIVE
CLARITY: CLEAR
COLOR: YELLOW
GLUCOSE URINE: NEGATIVE MG/DL
KETONES, URINE: 15 MG/DL
LEUKOCYTE ESTERASE, URINE: NEGATIVE
MICROSCOPIC EXAMINATION: ABNORMAL
NITRITE, URINE: NEGATIVE
PH UA: 7 (ref 5–8)
PROTEIN UA: NEGATIVE MG/DL
SPECIFIC GRAVITY UA: 1.01 (ref 1–1.03)
URINE TYPE: ABNORMAL
UROBILINOGEN, URINE: 0.2 E.U./DL

## 2022-07-30 PROCEDURE — 71046 X-RAY EXAM CHEST 2 VIEWS: CPT

## 2022-07-30 PROCEDURE — 73552 X-RAY EXAM OF FEMUR 2/>: CPT

## 2022-07-30 PROCEDURE — 81003 URINALYSIS AUTO W/O SCOPE: CPT

## 2022-07-30 PROCEDURE — 72125 CT NECK SPINE W/O DYE: CPT

## 2022-07-30 PROCEDURE — 70450 CT HEAD/BRAIN W/O DYE: CPT

## 2022-07-30 PROCEDURE — 6360000002 HC RX W HCPCS: Performed by: STUDENT IN AN ORGANIZED HEALTH CARE EDUCATION/TRAINING PROGRAM

## 2022-07-30 PROCEDURE — 96374 THER/PROPH/DIAG INJ IV PUSH: CPT

## 2022-07-30 PROCEDURE — 73110 X-RAY EXAM OF WRIST: CPT

## 2022-07-30 PROCEDURE — 71250 CT THORAX DX C-: CPT

## 2022-07-30 PROCEDURE — 99284 EMERGENCY DEPT VISIT MOD MDM: CPT

## 2022-07-30 PROCEDURE — 72170 X-RAY EXAM OF PELVIS: CPT

## 2022-07-30 PROCEDURE — 6370000000 HC RX 637 (ALT 250 FOR IP): Performed by: STUDENT IN AN ORGANIZED HEALTH CARE EDUCATION/TRAINING PROGRAM

## 2022-07-30 RX ORDER — KETOROLAC TROMETHAMINE 30 MG/ML
15 INJECTION, SOLUTION INTRAMUSCULAR; INTRAVENOUS ONCE
Status: COMPLETED | OUTPATIENT
Start: 2022-07-30 | End: 2022-07-30

## 2022-07-30 RX ORDER — ACETAMINOPHEN 325 MG/1
325 TABLET ORAL EVERY 6 HOURS PRN
Qty: 60 TABLET | Refills: 0 | Status: SHIPPED | OUTPATIENT
Start: 2022-07-30

## 2022-07-30 RX ORDER — METHOCARBAMOL 500 MG/1
1000 TABLET, FILM COATED ORAL ONCE
Status: COMPLETED | OUTPATIENT
Start: 2022-07-30 | End: 2022-07-30

## 2022-07-30 RX ORDER — LIDOCAINE 4 G/G
1 PATCH TOPICAL DAILY
Status: DISCONTINUED | OUTPATIENT
Start: 2022-07-30 | End: 2022-07-30 | Stop reason: HOSPADM

## 2022-07-30 RX ORDER — ACETAMINOPHEN 325 MG/1
650 TABLET ORAL ONCE
Status: COMPLETED | OUTPATIENT
Start: 2022-07-30 | End: 2022-07-30

## 2022-07-30 RX ORDER — LIDOCAINE 4 G/G
1 PATCH TOPICAL DAILY
Qty: 30 PATCH | Refills: 0 | Status: SHIPPED | OUTPATIENT
Start: 2022-07-30 | End: 2022-08-25 | Stop reason: SDUPTHER

## 2022-07-30 RX ORDER — METHOCARBAMOL 500 MG/1
750 TABLET, FILM COATED ORAL 3 TIMES DAILY PRN
Qty: 60 TABLET | Refills: 0 | Status: SHIPPED | OUTPATIENT
Start: 2022-07-30 | End: 2022-08-09

## 2022-07-30 RX ADMIN — METHOCARBAMOL 1000 MG: 500 TABLET ORAL at 12:58

## 2022-07-30 RX ADMIN — ACETAMINOPHEN 650 MG: 325 TABLET ORAL at 12:57

## 2022-07-30 RX ADMIN — KETOROLAC TROMETHAMINE 15 MG: 30 INJECTION, SOLUTION INTRAMUSCULAR at 15:24

## 2022-07-30 ASSESSMENT — PAIN SCALES - GENERAL
PAINLEVEL_OUTOF10: 8
PAINLEVEL_OUTOF10: 8
PAINLEVEL_OUTOF10: 9

## 2022-07-30 NOTE — DISCHARGE INSTRUCTIONS
You should take Tylenol 325 to 650 mg every 6-8 hours, along with the Robaxin, and the lidocaine patches. The Robaxin should not interfere with your Parkinson's medications. Continue using your incentive spirometer/breathing machine, aiming to do it 5 times an hour while you are awake. You should return to the emergency department if you develop worsening shortness of breath, fever (100.4 or greater), chest discomfort that is new or different, abdominal pain, or altered mental status.

## 2022-07-30 NOTE — ED PROVIDER NOTES
ED Attending Attestation Note     Date of evaluation: 7/30/2022    This patient was seen by the resident. I have seen and examined the patient, agree with the workup, evaluation, management and diagnosis. The care plan has been discussed. My assessment reveals a chronically ill-appearing 40-year-old male presenting with right-sided rib and right leg pain after a fall yesterday. Patient has a history of Parkinson's and walks with a cane. He had some low back injections performed and then was out with his wife when he got tangled up in his cane and fell. He struck his head but did not lose consciousness. He has been ambulatory since the fall, but today had increased pain in his right ribs and his right leg making it difficult for him to get up from his chair. On exam, he has tenderness to palpation over his right ribs, right abdomen, and right hip. We will obtain imaging to evaluate for injury.      Jefry Brunson MD  07/30/22 1400

## 2022-07-30 NOTE — ED NOTES
Patient prepared for and ready to be discharged. Patient discharged at this time in no acute distress after verbalizing understanding of discharge instructions. Patient left after receiving After Visit Summary instructions.       Shannon Donis RN  07/30/22 [id number]

## 2022-07-30 NOTE — ED NOTES
Patient BIBA from home for fall at [facility] yesterday. Patient reports falling and hitting head. -LOC   Complains of right side rib pain. Patient with hx of Parkinson's.      Torres Shea RN  07/30/22 3807

## 2022-07-30 NOTE — ED PROVIDER NOTES
[address] RESIDENT NOTE       Date of evaluation: 7/30/2022    Chief Complaint     Rib Injury (Fall at [facility] while shopping with wife. Hx of Parkinson's)      of Present Illness     Gerri Frankel is a 68 y.o. male who presents with a past medical history of CKD, carotid stenosis, CAD presenting with rib pain. Patient was at a store yesterday when he lost his footing, after getting up too quickly, and fell primarily on the right side of his body. He did not seek evaluation at this time, as the patient has a longstanding history of falls, and was able to move around. Today, the patient's wife was unable to get him up out of the chair due to pain on the right side. He has pain primarily over the right aspect of his chest wall, as well as his right hip and his right thigh. He rates the pain at a 10 out of 10 in severity. Patient denies any chest pain, dyspnea, palpitations either preceding the fall yesterday or currently. He is not on anticoagulation. Patient reports striking his head yesterday but did not lose consciousness. He has a known history of Parkinson's disease, for which she is on Sinemet and is due for another dose here in the emergency department. Review of Systems     Review of Systems  Positive for fall, right rib pain, hip pain, right leg pain. Negative for chest pain, LOC, neck pain, abdominal pain. A comprehensive review of systems was performed and was negative aside from what is reported in the HPI.   Past Medical, Surgical, Family, and Social History     He has a past medical history of Anxiety, Arthritis, Arthritis of hand, right, Sneed esophagus, Sneed esophagus, Bilateral carotid artery stenosis <50%, BPH (benign prostatic hypertrophy), CAD (coronary artery disease), Carotid stenosis, Chest pain, Chronic back pain, Chronic back pain, Constipation, Degenerative arthritis of thoracic spine, Degenerative cervical disc, 48. MG CPCR Take 1 tablet by mouth 4 times daily 0800, 1200, 1600, 2000 Historical Med      lidocaine (LIDODERM) 5 % Place 1 patch onto the skin daily 12 hours on, 12 hours off., Disp-30 patch, R-0Print      modafinil (PROVIGIL) 100 MG tablet Take  mg by mouth every morning. Historical Med      donepezil (ARICEPT) 5 MG tablet Take 10 mg by mouth nightly Historical Med      linaclotide (LINZESS) 290 MCG CAPS capsule Take 290 mcg by mouth every morning (before breakfast)      clonazePAM (KLONOPIN) 0.5 MG tablet Take 1 mg by mouth nightly Historical Med      carbidopa-levodopa (SINEMET)  MG per tablet Take 2 tablets by mouth 4 times daily 0800, 1200, 1600 and 2000. (with Rytary)Historical Med      Cholecalciferol (VITAMIN D3) 2000 UNITS CAPS   Take 1 capsule by mouth daily       aspirin 81 MG EC tablet   Take 81 mg by mouth three times a week On Mondays, Wednesdays and Fridays      Polyethylene Glycol 3350 (MIRALAX PO)   Take 17 g by mouth daily as needed       pantoprazole (PROTONIX) 40 MG tablet   Take 40 mg by mouth daily        ! ! - Potential duplicate medications found. Please discuss with provider. Allergies     He is allergic to levofloxacin. Physical Exam     INITIAL VITALS: BP: (!) 151/83, Temp: 98.4 °F (36.9 °C), Heart Rate: 54, Resp: 18, SpO2: 97 %   Physical Exam  Vitals reviewed. Constitutional:       General: He is not in acute distress. Appearance: Normal appearance. He is not ill-appearing or diaphoretic. HENT:      Head: Normocephalic and atraumatic. Nose: Nose normal.   Eyes:      General: No scleral icterus. Right eye: No discharge. Left eye: No discharge. Extraocular Movements: Extraocular movements intact. Conjunctiva/sclera: Conjunctivae normal.      Pupils: Pupils are equal, round, and reactive to light. Cardiovascular:      Rate and Rhythm: Normal rate and regular rhythm. Pulses: Normal pulses.       Heart sounds: Normal heart sounds. No murmur heard. No friction rub. No gallop. Pulmonary:      Effort: Pulmonary effort is normal. No respiratory distress. Breath sounds: Normal breath sounds. No stridor. No wheezing, rhonchi or rales. Chest:      Comments: Right-sided chest wall pain   Abdominal:      General: Abdomen is flat. There is no distension. Palpations: Abdomen is soft. There is no mass. Tenderness: There is no abdominal tenderness. There is no guarding or rebound. Musculoskeletal:      Right lower leg: No edema. Left lower leg: No edema. Skin:     General: Skin is warm and dry. Capillary Refill: Capillary refill takes less than 2 seconds. Comments: Skin tear over right wrist    Neurological:      General: No focal deficit present. Mental Status: He is alert and oriented to person, place, and time. Cranial Nerves: No cranial nerve deficit. Sensory: No sensory deficit. Coordination: Coordination normal.   Psychiatric:         Mood and Affect: Mood normal.         Behavior: Behavior normal.       DiagnosticResults     EKG   Not performed     RADIOLOGY:  CT CHEST WO CONTRAST   Final Result      Nondisplaced fractures of the right lateral sixth and seventh ribs. No other acute intrathoracic abnormality. Bibasilar areas of scarring and/or atelectasis. INCIDENTAL FINDINGS: None. XR WRIST RIGHT (MIN 3 VIEWS)   Final Result      No acute osseous findings. CT CSpine W/O Contrast   Final Result      1. No evidence of cervical spine fracture or traumatic subluxation. 2.  Multilevel degenerate disc disease and degenerative joint disease. CT Head W/O Contrast   Final Result      No acute intracranial hemorrhage or mass effect. Mild chronic small vessel ischemic change.       XR CHEST (2 VW)   Final Result      No acute radiographic abnormality of the chest.      XR FEMUR RIGHT (MIN 2 VIEWS)   Final Result      Pelvis:   Mild bilateral hip osteoarthrosis without acute osseous abnormality. Right femur:   No acute osseous abnormality. Partially visualized right knee arthroplasty. XR PELVIS (1-2 VIEWS)   Final Result      Pelvis:   Mild bilateral hip osteoarthrosis without acute osseous abnormality. Right femur:   No acute osseous abnormality. Partially visualized right knee arthroplasty. LABS:   Results for orders placed or performed during the hospital encounter of 07/30/22   Urinalysis   Result Value Ref Range    Color, UA Yellow Straw/Yellow    Clarity, UA Clear Clear    Glucose, Ur Negative Negative mg/dL    Bilirubin Urine Negative Negative    Ketones, Urine 15 (A) Negative mg/dL    Specific Gravity, UA 1.015 1.005 - 1.030    Blood, Urine Negative Negative    pH, UA 7.0 5.0 - 8.0    Protein, UA Negative Negative mg/dL    Urobilinogen, Urine 0.2 <2.0 E.U./dL    Nitrite, Urine Negative Negative    Leukocyte Esterase, Urine Negative Negative    Microscopic Examination Not Indicated     Urine Type NotGiven        ED BEDSIDE ULTRASOUND:  No results found. RECENT VITALS:  BP: (!) 151/83, Temp: 98.4 °F (36.9 °C), Heart Rate: 54,Resp: 18, SpO2: 97 %     Procedures     None     ED Course     Nursing Notes, Past Medical Hx, Past Surgical Hx, Social Hx, Allergies, and Family Hx were reviewed. The patient was given the followingmedications:  Orders Placed This Encounter   Medications    DISCONTD: lidocaine 4 % external patch 1 patch    acetaminophen (TYLENOL) tablet 650 mg    methocarbamol (ROBAXIN) tablet 1,000 mg    ketorolac (TORADOL) injection 15 mg    acetaminophen (TYLENOL) 325 MG tablet     Sig: Take 1 tablet by mouth every 6 hours as needed for Pain     Dispense:  60 tablet     Refill:  0    lidocaine 4 % external patch     Sig: Place 1 patch onto the skin in the morning.      Dispense:  30 patch     Refill:  0    methocarbamol (ROBAXIN) 500 MG tablet     Sig: Take 1.5 tablets by mouth 3 times daily as needed (moderate pain)     Dispense:  60 tablet     Refill:  0       CONSULTS:  None    MEDICAL DECISION MAKING / ASSESSMENT / PLAN     Yuridia Iraheta is a 68 y.o. male with past medical history as described in the HPI presenting with a fall that occurred yesterday, now with worsening right-sided pain, particularly of the right chest wall and right leg. On initial examination, patient hemodynamically within normal limits and in no acute distress. He has no obvious neurologic findings, but given the fact that he did strike his head, we will plan to obtain head and C-spine imaging which were ultimately negative for acute traumatic pathology. Chest x-ray was also performed which was negative, and plain films over an area where he sustained a skin tear on the right wrist were negative. UTD on Tdap from 2016. Negative pelvis, negative femur. Patient is able to move the leg without difficulty, and I suspect that this is more likely a contusion. On reassessment, patient continues to report persistent pain over the right chest wall despite a negative chest x-ray. Given concern for occult underlying rib pathology, CT chest was obtained which reveals a lateral sixth and seventh rib fracture. No pneumothorax corresponding to this area. Patient was able to perform incentive spirometry, well above 2000 cc. Given that he is oxygenating well, and overall appears to have adequate pain control, I feel that he is appropriate for discharge to home. He was provided with multimodal pain control including Robaxin, which is not contraindicated with his Sinemet or Parkinson's disease. We will also plan to send out on Tylenol and lidocaine patches, we will avoid NSAIDs due to the fact that he has CKD. Return precautions discussed, patient has a primary care provider he feels comfortable arranging follow-up with. Ultimately he was deemed appropriate for discharged home.     This patient was also evaluated by the attending physician. All care plans werediscussed and agreed upon. Clinical Impression     1. Fall, initial encounter    2. Closed fracture of multiple ribs of right side, initial encounter        Disposition     PATIENT REFERRED TO:  No follow-up provider specified.     DISCHARGE MEDICATIONS:  Discharge Medication List as of 7/30/2022  4:14 PM        START taking these medications    Details   acetaminophen (TYLENOL) 325 MG tablet Take 1 tablet by mouth every 6 hours as needed for Pain, Disp-60 tablet, R-0Print      lidocaine 4 % external patch Place 1 patch onto the skin in the morning., TransDERmal, DAILY Starting Sat 7/30/2022, Until Mon 8/29/2022, For 30 days, Disp-30 patch, R-0, Print      methocarbamol (ROBAXIN) 500 MG tablet Take 1.5 tablets by mouth 3 times daily as needed (moderate pain), Disp-60 tablet, R-0Print             DISPOSITION Decision To Discharge 07/30/2022 04:04:36 PM       Yahaira Lewis MD  Resident  07/30/22 [id number]

## 2022-08-19 ENCOUNTER — TELEPHONE (OUTPATIENT)
Dept: FAMILY MEDICINE CLINIC | Age: 77
End: 2022-08-19

## 2022-08-19 NOTE — TELEPHONE ENCOUNTER
----- Message from Braxton Ray, [address] sent at 8/12/2022  3:45 PM EDT -----  Subject: Appointment Request    Reason for Call: New Patient/New to Provider Appointment needed: New   Patient Request Appointment    QUESTIONS    Reason for appointment request? No appointments available during search     Additional Information for Provider? Pts wife Santiago Landers called and stated   that the pt needs a new PCP because his PCP is leaving. and needs a ED   F/U. Master Mckinnon states that Dr. Lyle Otero is her PCP and would like for the pt to   have the same PCP. No availabilities on our end to schedule new pt   appointment. Please call Master Mckinnon back.    ---------------------------------------------------------------------------  --------------  Ky Fraction [facility]  [phone]; OK to leave message on voicemail  ---------------------------------------------------------------------------  --------------  SCRIPT ANSWERS  COVID Screen: Clifton Joyce

## 2022-08-23 ENCOUNTER — TELEPHONE (OUTPATIENT)
Dept: FAMILY MEDICINE CLINIC | Age: 77
End: 2022-08-23

## 2022-08-25 ENCOUNTER — OFFICE VISIT (OUTPATIENT)
Dept: FAMILY MEDICINE CLINIC | Age: 77
End: 2022-08-25
Payer: MEDICARE

## 2022-08-25 VITALS
HEIGHT: 68 IN | WEIGHT: 151 LBS | DIASTOLIC BLOOD PRESSURE: 66 MMHG | OXYGEN SATURATION: 97 % | HEART RATE: 57 BPM | BODY MASS INDEX: 22.88 KG/M2 | SYSTOLIC BLOOD PRESSURE: 112 MMHG

## 2022-08-25 DIAGNOSIS — G20 PARKINSON DISEASE (HCC): Primary | ICD-10-CM

## 2022-08-25 DIAGNOSIS — N40.0 BENIGN PROSTATIC HYPERPLASIA WITHOUT LOWER URINARY TRACT SYMPTOMS: ICD-10-CM

## 2022-08-25 DIAGNOSIS — Z11.59 NEED FOR HEPATITIS C SCREENING TEST: ICD-10-CM

## 2022-08-25 DIAGNOSIS — E78.2 HYPERLIPIDEMIA, MIXED: ICD-10-CM

## 2022-08-25 DIAGNOSIS — F41.8 DEPRESSION WITH ANXIETY: ICD-10-CM

## 2022-08-25 DIAGNOSIS — I10 PRIMARY HYPERTENSION: ICD-10-CM

## 2022-08-25 DIAGNOSIS — I65.23 BILATERAL CAROTID ARTERY STENOSIS: ICD-10-CM

## 2022-08-25 DIAGNOSIS — K59.00 CONSTIPATION, UNSPECIFIED CONSTIPATION TYPE: ICD-10-CM

## 2022-08-25 PROBLEM — K22.70 BARRETT ESOPHAGUS: Status: ACTIVE | Noted: 2022-08-25

## 2022-08-25 PROBLEM — G25.81 RLS (RESTLESS LEGS SYNDROME): Status: ACTIVE | Noted: 2022-08-25

## 2022-08-25 PROBLEM — K21.9 GERD (GASTROESOPHAGEAL REFLUX DISEASE): Status: ACTIVE | Noted: 2022-08-25

## 2022-08-25 PROCEDURE — 1036F TOBACCO NON-USER: CPT | Performed by: FAMILY MEDICINE

## 2022-08-25 PROCEDURE — 1123F ACP DISCUSS/DSCN MKR DOCD: CPT | Performed by: FAMILY MEDICINE

## 2022-08-25 PROCEDURE — 99214 OFFICE O/P EST MOD 30 MIN: CPT | Performed by: FAMILY MEDICINE

## 2022-08-25 PROCEDURE — G8427 DOCREV CUR MEDS BY ELIG CLIN: HCPCS | Performed by: FAMILY MEDICINE

## 2022-08-25 PROCEDURE — G8420 CALC BMI NORM PARAMETERS: HCPCS | Performed by: FAMILY MEDICINE

## 2022-08-25 RX ORDER — ALPRAZOLAM 0.5 MG/1
TABLET ORAL
COMMUNITY
Start: 2022-08-17 | End: 2022-08-25 | Stop reason: ALTCHOICE

## 2022-08-25 RX ORDER — OXYBUTYNIN CHLORIDE 10 MG/1
TABLET, EXTENDED RELEASE ORAL DAILY
COMMUNITY
Start: 2022-08-19

## 2022-08-25 RX ORDER — DONEPEZIL HYDROCHLORIDE 10 MG/1
10 TABLET, FILM COATED ORAL NIGHTLY
Qty: 30 TABLET | Refills: 5
Start: 2022-08-25

## 2022-08-25 SDOH — ECONOMIC STABILITY: FOOD INSECURITY: WITHIN THE PAST 12 MONTHS, THE FOOD YOU BOUGHT JUST DIDN'T LAST AND YOU DIDN'T HAVE MONEY TO GET MORE.: NEVER TRUE

## 2022-08-25 SDOH — ECONOMIC STABILITY: FOOD INSECURITY: WITHIN THE PAST 12 MONTHS, YOU WORRIED THAT YOUR FOOD WOULD RUN OUT BEFORE YOU GOT MONEY TO BUY MORE.: NEVER TRUE

## 2022-08-25 ASSESSMENT — PATIENT HEALTH QUESTIONNAIRE - PHQ9
SUM OF ALL RESPONSES TO PHQ QUESTIONS 1-9: 9
9. THOUGHTS THAT YOU WOULD BE BETTER OFF DEAD, OR OF HURTING YOURSELF: 0
SUM OF ALL RESPONSES TO PHQ QUESTIONS 1-9: 9
4. FEELING TIRED OR HAVING LITTLE ENERGY: 3
SUM OF ALL RESPONSES TO PHQ9 QUESTIONS 1 & 2: 3
7. TROUBLE CONCENTRATING ON THINGS, SUCH AS READING THE NEWSPAPER OR WATCHING TELEVISION: 0
3. TROUBLE FALLING OR STAYING ASLEEP: 3
1. LITTLE INTEREST OR PLEASURE IN DOING THINGS: 0
8. MOVING OR SPEAKING SO SLOWLY THAT OTHER PEOPLE COULD HAVE NOTICED. OR THE OPPOSITE, BEING SO FIGETY OR RESTLESS THAT YOU HAVE BEEN MOVING AROUND A LOT MORE THAN USUAL: 0
SUM OF ALL RESPONSES TO PHQ QUESTIONS 1-9: 9
6. FEELING BAD ABOUT YOURSELF - OR THAT YOU ARE A FAILURE OR HAVE LET YOURSELF OR YOUR FAMILY DOWN: 0
5. POOR APPETITE OR OVEREATING: 0
2. FEELING DOWN, DEPRESSED OR HOPELESS: 3
SUM OF ALL RESPONSES TO PHQ QUESTIONS 1-9: 9
10. IF YOU CHECKED OFF ANY PROBLEMS, HOW DIFFICULT HAVE THESE PROBLEMS MADE IT FOR YOU TO DO YOUR WORK, TAKE CARE OF THINGS AT HOME, OR GET ALONG WITH OTHER PEOPLE: 1

## 2022-08-25 ASSESSMENT — SOCIAL DETERMINANTS OF HEALTH (SDOH): HOW HARD IS IT FOR YOU TO PAY FOR THE VERY BASICS LIKE FOOD, HOUSING, MEDICAL CARE, AND HEATING?: NOT HARD AT ALL

## 2022-08-25 NOTE — PROGRESS NOTES
Patient is a 68y.o. year old male presenting for a complete physical today. He is a new patient and  of my patient. Last colonoscopy: 8/13/21 - Dr. Kourtney Sanders; Dr. Raz Gaviria was prior GI.   EGD: ?  - Dr. Raz Gaviria. Last PSA: per Urologist - Dr. Ed Marshall  Last eye exam: 8/24/22- no change- reading glasses only. Current smoker: no   Exercise: was doing Physical Therapy in the past.    Caffeine: only occasionally 1-2 / week   Alcohol: no       Cardio: Dr. Itzel Lima  GI: Dr. Raz Gaviria  Uro: Dr. Ed Marshall  Nephro: Dr. Christiano Carey  Neuro: Marilu Obrien- Umang Bobo- Dr. Indy Freiremerkiya   Pain: Dr. Ayala So 0.5 mg qhs for years - 5-6 years. H/o RLS. Seems to help help to sleep and settling in. Taking Melatonin 10 mg qhs. Some dysphagia- 1x/ day at dinner. No coughing spells with eating. He fell on 7/29/22 and suffered rib fracture. Slowly improving . Sleeping better. Past Medical History:   Diagnosis Date    Anxiety     PCP in [place]:Dr. Sage.    Arthritis     Arthritis of hand, right 6/20/2012    Sneed esophagus     Dr. Sundar Bland. EGD:1/17/2012. Next EGD due in 3yrs=1/2015    Sneed esophagus     Under care of GI    Bilateral carotid artery stenosis <50% 4/30/2014    BPH (benign prostatic hypertrophy)     Dr. Lindsay Pichardo. PSA per urology.     CAD (coronary artery disease)     under care of cards:Dr. Itzel Lima    Carotid stenosis     Chest pain     pt states he no chest pain in 5 yrs, panic attack    Chronic back pain 1/26/2012    Chronic back pain     under care of ortho spine:Dr. Sweeney    Constipation 8/6/2013    Degenerative arthritis of thoracic spine 1/26/2012    Degenerative cervical disc 1/26/2012    Depression with anxiety 11/15/2011    Klonopin per neurologist(Dr. Ann Marie Miller)    Diverticulosis 1/17/2012    colonoscopy    Elevated PSA     8/21/14;5/2013:under care of Dr. Beltran/[place]:Urology group    Erectile dysfunction 6/13/2017    Essential hypertension, benign 6/20/2012    cardiologist:Dr. Bridges [place]    GERD (gastroesophageal reflux disease)     Hemorrhoid     Hiatal hernia     small    Hyperlipidemia     Impaired fasting glucose 5/19/2014    Osteopenia per CXR 5/19/2014    Parkinson disease ([facility] [address]     Dr. Pizano Every [facility]    Renal stone 4/2012    4 mm distal left ureteral calculus:Dr. Beltran:urologist    RLS (restless legs syndrome)     S/P colonoscopy 2011;5/20/19    Dr. Ortiz Degree per pt' next is in 10yrs-2021. 5/20/19(Dr. Escobar Walsh)    S/P endoscopy 1/2012    Dr. Katarzyna Krishnamurthy acute changes:+Barrets:next in 3yrs 1/2015    Sigmoidoscopy exam 1/17/2012    Dr. Lama:No acute changes. Next in 5yrs=1/2017    Therapeutic drug monitoring 5/14/15    Consistent OARRS report on 5/14/15;8/4/15    Thoracic spondylosis     under care of ortho spine:Dr. Boris Dominique    Venous insufficiency of leg     Vocal cord paresis 5/11/2016    under care of ENT:Dr. Prieto Pittman. s/p vocal cord augmentation 6/2016 at [facility] of patient's past surgical history indicates:     Past Surgical History:   Procedure Laterality Date    CATARACT REMOVAL      COLONOSCOPY  2002;2011    COLONOSCOPY  8/13/2021    COLONOSCOPY DIAGNOSTIC performed by Flores Carroll MD at [facility] Right 02/23/2017    Right TKR(knee)    KNEE ARTHROPLASTY Left 7/30/13    LEFT TOTAL KNEE ARTHROPLASTY              KNEE CARTILAGE SURGERY      Left x 2, right x1    LITHOTRIPSY      Kidney stone removal as per urology:Stent removed after 10days    OSTEOTOMY Left     TURP N/A [id number]    TRANSURETHRAL RESECTION OF PROSTATE    UPPER GASTROINTESTINAL ENDOSCOPY  5/07                                                   Current Outpatient Medications   Medication Sig Dispense Refill    acetaminophen (TYLENOL) 325 MG tablet Take 1 tablet by mouth every 6 hours as needed for Pain 60 tablet 0    lidocaine 4 % external patch Place 1 patch onto the skin in the morning.  30 patch 0    simvastatin (ZOCOR) 10 MG tablet TAKE 1 TABLET EVERY NIGHT 90 tablet 3    imipramine (TOFRANIL) 25 MG tablet TAKE 1 TABLET NIGHTLY 90 tablet 0    Melatonin 10 MG TABS Take by mouth nightly as needed      lidocaine (LIDODERM) 5 % Place 1 patch onto the skin daily 12 hours on, 12 hours off. (Patient taking differently: Place 1 patch onto the skin as needed 12 hours on, 12 hours off.) 30 patch 0    donepezil (ARICEPT) 5 MG tablet Take 10 mg by mouth nightly       clonazePAM (KLONOPIN) 0.5 MG tablet Take 1 mg by mouth nightly       carbidopa-levodopa (SINEMET)  MG per tablet Take 2 tablets by mouth 4 times daily 0800, 1200, 1600 and 2000. (with Rytary)      Cholecalciferol (VITAMIN D3) 2000 UNITS CAPS   Take 1 capsule by mouth daily       aspirin 81 MG EC tablet   Take 81 mg by mouth three times a week On Mondays, Wednesdays and Fridays      Polyethylene Glycol 3350 (MIRALAX PO)   Take 17 g by mouth daily as needed       pantoprazole (PROTONIX) 40 MG tablet   Take 40 mg by mouth daily       oxybutynin (DITROPAN-XL) 10 MG extended release tablet       ALPRAZolam (XANAX) 0.5 MG tablet       midodrine (PROAMATINE) 5 MG tablet Take 1 tablet by mouth in the morning and at bedtime (Patient not taking: Reported on 8/25/2022) 90 tablet 3    midodrine (PROAMATINE) 5 MG tablet Take 1 tablet by mouth in the morning and at bedtime (Patient not taking: Reported on 8/25/2022) 90 tablet 3    Fesoterodine Fumarate ER (TOVIAZ) 8 MG TB24 Take by mouth (Patient not taking: Reported on 8/25/2022)      Carbidopa-Levodopa ER (RYTARY) 48. MG CPCR Take 1 tablet by mouth 4 times daily 0800, 1200, 1600, 2000       modafinil (PROVIGIL) 100 MG tablet Take  mg by mouth every morning. (Patient not taking: No sig reported)      linaclotide (LINZESS) 290 MCG CAPS capsule Take 290 mcg by mouth every morning (before breakfast) (Patient not taking: Reported on 8/25/2022)       No current facility-administered medications for this visit.        Allergies   Allergen Reactions    Levofloxacin Swelling     lips swell       Social History     Tobacco Use    Smoking status: Never    Smokeless tobacco: Never   Vaping Use    Vaping Use: Former   Substance Use Topics    Alcohol use: Yes     Alcohol/week: 0.0 standard drinks     Comment: occasionally    Drug use: No        Family History   Problem Relation Age of Onset    Cancer Sister 68        breast     Heart Disease Sister     Kidney Disease Father 61    Alcohol Abuse Father     Coronary Art Dis Mother 80    Coronary Art Dis Brother 77          Patient's allergies and medications were reviewed. Patient's past medical, surgical, social , and family history were reviewed. ROS:  Feeling well. No dyspnea or chest pain on exertion. No abdominal pain, change in bowel habits, black or bloody stools. No urinary tract or prostatic symptoms. No neurological complaints. See patient physical/  ROS questionnaire. OBJECTIVE:   /66   Pulse 57   Ht 5' 8\" (1.727 m)   Wt 151 lb (68.5 kg)   SpO2 97%   BMI 22.96 kg/m²   There were no vitals filed for this visit. The patient appears well, alert, oriented x 3, in no distress. HEENT : normocephalic, atraumatic, PERRLA, EOMI, tympanic membranes and nasopharynx are normal.  Neck supple. No adenopathy or thyromegaly. Upper extremities : DTRs 2+ biceps/ triceps/ brachioradialis bilateral.  FROM. Strength 5/5. Lungs are clear, good air entry, no wheezes, rhonchi or rales. Cardiovascular: regular rate  And rhythm. S1 and S2 are normal, no murmurs,rubs, and gallops. Abdomen is soft without tenderness, guarding, mass or organomegaly. Normal bowel sounds. Lower extremities : DTRs 2+ knees and ankles bilateral.  FROM. Strength 5/5. Negative straight leg-raise. No edema or erythema bilateral. normal peripheral pulses. Neuro: Cranial nerves 2-12 are normal. Deep tendon reflexes are 2+ and equal to all extremities. No focal sensory, or motor deficit noted.   Skin: no rashes or suspicious lesions. ASSESSMENT:   1. Parkinson disease ([facility] [address]  - Stable. Followed by Neurologist.   - Continue Aricept qd and Sinemet qid. - donepezil (ARICEPT) 10 MG tablet; Take 1 tablet by mouth nightly  Dispense: 30 tablet; Refill: 5  - CBC; Future  - TSH; Future    2. Primary hypertension  - Controlled. - Comprehensive Metabolic Panel; Future    3. Hyperlipidemia, mixed  - Stable. Continue Simvastatin 10 mg qd . - Lipid Panel; Future  - Comprehensive Metabolic Panel; Future    4. Benign prostatic hyperplasia without lower urinary tract symptoms  - Stable. 5. Bilateral carotid artery stenosis <50%  - Continue Aspirin 81 mg qd and Simvastatin 10 mg qd . - Ultrasound carotid doppler; Future    6. Depression with anxiety  - Stable. 7. Constipation, unspecified constipation type  - Stable. Continue Linzess 290 mg qd and Miralax prn.     8. Need for hepatitis C screening test  - Hepatitis C Antibody; Future        PLAN:   Follow a low fat, low cholesterol diet,  continue current healthy lifestyle patterns, including regular cardiovascular exercise >150 minutes per week,  and return for routine annual checkups    Follow up 3 months/ prn.

## 2022-08-26 ENCOUNTER — TELEPHONE (OUTPATIENT)
Dept: FAMILY MEDICINE CLINIC | Age: 77
End: 2022-08-26

## 2022-08-26 DIAGNOSIS — I65.23 BILATERAL CAROTID ARTERY STENOSIS: Primary | ICD-10-CM

## 2022-08-26 NOTE — TELEPHONE ENCOUNTER
[facility] from scheduling called saying the order needs to be changed from ultrasound to VL (vascular lab) carotid doppler. If you have any questions please call [phone].

## 2022-08-31 ENCOUNTER — APPOINTMENT (OUTPATIENT)
Dept: CT IMAGING | Age: 77
End: 2022-08-31
Payer: MEDICARE

## 2022-08-31 ENCOUNTER — HOSPITAL ENCOUNTER (EMERGENCY)
Age: 77
Discharge: HOME | End: 2022-09-01
Attending: EMERGENCY MEDICINE
Payer: MEDICARE

## 2022-08-31 ENCOUNTER — APPOINTMENT (OUTPATIENT)
Dept: GENERAL RADIOLOGY | Age: 77
End: 2022-08-31
Payer: MEDICARE

## 2022-08-31 DIAGNOSIS — S09.90XA INJURY OF HEAD, INITIAL ENCOUNTER: Primary | ICD-10-CM

## 2022-08-31 DIAGNOSIS — S02.2XXA CLOSED FRACTURE OF NASAL BONE, INITIAL ENCOUNTER: ICD-10-CM

## 2022-08-31 PROCEDURE — 90715 TDAP VACCINE 7 YRS/> IM: CPT | Performed by: STUDENT IN AN ORGANIZED HEALTH CARE EDUCATION/TRAINING PROGRAM

## 2022-08-31 PROCEDURE — 12011 RPR F/E/E/N/L/M 2.5 CM/<: CPT

## 2022-08-31 PROCEDURE — 6370000000 HC RX 637 (ALT 250 FOR IP): Performed by: STUDENT IN AN ORGANIZED HEALTH CARE EDUCATION/TRAINING PROGRAM

## 2022-08-31 PROCEDURE — 70486 CT MAXILLOFACIAL W/O DYE: CPT

## 2022-08-31 PROCEDURE — 70450 CT HEAD/BRAIN W/O DYE: CPT

## 2022-08-31 PROCEDURE — 99284 EMERGENCY DEPT VISIT MOD MDM: CPT

## 2022-08-31 PROCEDURE — 73562 X-RAY EXAM OF KNEE 3: CPT

## 2022-08-31 PROCEDURE — 90471 IMMUNIZATION ADMIN: CPT | Performed by: STUDENT IN AN ORGANIZED HEALTH CARE EDUCATION/TRAINING PROGRAM

## 2022-08-31 PROCEDURE — 6360000002 HC RX W HCPCS: Performed by: STUDENT IN AN ORGANIZED HEALTH CARE EDUCATION/TRAINING PROGRAM

## 2022-08-31 PROCEDURE — 71046 X-RAY EXAM CHEST 2 VIEWS: CPT

## 2022-08-31 PROCEDURE — 72125 CT NECK SPINE W/O DYE: CPT

## 2022-08-31 PROCEDURE — 96372 THER/PROPH/DIAG INJ SC/IM: CPT

## 2022-08-31 RX ORDER — ACETAMINOPHEN 325 MG/1
650 TABLET ORAL ONCE
Status: COMPLETED | OUTPATIENT
Start: 2022-08-31 | End: 2022-08-31

## 2022-08-31 RX ORDER — LIDOCAINE HYDROCHLORIDE AND EPINEPHRINE BITARTRATE 20; .01 MG/ML; MG/ML
20 INJECTION, SOLUTION SUBCUTANEOUS ONCE
Status: COMPLETED | OUTPATIENT
Start: 2022-08-31 | End: 2022-09-01

## 2022-08-31 RX ADMIN — ACETAMINOPHEN 650 MG: 325 TABLET, FILM COATED ORAL at 23:23

## 2022-08-31 RX ADMIN — TETANUS TOXOID, REDUCED DIPHTHERIA TOXOID AND ACELLULAR PERTUSSIS VACCINE, ADSORBED 0.5 ML: 5; 2.5; 8; 8; 2.5 SUSPENSION INTRAMUSCULAR at 22:44

## 2022-08-31 ASSESSMENT — PAIN - FUNCTIONAL ASSESSMENT: PAIN_FUNCTIONAL_ASSESSMENT: ACTIVITIES ARE NOT PREVENTED

## 2022-08-31 ASSESSMENT — ENCOUNTER SYMPTOMS
ABDOMINAL PAIN: 0
SHORTNESS OF BREATH: 0
EYE PAIN: 0
CONSTIPATION: 0
NAUSEA: 0
TROUBLE SWALLOWING: 0
CHEST TIGHTNESS: 0
WHEEZING: 0
FACIAL SWELLING: 1
VOMITING: 0
SINUS PAIN: 1
BLOOD IN STOOL: 0
COUGH: 0
DIARRHEA: 0

## 2022-08-31 ASSESSMENT — PAIN DESCRIPTION - DESCRIPTORS: DESCRIPTORS: THROBBING;SHARP

## 2022-08-31 ASSESSMENT — PAIN DESCRIPTION - LOCATION: LOCATION: FACE

## 2022-08-31 ASSESSMENT — PAIN DESCRIPTION - ORIENTATION: ORIENTATION: MID

## 2022-08-31 ASSESSMENT — PAIN SCALES - GENERAL: PAINLEVEL_OUTOF10: 9

## 2022-09-01 VITALS
DIASTOLIC BLOOD PRESSURE: 79 MMHG | TEMPERATURE: 97 F | OXYGEN SATURATION: 98 % | HEART RATE: 61 BPM | SYSTOLIC BLOOD PRESSURE: 182 MMHG | RESPIRATION RATE: 18 BRPM

## 2022-09-01 PROCEDURE — 2500000003 HC RX 250 WO HCPCS: Performed by: STUDENT IN AN ORGANIZED HEALTH CARE EDUCATION/TRAINING PROGRAM

## 2022-09-01 RX ADMIN — LIDOCAINE HYDROCHLORIDE 20 ML: 10; .005 INJECTION, SOLUTION EPIDURAL; INFILTRATION; INTRACAUDAL; PERINEURAL at 00:19

## 2022-09-01 NOTE — ED PROVIDER NOTES
ED Attending Attestation Note     Date of evaluation: 8/31/2022    This patient was seen by the resident. I have seen and examined the patient, agree with the workup, evaluation, management and diagnosis. The care plan has been discussed. My assessment reveals complaints of injuries from a mechanical fall. Patient was in his front yard in the dark and tripped over a stroke causing himself to fall forward onto his driveway. He denies loss of consciousness. Patient does have significant swelling as well as a small laceration to the nose and multiple abrasions but no other evidence of trauma. Will check imaging. I was present for the laceration repair.      Evans Kingsley MD  08/31/22 [id number]

## 2022-09-01 NOTE — ED PROVIDER NOTES
[facility] RESIDENT NOTE       Date of evaluation: 8/31/2022    Chief Complaint     Fall (Mechanical fall/facial injuries/unknown LOC/)      of Present Illness     Blas Moss is a 68 y.o. male who presents to the emergency department after a fall. Patient states that he was in his front yard when he tripped over a shrub and fell forwards onto his face. Patient denies any symptoms prior to the fall. He denies any loss of consciousness. Patient takes baby aspirin but no other blood thinners. He reports he has pain mostly in his head and face but denies pain anywhere else. He does report he had a fall recently about a month ago and has had some discomfort in his chest, he was found to have 1 rib fracture. He denies any shortness of breath. Patient believes he had his tetanus updated at his last PCPs visit. Review of Systems     Review of Systems   Constitutional:  Negative for appetite change, chills and fever. HENT:  Positive for facial swelling and sinus pain. Negative for ear pain and trouble swallowing. Positive for facial pain   Eyes:  Negative for pain. Respiratory:  Negative for cough, chest tightness, shortness of breath and wheezing. Cardiovascular:  Negative for chest pain. Gastrointestinal:  Negative for abdominal pain, blood in stool, constipation, diarrhea, nausea and vomiting. Genitourinary:  Negative for difficulty urinating, dysuria, flank pain and hematuria. Skin:  Negative for rash. Neurological:  Positive for headaches. Negative for light-headedness and numbness.       Past Medical, Surgical, Family, and Social History     He has a past medical history of Anxiety, Arthritis, Arthritis of hand, right, Sneed esophagus, Sneed esophagus, Bilateral carotid artery stenosis <50%, BPH (benign prostatic hypertrophy), CAD (coronary artery disease), Carotid stenosis, Chest pain, Chronic back pain, Chronic back pain, (ARICEPT) 10 MG TABLET    Take 1 tablet by mouth nightly    IMIPRAMINE (TOFRANIL) 25 MG TABLET    TAKE 1 TABLET NIGHTLY    LIDOCAINE (LIDODERM) 5 %    Place 1 patch onto the skin daily 12 hours on, 12 hours off. LINACLOTIDE (LINZESS) 290 MCG CAPS CAPSULE    Take 290 mcg by mouth every morning (before breakfast)    MELATONIN 10 MG TABS    Take by mouth nightly as needed    MODAFINIL (PROVIGIL) 100 MG TABLET    Take  mg by mouth every morning. OXYBUTYNIN (DITROPAN-XL) 10 MG EXTENDED RELEASE TABLET        PANTOPRAZOLE (PROTONIX) 40 MG TABLET      Take 40 mg by mouth daily     POLYETHYLENE GLYCOL 3350 (MIRALAX PO)      Take 17 g by mouth daily as needed     SIMVASTATIN (ZOCOR) 10 MG TABLET    TAKE 1 TABLET EVERY NIGHT       Allergies     He is allergic to levofloxacin. Physical Exam     INITIAL VITALS: BP: (!) 150/79, Temp: 97 °F (36.1 °C), Heart Rate: 65, Resp: 16, SpO2: 97 %   Physical Exam  Vitals and nursing note reviewed. Constitutional:       General: He is not in acute distress. Appearance: Normal appearance. He is not ill-appearing, toxic-appearing or diaphoretic. HENT:      Head: Normocephalic. Comments: No battles sign. No racoon eyes. No midface instability. No septal hematoma. No loose teeth. Large hematoma over right forehead w/ abrasion. Small hemostatic superficial lac over bridge of the nose. Right Ear: Tympanic membrane, ear canal and external ear normal.      Left Ear: Tympanic membrane, ear canal and external ear normal.      Nose: Nose normal.      Mouth/Throat:      Mouth: Mucous membranes are moist.      Pharynx: No oropharyngeal exudate or posterior oropharyngeal erythema. Eyes:      General: No scleral icterus. Right eye: No discharge. Left eye: No discharge. Extraocular Movements: Extraocular movements intact. Conjunctiva/sclera: Conjunctivae normal.      Pupils: Pupils are equal, round, and reactive to light.    Neck:      Comments: No c spine tenderness. Cardiovascular:      Rate and Rhythm: Normal rate and regular rhythm. Pulses: Normal pulses. Heart sounds: Normal heart sounds. No murmur heard. No friction rub. No gallop. Pulmonary:      Effort: Pulmonary effort is normal. No respiratory distress. Breath sounds: Normal breath sounds. No stridor. No wheezing, rhonchi or rales. Chest:      Chest wall: No tenderness. Abdominal:      General: There is no distension. Palpations: Abdomen is soft. There is no mass. Tenderness: There is no abdominal tenderness. There is no guarding or rebound. Hernia: No hernia is present. Comments: No abdominal bruising. Musculoskeletal:         General: No swelling, tenderness, deformity or signs of injury. Normal range of motion. Cervical back: Normal range of motion and neck supple. No rigidity. No muscular tenderness. Right lower leg: No edema. Left lower leg: No edema. Comments: No t or l spine tenderness. No tenderness or deformity over extremities. Able to range joints with no difficulty. Small abrasion over left knee and abrasions throughout RUE. Lymphadenopathy:      Cervical: No cervical adenopathy. Skin:     General: Skin is warm and dry. Capillary Refill: Capillary refill takes less than 2 seconds. Coloration: Skin is not jaundiced. Findings: No bruising, erythema or rash. Neurological:      General: No focal deficit present. Mental Status: He is alert and oriented to person, place, and time. Mental status is at baseline. Cranial Nerves: No cranial nerve deficit. Sensory: No sensory deficit. Motor: No weakness. Comments: GCS 15.   Psychiatric:         Mood and Affect: Mood normal.       RECENT VITALS:  BP: (!) 182/79, Temp: 97 °F (36.1 °C), Heart Rate: 57,Resp: 18, SpO2: 97 %     DiagnosticResults     RADIOLOGY:  CT CERVICAL SPINE WO CONTRAST   Final Result      1.   No acute intracranial process. Frontal and right periorbital scalp hematoma. 2.  Bilateral nasal bone fractures and nasal septum fracture. 3.  No evidence of acute fracture in the cervical spine. CT MAXILLOFACIAL WO CONTRAST   Final Result      1. No acute intracranial process. Frontal and right periorbital scalp hematoma. 2.  Bilateral nasal bone fractures and nasal septum fracture. 3.  No evidence of acute fracture in the cervical spine. CT Head W/O Contrast   Final Result      1. No acute intracranial process. Frontal and right periorbital scalp hematoma. 2.  Bilateral nasal bone fractures and nasal septum fracture. 3.  No evidence of acute fracture in the cervical spine. XR CHEST (2 VW)   Final Result      No acute pulmonary disease. XR KNEE LEFT (3 VIEWS)   Final Result   Impression:   No acute osseous injury. Intact total knee arthroplasty. LABS:   No results found for this visit on 08/31/22.     Procedures     Lac Repair    Date/Time: 9/1/2022 12:17 AM  Performed by: Mehran New MD  Authorized by: Alexandro Russell MD     Consent:     Consent obtained:  Verbal    Consent given by:  Patient    Risks, benefits, and alternatives were discussed: yes      Risks discussed:  Infection and need for additional repair  Universal protocol:     Patient identity confirmed:  Verbally with patient  Anesthesia:     Anesthesia method:  Local infiltration    Local anesthetic:  Lidocaine 2% w/o epi  Laceration details:     Location:  Face    Face location:  Nose    Length (cm):  2  Pre-procedure details:     Preparation:  Patient was prepped and draped in usual sterile fashion  Exploration:     Imaging outcome: foreign body not noted      Contaminated: no    Treatment:     Area cleansed with:  Saline    Amount of cleaning:  Standard    Irrigation solution:  Sterile saline    Irrigation method:  Tap    Visualized foreign bodies/material removed: no      Debridement:  None    Undermining: None  Skin repair:     Repair method:  Sutures    Suture size:  5-0    Wound skin closure material used: fast.    Number of sutures:  2  Approximation:     Approximation:  Close  Repair type:     Repair type:  Simple  Post-procedure details:     Dressing:  Open (no dressing)    Procedure completion:  Tolerated      ED Course     Nursing Notes, Past Medical Hx, Past Surgical Hx, Social Hx, Allergies, and Family Hx were reviewed. The patient was given the followingmedications:  Orders Placed This Encounter   Medications    Tetanus-Diphth-Acell Pertussis (BOOSTRIX) injection 0.5 mL    acetaminophen (TYLENOL) tablet 650 mg    lidocaine-EPINEPHrine 2 percent-1:205535 injection 20 mL       CONSULTS:  None    MEDICAL DECISION MAKING / ASSESSMENT / Michael Jostin is a 68 y.o. male presenting to the emergency department after mechanical fall when he tripped and fell forwards. Patient has evidence of facial trauma, and diffuse abrasions throughout but no other findings on exam.  He is a GCS of 15 with no neurologic deficits and is on a blood thinners. CT head C-spine and x-rays obtained and only notable for bilateral nasal bone fractures and nasal septum fracture. There is no septal hematoma. I suspect this is a closed injury, the overlying laceration is very superficial.  This was repaired as noted above. Radiographs otherwise reassuring. Patient was able to tolerate p.o., ambulate without difficulty and had no new areas of pain. He was referred to ENT for follow-up if desired and instructed to follow-up with his primary care doctor in the next week to discuss his symptoms. This patient was also evaluated by the attending physician. All care plans werediscussed and agreed upon. Clinical Impression     1. Injury of head, initial encounter    2.  Closed fracture of nasal bone, initial encounter        Disposition     PATIENT REFERRED TO:  Aleksandra Griggs MD  [address] [facility] [address] [facility] [address]  [phone]    Schedule an appointment as soon as possible for a visit       DISCHARGE MEDICATIONS:  New Prescriptions    No medications on file       DISPOSITION Decision To Discharge 08/31/2022 11:34:11 PM        Fatimah Kamara MD  Resident  09/01/22 4444

## 2022-09-01 NOTE — ED NOTES
Pt discharged from ED in stable condition. Discharge instruction explain, all question answered. Pt walked to lobby independently.        Ildefonso Fisher RN  09/01/22 0163

## 2022-09-01 NOTE — DISCHARGE INSTRUCTIONS
You are seen in the emergency department after a fall. Your CT scans showed that you have nose fractures, but no other injury. We repaired the small cut over the bridge of your nose with absorbable sutures. These do not need to be removed. Do not scrub or get the sutures wet for 24 hours. After that you can wash gently with water and warm soap. The phone number for the ear nose and throat team is above. Call them for a follow-up appointment they will want to see once the swelling has subsided. You can take tylenol for pain at home.

## 2022-09-06 ENCOUNTER — OFFICE VISIT (OUTPATIENT)
Dept: ENT CLINIC | Age: 77
End: 2022-09-06
Payer: MEDICARE

## 2022-09-06 VITALS
HEIGHT: 68 IN | HEART RATE: 59 BPM | SYSTOLIC BLOOD PRESSURE: 169 MMHG | BODY MASS INDEX: 22.88 KG/M2 | WEIGHT: 151 LBS | DIASTOLIC BLOOD PRESSURE: 90 MMHG

## 2022-09-06 DIAGNOSIS — J34.89 NASAL OBSTRUCTION: ICD-10-CM

## 2022-09-06 DIAGNOSIS — S02.2XXB OPEN FRACTURE OF NASAL BONE, INITIAL ENCOUNTER: Primary | ICD-10-CM

## 2022-09-06 DIAGNOSIS — J34.2 DNS (DEVIATED NASAL SEPTUM): ICD-10-CM

## 2022-09-06 DIAGNOSIS — M95.0 ACQUIRED NASAL DEFORMITY: ICD-10-CM

## 2022-09-06 PROCEDURE — 99204 OFFICE O/P NEW MOD 45 MIN: CPT | Performed by: OTOLARYNGOLOGY

## 2022-09-06 PROCEDURE — 1123F ACP DISCUSS/DSCN MKR DOCD: CPT | Performed by: OTOLARYNGOLOGY

## 2022-09-06 PROCEDURE — G8427 DOCREV CUR MEDS BY ELIG CLIN: HCPCS | Performed by: OTOLARYNGOLOGY

## 2022-09-06 PROCEDURE — 1036F TOBACCO NON-USER: CPT | Performed by: OTOLARYNGOLOGY

## 2022-09-06 PROCEDURE — G8420 CALC BMI NORM PARAMETERS: HCPCS | Performed by: OTOLARYNGOLOGY

## 2022-09-06 ASSESSMENT — ENCOUNTER SYMPTOMS
DIARRHEA: 0
SORE THROAT: 0
FACIAL SWELLING: 1
BLOOD IN STOOL: 0
WHEEZING: 0
SINUS PRESSURE: 0
EYE ITCHING: 0
NAUSEA: 0
VOMITING: 0
PHOTOPHOBIA: 0
STRIDOR: 0
BACK PAIN: 0
SHORTNESS OF BREATH: 0
CHOKING: 0
VOICE CHANGE: 0
RHINORRHEA: 0
COUGH: 0
EYE DISCHARGE: 0
TROUBLE SWALLOWING: 0
COLOR CHANGE: 0
SINUS PAIN: 0
CONSTIPATION: 0

## 2022-09-06 NOTE — LETTER
[facility]    Surgery Schedule Request Form: 09/06/22  [address] [place]      DATE OF SURGERY: 09/12/2022  TIME OF SURGERY:  3:15 pm            CONF #: ____________________       Patient Information:    Patient name: Thong Sutherland    YOB: 1945 Age/Sex:77 y.o./male    SS #:[id number]    Wt Readings from Last 1 Encounters:   09/06/22 151 lb (68.5 kg)       Telephone Information:   Mobile [phone]     Home [phone]     Surgeon & Procedure Information:     Lead surgeon: Tito Galdamez Co-Surgeon: nabor  Phone: [phone] Fax: [phone]  PCP: Caroline Franklin MD    Diagnosis: 1. Open fracture of nasal bone (S02. XXB)  2.  Nasal obstruction (J34.89)  3. Deviated nasal septum (J34.2)  4. Acquired nasal deformity (M95.0)    Procedure name/CPT: Closed Reduction Nasal Fracture (66987) and Septoplasty (24760)    Procedure length: 30 minutes Anesthesia: General    Special Equipment: no    Patient Status: SDS (OP)    Primary Payor Plan: [id number] Medicare  Member ID: [id number]   [facility] [address] name: [address] [place] [address],[address] Vaccinated? No    [] Implement attached clinical orders for patient.       Electronically signed by Maureen Hylton DO on 9/6/2022 at 2:36 PM

## 2022-09-06 NOTE — PROGRESS NOTES
1/17/2012    colonoscopy    Elevated PSA     8/21/14;5/2013:under care of Dr. Beltran/[place]:[facility]rology group    Erectile dysfunction 6/13/2017    Essential hypertension, benign 6/20/2012    cardiologist:Dr. Carrie Morales    GERD (gastroesophageal reflux disease)     Hemorrhoid     Hiatal hernia     small    Hyperlipidemia     Impaired fasting glucose 5/19/2014    Osteopenia per CXR 5/19/2014    Parkinson disease ([address]     Dr. Samina Ortiz Neurology    Renal stone 4/2012    4 mm distal left ureteral calculus:Dr. Beltran:urologist    RLS (restless legs syndrome)     S/P colonoscopy 2011;5/20/19    Dr. Kaiden Grey per pt' next is in 10yrs-2021. 5/20/19(Dr. Clifton Sierra)    S/P endoscopy 1/2012    Dr. Walters Phlegm acute changes:+Barrets:next in 3yrs 1/2015    Sigmoidoscopy exam 1/17/2012    Dr. Lama:No acute changes.  Next in 5yrs=1/2017    Therapeutic drug monitoring 5/14/15    Consistent OARRS report on 5/14/15;8/4/15    Thoracic spondylosis     under care of ortho spine:Dr. Chivo Fraser    Venous insufficiency of leg     Vocal cord paresis 5/11/2016    under care of ENT:Dr. George Noland. s/p vocal cord augmentation 6/2016 at [address]       Past Surgical History     Past Surgical History:   Procedure Laterality Date    CATARACT REMOVAL      COLONOSCOPY  2002;2011    COLONOSCOPY  8/13/2021    COLONOSCOPY DIAGNOSTIC performed by Micky De Jesus MD at [facility] [address] Right 02/23/2017    Right TKR(knee)    KNEE ARTHROPLASTY Left 7/30/13    LEFT TOTAL KNEE ARTHROPLASTY              KNEE CARTILAGE SURGERY      Left x 2, right x1    LITHOTRIPSY      Kidney stone removal as per urology:Stent removed after 10days    OSTEOTOMY Left     TURP N/A [id number]    TRANSURETHRAL RESECTION OF PROSTATE    UPPER GASTROINTESTINAL ENDOSCOPY  5/07       Family History     Family History   Problem Relation Age of Onset    Cancer Sister 68        breast     Heart Disease Sister     Kidney Disease Father 61    Alcohol Abuse Father     Coronary Art Dis Mother 80    Coronary Art Dis Brother 77       Social History     Social History     Socioeconomic History    Marital status:      Spouse name: Not on file    Number of children: 2    Years of education: Not on file    Highest education level: Not on file   Occupational History    Occupation: retired    Tobacco Use    Smoking status: Never    Smokeless tobacco: Never   Vaping Use    Vaping Use: Former   Substance and Sexual Activity    Alcohol use:  Yes     Alcohol/week: 0.0 standard drinks     Comment: occasionally    Drug use: No    Sexual activity: Yes     Partners: Female   Other Topics Concern    Not on file   Social History Narrative    Not on file     Social Determinants of Health     Financial Resource Strain: Low Risk     Difficulty of Paying Living Expenses: Not hard at all   Food Insecurity: No Food Insecurity    Worried About Running Out of Food in the Last Year: Never true    Ran Out of Food in the Last Year: Never true   Transportation Needs: Not on file   Physical Activity: Not on file   Stress: Not on file   Social Connections: Not on file   Intimate Partner Violence: Not on file   Housing Stability: Not on file       Allergies     Allergies   Allergen Reactions    Levofloxacin Swelling     lips swell       Medications     Current Outpatient Medications   Medication Sig Dispense Refill    oxybutynin (DITROPAN-XL) 10 MG extended release tablet       donepezil (ARICEPT) 10 MG tablet Take 1 tablet by mouth nightly 30 tablet 5    acetaminophen (TYLENOL) 325 MG tablet Take 1 tablet by mouth every 6 hours as needed for Pain 60 tablet 0    simvastatin (ZOCOR) 10 MG tablet TAKE 1 TABLET EVERY NIGHT 90 tablet 3    imipramine (TOFRANIL) 25 MG tablet TAKE 1 TABLET NIGHTLY 90 tablet 0    Melatonin 10 MG TABS Take by mouth nightly as needed      Carbidopa-Levodopa ER 48. MG CPCR Take 1 tablet by mouth 4 times daily 0800, 1200, 1600, 2000       lidocaine (LIDODERM) 5 % Place 1 patch onto the skin daily 12 hours on, 12 hours off. (Patient taking differently: Place 1 patch onto the skin as needed 12 hours on, 12 hours off.) 30 patch 0    modafinil (PROVIGIL) 100 MG tablet Take  mg by mouth every morning. linaclotide (LINZESS) 290 MCG CAPS capsule Take 290 mcg by mouth every morning (before breakfast)      clonazePAM (KLONOPIN) 0.5 MG tablet Take 1 mg by mouth nightly       carbidopa-levodopa (SINEMET)  MG per tablet Take 2 tablets by mouth 4 times daily 0800, 1200, 1600 and 2000. (with Rytary)      Cholecalciferol (VITAMIN D3) 2000 UNITS CAPS   Take 1 capsule by mouth daily       aspirin 81 MG EC tablet   Take 81 mg by mouth three times a week On Mondays, Wednesdays and Fridays      Polyethylene Glycol 3350 (MIRALAX PO)   Take 17 g by mouth daily as needed       pantoprazole (PROTONIX) 40 MG tablet   Take 40 mg by mouth daily        No current facility-administered medications for this visit. Review of Systems     Review of Systems   Constitutional:  Negative for activity change, appetite change, chills, diaphoresis, fatigue, fever and unexpected weight change. HENT:  Positive for congestion and facial swelling. Negative for dental problem, drooling, ear discharge, ear pain, hearing loss, mouth sores, nosebleeds, postnasal drip, rhinorrhea, sinus pressure, sinus pain, sneezing, sore throat, tinnitus, trouble swallowing and voice change. Eyes:  Negative for photophobia, discharge, itching and visual disturbance. Respiratory:  Negative for cough, choking, shortness of breath, wheezing and stridor. Gastrointestinal:  Negative for blood in stool, constipation, diarrhea, nausea and vomiting. Endocrine: Negative for cold intolerance, heat intolerance, polyphagia and polyuria. Musculoskeletal:  Negative for back pain, gait problem, neck pain and neck stiffness. Skin:  Negative for color change, pallor, rash and wound.    Neurological:  Negative for dizziness, syncope, facial asymmetry, speech difficulty, light-headedness, numbness and headaches. Hematological:  Negative for adenopathy. Does not bruise/bleed easily. Psychiatric/Behavioral:  Negative for agitation, confusion and sleep disturbance. PhysicalExam     Vitals:    09/06/22 1417   BP: (!) 169/90   Pulse: 59       Physical Exam  Constitutional:       Appearance: He is well-developed. HENT:      Head: Normocephalic. Not macrocephalic and not microcephalic. Raccoon eyes and contusion present. No Rosales's sign, abrasion, right periorbital erythema, left periorbital erythema or laceration. Hair is normal.      Jaw: No trismus. Right Ear: Hearing, tympanic membrane and external ear normal. No decreased hearing noted. No drainage, swelling or tenderness. No middle ear effusion. No mastoid tenderness. Tympanic membrane is not perforated, retracted or bulging. Tympanic membrane has normal mobility. Left Ear: Hearing, tympanic membrane and external ear normal. No decreased hearing noted. No drainage, swelling or tenderness. No middle ear effusion. No mastoid tenderness. Tympanic membrane is not perforated, retracted or bulging. Tympanic membrane has normal mobility. Nose: Nasal deformity and septal deviation present. No laceration, mucosal edema or rhinorrhea. Right Sinus: No maxillary sinus tenderness or frontal sinus tenderness. Left Sinus: No maxillary sinus tenderness or frontal sinus tenderness. Mouth/Throat:      Mouth: Mucous membranes are not pale, not dry and not cyanotic. No lacerations or oral lesions. Dentition: Normal dentition. No dental caries or dental abscesses. Pharynx: Uvula midline. No oropharyngeal exudate, posterior oropharyngeal erythema or uvula swelling. Tonsils: No tonsillar abscesses. Eyes:      General: Lids are normal.         Right eye: No discharge. Left eye: No discharge.       Extraocular Movements:      Right eye: Normal extraocular motion and no nystagmus. Left eye: Normal extraocular motion and no nystagmus. Conjunctiva/sclera:      Right eye: No chemosis or exudate. Left eye: No chemosis or exudate. Neck:      Thyroid: No thyroid mass or thyromegaly. Vascular: Normal carotid pulses. Trachea: No tracheal tenderness or tracheal deviation. Cardiovascular:      Rate and Rhythm: Normal rate and regular rhythm. Pulmonary:      Effort: No tachypnea, bradypnea or respiratory distress. Breath sounds: No stridor. Musculoskeletal:      Right shoulder: Normal range of motion. Cervical back: Neck supple. Lymphadenopathy:      Head:      Right side of head: No submental, submandibular, tonsillar, preauricular, posterior auricular or occipital adenopathy. Left side of head: No submental, submandibular, tonsillar, preauricular, posterior auricular or occipital adenopathy. Cervical: No cervical adenopathy. Right cervical: No superficial, deep or posterior cervical adenopathy. Left cervical: No superficial, deep or posterior cervical adenopathy. Skin:     General: Skin is warm and dry. Findings: No bruising, erythema, laceration, lesion or rash. Neurological:      Mental Status: He is alert and oriented to person, place, and time. Cranial Nerves: No cranial nerve deficit. Psychiatric:         Speech: Speech normal.         Behavior: Behavior normal.         Procedure           Assessment and Plan     1. Open fracture of nasal bone, initial encounter  Patient unfortunately suffered a recent mechanical fall with nasal fracture, septal fracture and septal deviation resulting in nasal obstruction and significant deformity. I recommend close reduction of the nasal fracture as well as a septoplasty. Risk, benefits and alternatives discussed.   Risks include, but are not limited to bleeding, infection, pain, septal perforation, septal hematoma, poor cosmetic outcome, need for revision surgery. Patient understands and like to proceed. 2. Nasal obstruction      3. DNS (deviated nasal septum)      4. Acquired nasal deformity      Return for 1 week post op. Portions of this note were dictated using Dragon.  There may be linguistic errors secondary to the use of this program.

## 2022-09-08 ENCOUNTER — TELEPHONE (OUTPATIENT)
Dept: FAMILY MEDICINE CLINIC | Age: 77
End: 2022-09-08

## 2022-09-08 ENCOUNTER — OFFICE VISIT (OUTPATIENT)
Dept: FAMILY MEDICINE CLINIC | Age: 77
End: 2022-09-08
Payer: MEDICARE

## 2022-09-08 ENCOUNTER — TELEPHONE (OUTPATIENT)
Dept: ENT CLINIC | Age: 77
End: 2022-09-08

## 2022-09-08 VITALS
RESPIRATION RATE: 10 BRPM | HEART RATE: 60 BPM | BODY MASS INDEX: 22.97 KG/M2 | WEIGHT: 151.6 LBS | TEMPERATURE: 97.3 F | DIASTOLIC BLOOD PRESSURE: 70 MMHG | SYSTOLIC BLOOD PRESSURE: 120 MMHG | HEIGHT: 68 IN | OXYGEN SATURATION: 95 %

## 2022-09-08 DIAGNOSIS — Z11.59 NEED FOR HEPATITIS C SCREENING TEST: ICD-10-CM

## 2022-09-08 DIAGNOSIS — R00.1 SINUS BRADYCARDIA: ICD-10-CM

## 2022-09-08 DIAGNOSIS — I65.23 BILATERAL CAROTID ARTERY STENOSIS: ICD-10-CM

## 2022-09-08 DIAGNOSIS — I10 PRIMARY HYPERTENSION: ICD-10-CM

## 2022-09-08 DIAGNOSIS — K21.9 GASTROESOPHAGEAL REFLUX DISEASE WITHOUT ESOPHAGITIS: ICD-10-CM

## 2022-09-08 DIAGNOSIS — G20 PARKINSON DISEASE (HCC): ICD-10-CM

## 2022-09-08 DIAGNOSIS — E78.2 HYPERLIPIDEMIA, MIXED: ICD-10-CM

## 2022-09-08 DIAGNOSIS — K22.70 BARRETT'S ESOPHAGUS WITHOUT DYSPLASIA: ICD-10-CM

## 2022-09-08 DIAGNOSIS — N17.9 AKI (ACUTE KIDNEY INJURY) (HCC): ICD-10-CM

## 2022-09-08 DIAGNOSIS — Z01.818 PREOP EXAMINATION: Primary | ICD-10-CM

## 2022-09-08 DIAGNOSIS — K59.00 CONSTIPATION, UNSPECIFIED CONSTIPATION TYPE: ICD-10-CM

## 2022-09-08 DIAGNOSIS — S02.2XXA CLOSED FRACTURE OF NASAL BONE, INITIAL ENCOUNTER: ICD-10-CM

## 2022-09-08 DIAGNOSIS — M50.30 DEGENERATIVE CERVICAL DISC: ICD-10-CM

## 2022-09-08 LAB
A/G RATIO: 1.7 (ref 1.1–2.2)
ALBUMIN SERPL-MCNC: 4.4 G/DL (ref 3.4–5)
ALBUMIN SERPL-MCNC: 4.7 G/DL (ref 3.4–5)
ALP BLD-CCNC: 84 U/L (ref 40–129)
ALT SERPL-CCNC: <5 U/L (ref 10–40)
ANION GAP SERPL CALCULATED.3IONS-SCNC: 11 MMOL/L (ref 3–16)
ANION GAP SERPL CALCULATED.3IONS-SCNC: 13 MMOL/L (ref 3–16)
AST SERPL-CCNC: 14 U/L (ref 15–37)
BILIRUB SERPL-MCNC: 0.5 MG/DL (ref 0–1)
BUN BLDV-MCNC: 16 MG/DL (ref 7–20)
BUN BLDV-MCNC: 17 MG/DL (ref 7–20)
CALCIUM SERPL-MCNC: 9.2 MG/DL (ref 8.3–10.6)
CALCIUM SERPL-MCNC: 9.4 MG/DL (ref 8.3–10.6)
CHLORIDE BLD-SCNC: 100 MMOL/L (ref 99–110)
CHLORIDE BLD-SCNC: 103 MMOL/L (ref 99–110)
CHOLESTEROL, TOTAL: 139 MG/DL (ref 0–199)
CO2: 26 MMOL/L (ref 21–32)
CO2: 27 MMOL/L (ref 21–32)
CREAT SERPL-MCNC: 0.8 MG/DL (ref 0.8–1.3)
CREAT SERPL-MCNC: 1 MG/DL (ref 0.8–1.3)
CREATININE URINE: 166.3 MG/DL (ref 39–259)
GFR AFRICAN AMERICAN: >60
GFR AFRICAN AMERICAN: >60
GFR NON-AFRICAN AMERICAN: >60
GFR NON-AFRICAN AMERICAN: >60
GLUCOSE BLD-MCNC: 97 MG/DL (ref 70–99)
GLUCOSE BLD-MCNC: 99 MG/DL (ref 70–99)
HCT VFR BLD CALC: 40.1 % (ref 40.5–52.5)
HDLC SERPL-MCNC: 59 MG/DL (ref 40–60)
HEMOGLOBIN: 13.3 G/DL (ref 13.5–17.5)
HEPATITIS C ANTIBODY INTERPRETATION: NORMAL
LDL CHOLESTEROL CALCULATED: 68 MG/DL
MCH RBC QN AUTO: 32.2 PG (ref 26–34)
MCHC RBC AUTO-ENTMCNC: 33.1 G/DL (ref 31–36)
MCV RBC AUTO: 97.3 FL (ref 80–100)
MICROALBUMIN UR-MCNC: <1.2 MG/DL
MICROALBUMIN/CREAT UR-RTO: NORMAL MG/G (ref 0–30)
PDW BLD-RTO: 14.5 % (ref 12.4–15.4)
PHOSPHORUS: 3.2 MG/DL (ref 2.5–4.9)
PLATELET # BLD: 210 K/UL (ref 135–450)
PMV BLD AUTO: 8.6 FL (ref 5–10.5)
POTASSIUM SERPL-SCNC: 4 MMOL/L (ref 3.5–5.1)
POTASSIUM SERPL-SCNC: 4.1 MMOL/L (ref 3.5–5.1)
RBC # BLD: 4.12 M/UL (ref 4.2–5.9)
SODIUM BLD-SCNC: 139 MMOL/L (ref 136–145)
SODIUM BLD-SCNC: 141 MMOL/L (ref 136–145)
TOTAL PROTEIN: 7 G/DL (ref 6.4–8.2)
TRIGL SERPL-MCNC: 60 MG/DL (ref 0–150)
TSH SERPL DL<=0.05 MIU/L-ACNC: 3.41 UIU/ML (ref 0.27–4.2)
VLDLC SERPL CALC-MCNC: 12 MG/DL
WBC # BLD: 7.4 K/UL (ref 4–11)

## 2022-09-08 PROCEDURE — 93000 ELECTROCARDIOGRAM COMPLETE: CPT | Performed by: FAMILY MEDICINE

## 2022-09-08 PROCEDURE — G8420 CALC BMI NORM PARAMETERS: HCPCS | Performed by: FAMILY MEDICINE

## 2022-09-08 PROCEDURE — 99213 OFFICE O/P EST LOW 20 MIN: CPT | Performed by: FAMILY MEDICINE

## 2022-09-08 PROCEDURE — G8427 DOCREV CUR MEDS BY ELIG CLIN: HCPCS | Performed by: FAMILY MEDICINE

## 2022-09-08 RX ORDER — AMANTADINE 137 MG/1
CAPSULE, COATED PELLETS ORAL
Qty: 90 CAPSULE | Refills: 1
Start: 2022-09-08

## 2022-09-08 NOTE — TELEPHONE ENCOUNTER
----- Message from Francesco Haddad sent at 9/8/2022  1:48 PM EDT -----  Subject: Message to Provider    QUESTIONS  Information for Provider? Pt has been taking turmeric for a year and   forgot to mention this during his pre-op appt today at 9:30am with Dr. Anup Wang. Pt has nose surgery on 9/12 at [facility] by Dr. Lennox Silver and   wants to make sure this will not be an issue for surgery.  Please call Pt   back and advise.   ---------------------------------------------------------------------------  --------------  Zurdo Borges [facility]  [phone]; OK to leave message on voicemail  ---------------------------------------------------------------------------  --------------  SCRIPT ANSWERS  undefined

## 2022-09-08 NOTE — PROGRESS NOTES
H&P done 9/8/2022 with Dr.Mary Saldana in epic NEEDS SIGNED.  -DB    Labs ordered by PCP drawn 9/8/2022 [facility] outpatient lab states wife results pending. -db

## 2022-09-08 NOTE — PROGRESS NOTES
Chief Complaint:     Gretta Muse is a 68 y.o. male who presents for a preoperative physical examination. He is scheduled to have Closed reduction nasal fracture /septoplasty done by Dr. Yuan Jernigan at [facility], [facility]. on 9/12/22. History of Present Illness:      Patient suffered a nasal fracture after going outside in the dark at 9 pm and tripping on ground cover vine and hit cement driveway . He may have had loss of consciousness at the time on 8/31/22. The neighbor's dog was barking which is why he initially went out , but the neighbor found him. He is having problems breathing out of left nostril , which was a problem before but has worsened. Patient denies chest pain, palpitations, or shortness of breath . He has follow up appointment with Dr. Gayle Lawson on 9/29/22. No new medications, except Gocovri started and Sinemet increased slightly by 1/2 pill per day. No personal or family history of bleeding, clotting, or anesthesia problems. Past Medical History:   Diagnosis Date    Anxiety     PCP in [place]:Dr. Maribel Ernst.    Arthritis     Arthritis of hand, right 6/20/2012    Sneed esophagus     Dr. Susannah Veronica. EGD:1/17/2012. Next EGD due in 3yrs=1/2015    Sneed esophagus     Under care of GI    Bilateral carotid artery stenosis <50% 4/30/2014    BPH (benign prostatic hypertrophy)     Dr. Massimo Melo. PSA per urology.     CAD (coronary artery disease)     under care of cards:Dr. Gayle Lawson    Carotid stenosis     Chest pain     pt states he no chest pain in 5 yrs, panic attack    Chronic back pain 1/26/2012    Chronic back pain     under care of ortho spine:Dr. Sweeney    Constipation 8/6/2013    Degenerative arthritis of thoracic spine 1/26/2012    Degenerative cervical disc 1/26/2012    Depression with anxiety 11/15/2011    Klonopin per neurologist(Dr. Brandin Duarte)    Diverticulosis 1/17/2012    colonoscopy    Elevated PSA     8/21/14;5/2013:under care of Dr. Beltran/[place]:[facility] group    Erectile dysfunction 6/13/2017    Essential hypertension, benign 6/20/2012    cardiologist:Dr. Maki Roland    GERD (gastroesophageal reflux disease)     Hemorrhoid     Hiatal hernia     small    Hyperlipidemia     Impaired fasting glucose 5/19/2014    Osteopenia per CXR 5/19/2014    Parkinson disease ([address]     Dr. Fabi Reyes Neurology    Renal stone 4/2012    4 mm distal left ureteral calculus:Dr. Beltran:urologist    RLS (restless legs syndrome)     S/P colonoscopy 2011;5/20/19    Dr. Syed Bautista per pt' next is in 10yrs-2021. 5/20/19(Dr. Marck Messer)    S/P endoscopy 1/2012    Dr. Miki Yadav acute changes:+Barrets:next in 3yrs 1/2015    Sigmoidoscopy exam 1/17/2012    Dr. Lama:No acute changes.  Next in 5yrs=1/2017    Therapeutic drug monitoring 5/14/15    Consistent OARRS report on 5/14/15;8/4/15    Thoracic spondylosis     under care of ortho spine:Dr. Darron Magana    Venous insufficiency of leg     Vocal cord paresis 5/11/2016    under care of ENT:Dr. Taqueria Dang. s/p vocal cord augmentation 6/2016 at [facility] of patient's past surgical history indicates:     Past Surgical History:   Procedure Laterality Date    CATARACT REMOVAL      COLONOSCOPY  2002;2011    COLONOSCOPY  8/13/2021    COLONOSCOPY DIAGNOSTIC performed by Juan J Arreola MD at [address] Right 02/23/2017    Right TKR(knee)    KNEE ARTHROPLASTY Left 7/30/13    LEFT TOTAL KNEE ARTHROPLASTY              KNEE CARTILAGE SURGERY      Left x 2, right x1    LITHOTRIPSY      Kidney stone removal as per urology:Stent removed after 10days    OSTEOTOMY Left     TURP N/A [id number]    TRANSURETHRAL RESECTION OF PROSTATE    UPPER GASTROINTESTINAL ENDOSCOPY  5/07                                                   Current Outpatient Medications   Medication Sig Dispense Refill    oxybutynin (DITROPAN-XL) 10 MG extended release tablet       donepezil (ARICEPT) 10 MG tablet Take 1 tablet by mouth nightly 30 tablet 5    acetaminophen (TYLENOL) 325 MG tablet Take 1 tablet by mouth every 6 hours as needed for Pain 60 tablet 0    simvastatin (ZOCOR) 10 MG tablet TAKE 1 TABLET EVERY NIGHT 90 tablet 3    imipramine (TOFRANIL) 25 MG tablet TAKE 1 TABLET NIGHTLY 90 tablet 0    Melatonin 10 MG TABS Take by mouth nightly as needed      Carbidopa-Levodopa ER 48. MG CPCR Take 1 tablet by mouth 4 times daily 0800, 1200, 1600, 2000       lidocaine (LIDODERM) 5 % Place 1 patch onto the skin daily 12 hours on, 12 hours off. (Patient taking differently: Place 1 patch onto the skin as needed 12 hours on, 12 hours off.) 30 patch 0    linaclotide (LINZESS) 290 MCG CAPS capsule Take 290 mcg by mouth every morning (before breakfast)      clonazePAM (KLONOPIN) 0.5 MG tablet Take 1 mg by mouth nightly       carbidopa-levodopa (SINEMET)  MG per tablet Take 2.5 tablets by mouth 5 times daily 0800, 1200, 1600 and 2000. (with Rytary)      Cholecalciferol (VITAMIN D3) 2000 UNITS CAPS   Take 1 capsule by mouth daily       aspirin 81 MG EC tablet   Take 81 mg by mouth three times a week On Mondays, Wednesdays and Fridays      Polyethylene Glycol 3350 (MIRALAX PO)   Take 17 g by mouth daily as needed       pantoprazole (PROTONIX) 40 MG tablet   Take 40 mg by mouth daily        No current facility-administered medications for this visit. Allergies   Allergen Reactions    Levofloxacin Swelling     lips swell       Social History     Tobacco Use    Smoking status: Never    Smokeless tobacco: Never   Vaping Use    Vaping Use: Former   Substance Use Topics    Alcohol use:  Yes     Alcohol/week: 0.0 standard drinks     Comment: occasionally    Drug use: No        Family History   Problem Relation Age of Onset    Cancer Sister 68        breast     Heart Disease Sister     Kidney Disease Father 61    Alcohol Abuse Father     Coronary Art Dis Mother 80    Coronary Art Dis Brother 77        Review Of Systems    Skin: no abnormal pigmentation, rash, scaling, itching, masses, hair or nail changes  Eyes: negative  Ears/Nose/Throat: negative  Respiratory: negative  Cardiovascular: negative  Gastrointestinal: negative  Genitourinary: negative  Musculoskeletal: negative  Neurologic: negative  Psychiatric: negative  Hematologic/Lymphatic/Immunologic: negative  Endocrine: negative       Objective:      /70   Pulse 60   Temp 97.3 °F (36.3 °C)   Resp 10   Ht 5' 8\" (1.727 m)   Wt 151 lb 9.6 oz (68.8 kg)   SpO2 95%   BMI 23.05 kg/m²   General appearance - healthy, alert, no distress  Skin - Skin color, texture, turgor normal. No rashes or lesions. Head - Normocephalic. No masses, lesions, tenderness or abnormalities  Eyes - conjunctivae/corneas clear. PERRLA, EOM's intact. Ears - External ears normal. Canals clear. TM's normal.  Nose/Sinuses - Nares normal. Septum midline. Mucosa normal. No drainage or sinus tenderness. Oropharynx - Lips, mucosa, and tongue normal. Teeth and gums normal. Oropharynx  clear  Neck - Neck supple. No adenopathy. Thyroid symmetric, normal size,  Back - Back symmetric, no curvature. ROM normal. No CVA tenderness. Lungs - Percussion normal. Good diaphragmatic excursion. Lungs clear  Heart - Regular rate and rhythm, with no rub, murmur or gallop noted. No edema. Abdomen - Abdomen soft, non-tender. BS normal. No masses, organomegaly  Extremities - Extremities normal. No deformities, edema, or skin discoloration. Musculoskeletal - Spine ROM normal. Muscular strength intact. Peripheral pulses - radial=4/4,, femoral=4/4, popliteal=4/4, dorsalis pedis=4/4,  Neuro - Gait normal. Reflexes normal and symmetric. Sensation grossly normal.  No focal weakness    EKG: sinus bradycardia, Normal EKG, normal sinus rhythm.         Assessment:        Per encounter diagnoses   Avoid Aspirin, non steroidal anti inflammatory medications, including Motrin, Aleve, Ibuprofen, Advil; multi vitamins, Vitamin E, omega 3 fish oil, and glucosamine chondroitin for the 7 days prior to surgery. 1. Preop examination  - Medical clearance pending cardiac clearance. -  EKG 12 lead- sinus bradycardia. 2. Closed fracture of nasal bone, initial encounter  - Scheduled for surgery . 3. Primary hypertension  - Controlled. Follow low sodium diet. 4. Sinus bradycardia  - EKG sent to cardiologist, Dr. Concetta Donis, for review. 5. Parkinson disease ([facility] [address]  - Followed by Neurologist.    - Continue Sinemet and Gocovri . - Amantadine HCl ER (GOCOVRI) 137 MG CP24; 1 po qhs  Dispense: 90 capsule; Refill: 1    6. Hyperlipidemia, mixed  - Controlled. Continue Simvastatin 10 mg qd and low cholesterol diet. 7. Bilateral carotid artery stenosis <50%  - Stable. Hold Aspirin until after surgery. - Continue Simvastatin 10 mg qd.     8. Gastroesophageal reflux disease without esophagitis/ 9. 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 Continue Protonix 40 mg qd and dietary/ lifestyle modifications. 10. Degenerative cervical disc  - Stable. Moist heat . ROM exercises. Modify activities. 11. Constipation, unspecified constipation type  - Stable. Continue Miralax qd . - Push fluids - water and daily dietary fiber. Plan:          Per operating surgeon. See also orders filed with this encounter, if any. Follow up 3 months/ prn.

## 2022-09-08 NOTE — PROGRESS NOTES
Place patient label inside box (if no patient label, complete below)  Name:  :  MR#:   Soheila Abraham / [address]  I (we)Sumit (Patient Name) authorize Pat Matthew DO (Provider / Kaelyn Muse) and/or such assistants as may be selected by him/her, to perform the following operation/procedure(s): CLOSED REDUCTION NASAL FRACTURE AND SEPTOPLASTY       Note: If unable to obtain consent prior to an emergent procedure, document the emergent reason in the medical record. This procedure has been explained to my (our) satisfaction and included in the explanation was: The intended benefit, nature, and extent of the procedure to be performed; The significant risks involved and the probability of success; Alternative procedures and methods of treatment; The dangers and probable consequences of such alternatives (including no procedure or treatment); The expected consequences of the procedure on my future health; Whether other qualified individuals would be performing important surgical tasks and/or whether  would be present to advise or support the procedure. I (we) understand that there are other risks of infection and other serious complications in the pre-operative/procedural and postoperative/procedural stages of my (our) care. I (we) have asked all of the questions which I (we) thought were important in deciding whether or not to undergo treatment or diagnosis. These questions have been answered to my (our) satisfaction. I (we) understand that no assurance can be given that the procedure will be a success, and no guarantee or warranty of success has been given to me (us). It has been explained to me (us) that during the course of the operation/procedure, unforeseen conditions may be revealed that necessitate extension of the original procedure(s) or different procedure(s) than those set forth in Paragraph 1.  I (we) authorize and request that the above-named physician, his/her assistants or his/her designees, perform procedures as necessary and desirable if deemed to be in my (our) best interest.     Revised 8/2/2021                                                                          Page 1 of 2       I acknowledge that health care personnel may be observing this procedure for the purpose of medical education or other specified purposes as may be necessary as requested and/or approved by my (our) physician. I (we) consent to the disposal by the hospital Pathologist of the removed tissue, parts or organs in accordance with hospital policy. I do ____ do not ____ consent to the use of a local infiltration pain blocking agent that will be used by my provider/surgical provider to help alleviate pain during my procedure. I do ____ do not ____ consent to an emergent blood transfusion in the case of a life-threatening situation that requires blood components to be administered. This consent is valid for 24 hours from the beginning of the procedure. This patient does ____ or does not ____ currently have a DNR status/order. If DNR order is in place, obtain Addendum to the Surgical Consent for ALL Patients with a DNR Order to address luigi-operative status for limited intervention or DNR suspension.      I have read and fully understand the above Consent for Operation/Procedure and that all blanks were completed before I signed the consent.   _____________________________       _____________________      ____/____am/pm  Signature of Patient or legal representative      Printed Name / Relationship            Date / Time   ____________________________       _____________________      ____/____am/pm  Witness to Signature                                    Printed Name                    Date / Time    If patient is unable to sign or is a minor, complete the following)  Patient is a minor, ____ years of age, or unable to sign because: ______________________________________________________________________________________________    If a phone consent is obtained, consent will be documented by using two health care professionals, each affirming that the consenting party has no questions and gives consent for the procedure discussed with the physician/provider.   _____________________          ____________________       _____/_____am/pm   2nd witness to phone consent        Printed name           Date / Time    Informed Consent:  I have provided the explanation described above in section 1 to the patient and/or legal representative.  I have provided the patient and/or legal representative with an opportunity to ask any questions about the proposed operation/procedure.   ___________________________          ____________________         ____/____am/pm  Provider / Proceduralist                            Printed name            Date / Time  Revised 8/2/2021                                                                      Page 2 of 2

## 2022-09-08 NOTE — TELEPHONE ENCOUNTER
Dr Collin Sevilla wants you to be sure to look at the 2 EKG's to make sure you are ok with the results before doing surgery

## 2022-09-08 NOTE — PROGRESS NOTES
[facility] PRE-SURGICAL TESTING INSTRUCTIONS                      PRIOR TO PROCEDURE DATE:    1. PLEASE FOLLOW ANY INSTRUCTIONS GIVEN TO YOU PER YOUR SURGEON. 2. Arrange for someone to drive you home and be with you for the first 24 hours after discharge for your safety after your procedure for which you received sedation. Ensure it is someone we can share information with regarding your discharge. NOTE: At this time ONLY 2 ADULTS may accompany you & everyone must be MASKED. 3. You must contact your surgeon for instructions IF:  You are taking any blood thinners, aspirin, anti-inflammatory or vitamins. There is a change in your physical condition such as a cold, fever, rash, cuts, sores, or any other infection, especially near your surgical site. 4. Do not drink alcohol the day before or day of your procedure. Do not use any recreational marijuana at least 24 hours or street drugs (heroin, cocaine) at minimum 5 days prior to your procedure. 5. A Pre-Surgical History and Physical MUST be completed WITHIN 30 DAYS OR LESS prior to your procedure. by your Physician or an Urgent Care        THE DAY OF YOUR PROCEDURE:  1. Follow instructions for ARRIVAL TIME as DIRECTED BY YOUR SURGEON. 2. Enter the MAIN entrance from [address] and follow the signs to the free Parking [employer] (offered free of charge 7 am-5pm). 3. Enter the Main Entrance of the hospital (do not enter from the lower level of the parking garage). Upon entrance, check in with the  at the surgical information desk on your LEFT. Bring your insurance card and photo ID to register      4. DO NOT EAT ANYTHING 8 hours prior to arrival for surgery. You may have up to 8 ounces of water 4 hours prior to your arrival for surgery.    NOTE: ALL Gastric, Bariatric & Bowel surgery patients - you MUST follow your surgeon's instructions regarding eating/ drinking as you will have very specific instructions to follow. If you did not receive these, call your surgeon's office immediately. 5. MEDICATIONS:  Take the following medications with a SMALL sip of water: [address] .. BRING SINEMET WITH YOU DAY OF SURGERY  Use your usual dose of inhalers the morning of surgery. BRING your rescue inhaler with you to hospital.   Anesthesia does NOT want you to take insulin the morning of surgery. They will control your blood sugar while you are at the hospital. Please contact your ordering physician for instructions regarding your insulin the night before your procedure. If you have an insulin pump, please keep it set on basal rate. Bariatric patient's call your surgeon if on diabetic medications as some may need to be stopped 1 week prior to surgery    6. Do not swallow additional water when brushing teeth. No gum, candy, mints, or ice chips. Refrain from smoking or at least decrease the amount on day of surgery. 7. Morning of surgery:   Take a shower with an antibacterial soap (i.e., Safeguard or Dial) OR your physician may have instructed you to use Hibiclens. Dress in loose, comfortable clothing appropriate for redressing after your procedure. Do not wear jewelry (including body piercings), make-up (especially NO eye make-up), fingernail polish (NO toenail polish if foot/leg surgery), lotion, powders, or metal hairclips. Do not shave or wax for 72 hours prior to procedure near your operative site. Shaving with a razor can irritate your skin and make it easier to develop an infection. On the day of your procedure, any hair that needs to be removed near the surgical site will be 'clipped' by a healthcare worker using a special clipper designed to avoid skin irritation. 8. Dentures, glasses, or contacts will need to be removed before your procedure. Bring cases for your glasses, contacts, dentures, or hearing aids to protect them while you are in surgery.       9. If you use a CPAP, please bring it with you on the day of your procedure. 10. We recommend that valuable personal belongings such as cash, cell phones, e-tablets, or jewelry, be left at home during your stay. The hospital will not be responsible for valuables that are not secured in the hospital safe. However, if your insurance requires a co-pay, you may want to bring a method of payment, i.e., Check or credit card, if you wish to pay your co-pay the day of surgery. 11. If you are to stay overnight, you may bring a bag with personal items. Please have any large items you may need brought in by your family after your arrival to your hospital room. 12. If you have a Living Will or Durable Power of , please bring a copy on the day of your procedure. How we keep you safe and work to prevent surgical site infections:   1. Health care workers should always check your ID bracelet to verify your name and birth date. You will be asked many times to state your name, date of birth, and allergies. 2. Health care workers should always clean their hands with soap or alcohol gel before providing care to you. It is okay to ask anyone if they cleaned their hands before they touch you. 3. You will be actively involved in verifying the type of procedure you are having and ensuring the correct surgical site. This will be confirmed multiple times prior to your procedure. Do NOT francis your surgery site UNLESS instructed to by your surgeon. 4. When you are in the operating room, your surgical site will be cleansed with a special soap, and in most cases, you will be given an antibiotic before the surgery begins. What to expect AFTER your procedure? 1. Immediately following your procedure, your will be taken to the PACU for the first phase of your recovery. Your nurse will help you recover from any potential side effects of anesthesia, such as extreme drowsiness, changes in your vital signs or breathing patterns.  Nausea,

## 2022-09-09 ENCOUNTER — TELEPHONE (OUTPATIENT)
Dept: FAMILY MEDICINE CLINIC | Age: 77
End: 2022-09-09

## 2022-09-09 ENCOUNTER — ANESTHESIA EVENT (OUTPATIENT)
Dept: OPERATING ROOM | Age: 77
End: 2022-09-09
Payer: MEDICARE

## 2022-09-09 NOTE — TELEPHONE ENCOUNTER
Oriana Mckay from Dr. Vickey Helm called stating that there will be an anaesthesia review on Monday concerning the patient. If there are any questions concerning this matter please call [phone].

## 2022-09-09 NOTE — TELEPHONE ENCOUNTER
Please call cardiologist's office, Dr. Veronica Gardner , to follow up on cardiac clearance from EKG done in office yesterday with sinus bradycardia.

## 2022-09-09 NOTE — TELEPHONE ENCOUNTER
Mayr Gloria from Pre admissions testing called stating patient is having surgery on Monday and she needs a SIGNED History and Physical in order to use it for the procedure. If you have any questions call [phone].

## 2022-09-09 NOTE — PROGRESS NOTES
9/9/22 @ 1113 called PCP office and spoke with Juno remind PCP to sign H&P  [phone]   /NR      9/9 /22  @  [facility] office @ [phone] and notified them pt needs cardiac cl  after he saw PCP today. Pt had abnormal EKG. Awaiting further communication.   Joseline Shah

## 2022-09-12 ENCOUNTER — ANESTHESIA (OUTPATIENT)
Dept: OPERATING ROOM | Age: 77
End: 2022-09-12
Payer: MEDICARE

## 2022-09-12 ENCOUNTER — HOSPITAL ENCOUNTER (OUTPATIENT)
Age: 77
Setting detail: OUTPATIENT SURGERY
Discharge: HOME | End: 2022-09-12
Attending: OTOLARYNGOLOGY | Admitting: OTOLARYNGOLOGY
Payer: MEDICARE

## 2022-09-12 VITALS
SYSTOLIC BLOOD PRESSURE: 169 MMHG | HEART RATE: 79 BPM | TEMPERATURE: 97.8 F | OXYGEN SATURATION: 99 % | HEIGHT: 68 IN | BODY MASS INDEX: 22.88 KG/M2 | WEIGHT: 151 LBS | RESPIRATION RATE: 14 BRPM | DIASTOLIC BLOOD PRESSURE: 81 MMHG

## 2022-09-12 DIAGNOSIS — G89.18 ACUTE POST-OPERATIVE PAIN: Primary | ICD-10-CM

## 2022-09-12 PROBLEM — J34.2 DEVIATED NASAL SEPTUM: Status: ACTIVE | Noted: 2022-09-12

## 2022-09-12 PROBLEM — J34.89 NASAL OBSTRUCTION: Status: ACTIVE | Noted: 2022-09-12

## 2022-09-12 PROBLEM — S02.2XXB OPEN FRACTURE OF NASAL BONES: Status: ACTIVE | Noted: 2022-09-12

## 2022-09-12 PROBLEM — M95.0 ACQUIRED NASAL DEFORMITY: Status: ACTIVE | Noted: 2022-09-12

## 2022-09-12 PROCEDURE — 7100000011 HC PHASE II RECOVERY - ADDTL 15 MIN: Performed by: OTOLARYNGOLOGY

## 2022-09-12 PROCEDURE — 30520 REPAIR OF NASAL SEPTUM: CPT | Performed by: OTOLARYNGOLOGY

## 2022-09-12 PROCEDURE — 7100000000 HC PACU RECOVERY - FIRST 15 MIN: Performed by: OTOLARYNGOLOGY

## 2022-09-12 PROCEDURE — 6370000000 HC RX 637 (ALT 250 FOR IP): Performed by: OTOLARYNGOLOGY

## 2022-09-12 PROCEDURE — 2500000003 HC RX 250 WO HCPCS: Performed by: FAMILY MEDICINE

## 2022-09-12 PROCEDURE — 7100000010 HC PHASE II RECOVERY - FIRST 15 MIN: Performed by: OTOLARYNGOLOGY

## 2022-09-12 PROCEDURE — 3700000001 HC ADD 15 MINUTES (ANESTHESIA): Performed by: OTOLARYNGOLOGY

## 2022-09-12 PROCEDURE — 3600000014 HC SURGERY LEVEL 4 ADDTL 15MIN: Performed by: OTOLARYNGOLOGY

## 2022-09-12 PROCEDURE — 21320 CLSD TX NSL FX W/MNPJ&STABLJ: CPT | Performed by: OTOLARYNGOLOGY

## 2022-09-12 PROCEDURE — 2500000003 HC RX 250 WO HCPCS: Performed by: OTOLARYNGOLOGY

## 2022-09-12 PROCEDURE — 7100000001 HC PACU RECOVERY - ADDTL 15 MIN: Performed by: OTOLARYNGOLOGY

## 2022-09-12 PROCEDURE — 3700000000 HC ANESTHESIA ATTENDED CARE: Performed by: OTOLARYNGOLOGY

## 2022-09-12 PROCEDURE — A4217 STERILE WATER/SALINE, 500 ML: HCPCS | Performed by: OTOLARYNGOLOGY

## 2022-09-12 PROCEDURE — 3600000004 HC SURGERY LEVEL 4 BASE: Performed by: OTOLARYNGOLOGY

## 2022-09-12 PROCEDURE — 6360000002 HC RX W HCPCS: Performed by: FAMILY MEDICINE

## 2022-09-12 PROCEDURE — 2580000003 HC RX 258: Performed by: OTOLARYNGOLOGY

## 2022-09-12 PROCEDURE — 2709999900 HC NON-CHARGEABLE SUPPLY: Performed by: OTOLARYNGOLOGY

## 2022-09-12 PROCEDURE — 6370000000 HC RX 637 (ALT 250 FOR IP): Performed by: ANESTHESIOLOGY

## 2022-09-12 PROCEDURE — 6360000002 HC RX W HCPCS: Performed by: ANESTHESIOLOGY

## 2022-09-12 PROCEDURE — 2580000003 HC RX 258: Performed by: FAMILY MEDICINE

## 2022-09-12 RX ORDER — MEPERIDINE HYDROCHLORIDE 25 MG/ML
12.5 INJECTION INTRAMUSCULAR; INTRAVENOUS; SUBCUTANEOUS EVERY 5 MIN PRN
Status: DISCONTINUED | OUTPATIENT
Start: 2022-09-12 | End: 2022-09-12 | Stop reason: HOSPADM

## 2022-09-12 RX ORDER — LIDOCAINE HYDROCHLORIDE 20 MG/ML
INJECTION, SOLUTION EPIDURAL; INFILTRATION; INTRACAUDAL; PERINEURAL PRN
Status: DISCONTINUED | OUTPATIENT
Start: 2022-09-12 | End: 2022-09-12 | Stop reason: SDUPTHER

## 2022-09-12 RX ORDER — OXYMETAZOLINE HYDROCHLORIDE 0.05 G/100ML
SPRAY NASAL PRN
Status: DISCONTINUED | OUTPATIENT
Start: 2022-09-12 | End: 2022-09-12 | Stop reason: HOSPADM

## 2022-09-12 RX ORDER — TRAMADOL HYDROCHLORIDE 50 MG/1
50 TABLET ORAL ONCE
Status: COMPLETED | OUTPATIENT
Start: 2022-09-12 | End: 2022-09-12

## 2022-09-12 RX ORDER — MAGNESIUM HYDROXIDE 1200 MG/15ML
LIQUID ORAL CONTINUOUS PRN
Status: DISCONTINUED | OUTPATIENT
Start: 2022-09-12 | End: 2022-09-12 | Stop reason: HOSPADM

## 2022-09-12 RX ORDER — PROPOFOL 10 MG/ML
INJECTION, EMULSION INTRAVENOUS PRN
Status: DISCONTINUED | OUTPATIENT
Start: 2022-09-12 | End: 2022-09-12 | Stop reason: SDUPTHER

## 2022-09-12 RX ORDER — SODIUM CHLORIDE 0.9 % (FLUSH) 0.9 %
5-40 SYRINGE (ML) INJECTION EVERY 12 HOURS SCHEDULED
Status: DISCONTINUED | OUTPATIENT
Start: 2022-09-12 | End: 2022-09-12 | Stop reason: HOSPADM

## 2022-09-12 RX ORDER — ROCURONIUM BROMIDE 10 MG/ML
INJECTION, SOLUTION INTRAVENOUS PRN
Status: DISCONTINUED | OUTPATIENT
Start: 2022-09-12 | End: 2022-09-12 | Stop reason: SDUPTHER

## 2022-09-12 RX ORDER — ONDANSETRON 2 MG/ML
INJECTION INTRAMUSCULAR; INTRAVENOUS PRN
Status: DISCONTINUED | OUTPATIENT
Start: 2022-09-12 | End: 2022-09-12 | Stop reason: SDUPTHER

## 2022-09-12 RX ORDER — DEXAMETHASONE SODIUM PHOSPHATE 4 MG/ML
INJECTION, SOLUTION INTRA-ARTICULAR; INTRALESIONAL; INTRAMUSCULAR; INTRAVENOUS; SOFT TISSUE PRN
Status: DISCONTINUED | OUTPATIENT
Start: 2022-09-12 | End: 2022-09-12 | Stop reason: SDUPTHER

## 2022-09-12 RX ORDER — PROCHLORPERAZINE EDISYLATE 5 MG/ML
5 INJECTION INTRAMUSCULAR; INTRAVENOUS
Status: DISCONTINUED | OUTPATIENT
Start: 2022-09-12 | End: 2022-09-12 | Stop reason: HOSPADM

## 2022-09-12 RX ORDER — SUCCINYLCHOLINE/SOD CL,ISO/PF 200MG/10ML
SYRINGE (ML) INTRAVENOUS PRN
Status: DISCONTINUED | OUTPATIENT
Start: 2022-09-12 | End: 2022-09-12 | Stop reason: SDUPTHER

## 2022-09-12 RX ORDER — SODIUM CHLORIDE 9 MG/ML
25 INJECTION, SOLUTION INTRAVENOUS PRN
Status: DISCONTINUED | OUTPATIENT
Start: 2022-09-12 | End: 2022-09-12 | Stop reason: HOSPADM

## 2022-09-12 RX ORDER — GLYCOPYRROLATE 0.2 MG/ML
INJECTION INTRAMUSCULAR; INTRAVENOUS PRN
Status: DISCONTINUED | OUTPATIENT
Start: 2022-09-12 | End: 2022-09-12 | Stop reason: SDUPTHER

## 2022-09-12 RX ORDER — AMOXICILLIN 500 MG/1
500 CAPSULE ORAL 2 TIMES DAILY
Qty: 14 CAPSULE | Refills: 0 | Status: SHIPPED | OUTPATIENT
Start: 2022-09-12 | End: 2022-09-19

## 2022-09-12 RX ORDER — TRAMADOL HYDROCHLORIDE 50 MG/1
50 TABLET ORAL EVERY 4 HOURS PRN
Qty: 30 TABLET | Refills: 0 | Status: SHIPPED | OUTPATIENT
Start: 2022-09-12 | End: 2022-09-19

## 2022-09-12 RX ORDER — HYDRALAZINE HYDROCHLORIDE 20 MG/ML
10 INJECTION INTRAMUSCULAR; INTRAVENOUS
Status: DISCONTINUED | OUTPATIENT
Start: 2022-09-12 | End: 2022-09-12 | Stop reason: HOSPADM

## 2022-09-12 RX ORDER — SODIUM CHLORIDE 9 MG/ML
INJECTION, SOLUTION INTRAVENOUS PRN
Status: DISCONTINUED | OUTPATIENT
Start: 2022-09-12 | End: 2022-09-12 | Stop reason: HOSPADM

## 2022-09-12 RX ORDER — SODIUM CHLORIDE 0.9 % (FLUSH) 0.9 %
5-40 SYRINGE (ML) INJECTION PRN
Status: DISCONTINUED | OUTPATIENT
Start: 2022-09-12 | End: 2022-09-12 | Stop reason: HOSPADM

## 2022-09-12 RX ORDER — SODIUM CHLORIDE, SODIUM LACTATE, POTASSIUM CHLORIDE, CALCIUM CHLORIDE 600; 310; 30; 20 MG/100ML; MG/100ML; MG/100ML; MG/100ML
INJECTION, SOLUTION INTRAVENOUS CONTINUOUS
Status: DISCONTINUED | OUTPATIENT
Start: 2022-09-12 | End: 2022-09-12 | Stop reason: HOSPADM

## 2022-09-12 RX ORDER — LIDOCAINE HYDROCHLORIDE AND EPINEPHRINE 10; 10 MG/ML; UG/ML
INJECTION, SOLUTION INFILTRATION; PERINEURAL PRN
Status: DISCONTINUED | OUTPATIENT
Start: 2022-09-12 | End: 2022-09-12 | Stop reason: HOSPADM

## 2022-09-12 RX ORDER — FENTANYL CITRATE 50 UG/ML
INJECTION, SOLUTION INTRAMUSCULAR; INTRAVENOUS PRN
Status: DISCONTINUED | OUTPATIENT
Start: 2022-09-12 | End: 2022-09-12 | Stop reason: SDUPTHER

## 2022-09-12 RX ADMIN — SUGAMMADEX 200 MG: 100 INJECTION, SOLUTION INTRAVENOUS at 16:02

## 2022-09-12 RX ADMIN — ROCURONIUM BROMIDE 20 MG: 10 INJECTION INTRAVENOUS at 15:48

## 2022-09-12 RX ADMIN — SODIUM CHLORIDE, POTASSIUM CHLORIDE, SODIUM LACTATE AND CALCIUM CHLORIDE: 600; 310; 30; 20 INJECTION, SOLUTION INTRAVENOUS at 13:55

## 2022-09-12 RX ADMIN — DEXAMETHASONE SODIUM PHOSPHATE 4 MG: 4 INJECTION, SOLUTION INTRAMUSCULAR; INTRAVENOUS at 15:48

## 2022-09-12 RX ADMIN — PROPOFOL 30 MG: 10 INJECTION, EMULSION INTRAVENOUS at 15:48

## 2022-09-12 RX ADMIN — GLYCOPYRROLATE 0.2 MG: 0.2 INJECTION INTRAMUSCULAR; INTRAVENOUS at 15:39

## 2022-09-12 RX ADMIN — HYDRALAZINE HYDROCHLORIDE 10 MG: 20 INJECTION INTRAMUSCULAR; INTRAVENOUS at 17:35

## 2022-09-12 RX ADMIN — LIDOCAINE HYDROCHLORIDE 60 MG: 20 INJECTION, SOLUTION EPIDURAL; INFILTRATION; INTRACAUDAL; PERINEURAL at 15:33

## 2022-09-12 RX ADMIN — TRAMADOL HYDROCHLORIDE 50 MG: 50 TABLET ORAL at 17:17

## 2022-09-12 RX ADMIN — ONDANSETRON 4 MG: 2 INJECTION INTRAMUSCULAR; INTRAVENOUS at 15:49

## 2022-09-12 RX ADMIN — PROPOFOL 50 MG: 10 INJECTION, EMULSION INTRAVENOUS at 15:36

## 2022-09-12 RX ADMIN — PROPOFOL 25 MG: 10 INJECTION, EMULSION INTRAVENOUS at 15:37

## 2022-09-12 RX ADMIN — FENTANYL CITRATE 50 MCG: 50 INJECTION, SOLUTION INTRAMUSCULAR; INTRAVENOUS at 15:34

## 2022-09-12 RX ADMIN — Medication 120 MG: at 15:38

## 2022-09-12 ASSESSMENT — PAIN DESCRIPTION - ORIENTATION: ORIENTATION: MID

## 2022-09-12 ASSESSMENT — PAIN SCALES - GENERAL
PAINLEVEL_OUTOF10: 5
PAINLEVEL_OUTOF10: 0
PAINLEVEL_OUTOF10: 4
PAINLEVEL_OUTOF10: 2
PAINLEVEL_OUTOF10: 0

## 2022-09-12 ASSESSMENT — PAIN DESCRIPTION - LOCATION: LOCATION: NOSE

## 2022-09-12 ASSESSMENT — PAIN DESCRIPTION - DESCRIPTORS: DESCRIPTORS: BURNING;DISCOMFORT

## 2022-09-12 ASSESSMENT — ENCOUNTER SYMPTOMS: DYSPNEA ACTIVITY LEVEL: NO INTERVAL CHANGE

## 2022-09-12 NOTE — ANESTHESIA POSTPROCEDURE EVALUATION
Department of Anesthesiology  Postprocedure Note    Patient: Lou Pagan  MRN: [id number]  YOB: 1945  Date of evaluation: 9/12/2022      Procedure Summary     Date: 09/12/22 Room / Location: [address] / [facility]    Anesthesia Start: 6440 Anesthesia Stop: 3612    Procedures:       CLOSED REDUCTION NASAL FRACTURE AND SEPTOPLASTY      . Diagnosis:       Open fracture of nasal bone, initial encounter      Nasal obstruction      Deviated nasal septum      Acquired nasal deformity      (Open fracture of nasal bone, initial encounter [S02. 2XXB]  Nasal obstruction [J34.89]  Deviated nasal septum [J34.2]  Acquired nasal deformity [M95.0])    Surgeons: Nidhi Cr DO Responsible Provider: Laine Gonzales MD    Anesthesia Type: general ASA Status: 3          Anesthesia Type: No value filed.     Nagi Phase I: Nagi Score: 10    Nagi Phase II:        Anesthesia Post Evaluation    Patient location during evaluation: PACU  Level of consciousness: awake  Complications: no  Multimodal analgesia pain management approach

## 2022-09-12 NOTE — OP NOTE
Patient Name: Padmini Aaron  YOB: 1945  Medical Record Number:  [id number]  Billing Number:  [id number]  Date of Procedure: 9/12/2022  Time: 4495    Pre Operative Diagnoses:  1. Open nasal fracture 2 acquired nasal deformity 3. Deviated nasal septum. Post Operative Diagnoses:  same. Procedure:    1. Nasal septal reconstruction  2. Closed reduction nasal fracture           Surgeon: Marian Closs, DO  OR Staff: Circulator: Nancie Finn RN  Scrub Person First: Allyn Hernandez  Anesthesia:  General anesthesia. Specimens: * No specimens in log *  Estimated Blood Loss: minimal  Complications:  None. Findings:    Deviation of bony nasal pyramid to left. Reduced without complication. Septal fracture and deviation in s-shape. Indications:  Carmine Laurent is a now 68 y.o. male with a history of recent mechanical fall resulting in nasal and septal fracture. Recommended surgical intervention    Description:   After verification of informed consent, the patient was taken to the operating suite, transferred to the operating table, sedated with general anesthesia and endotracheally intubated. The head of the bed is rotated 90°. The patient was prepped and draped in a standard fashion. The nose was injected with 1% lidocaine 1 100,000 epinephrine. Using a 15 blade a left-sided hemitransfixion incision was made in the septal mucosa. Dissection was carried down to the level of the mucoperichondrial plane. A Rathdrum elevator was used to elevate a mucoperichondrial flap. Once the flap was elevated at 15 blade was used to make a transverse cartilaginous incision. The contralateral flap is elevated in similar fashion. A Manuel swivel knife was used to resect a portion of the quadrangular cartilage leaving sufficient superior and anterior cartilage for tip and dorsal support. Once this was complete an angled scissors using make a cut and the perpendicular plate of ethmoid.   Ken forceps were then used to piecemeal removed perpendicular plate of ethmoid and vomer. Conchetta Ripton was used to elevate the flaps off the maxillary crest.  An osteotome was used to remove the deviated portion of the maxillary crest.  The flaps and placed back down. The incision was closed with 4-0 chromic simple interrupted suture. Using a Boies elevator and digital manipulation, the bony pyramid fracture was reduced without difficulty. Examination of the nasal passages reveal significant improvement in the obstruction. Bactroban coated Valle splints were then placed in the nares bilaterally and sutured in place with a 3-0 Prolene suture. This concluded our portion of the procedure. The care the patient was turned back over to the anesthesia team.  The head of bed is rotated and degrees. The patient was taken off anesthesia extubated and transferred to PACU in stable condition. There were no competitions with the procedure. The patient tolerated the procedure well. Disposition/Plan:  Stable to PACU.

## 2022-09-12 NOTE — DISCHARGE INSTRUCTIONS
[address] [place] [address] ENT  [facility] Favian D.O.  [address] [address] [facility]. [address]  [phone]      POST-OP NASAL SURGERY INSTRUCTIONS    Diet  Resume regular diet. Avoid hot and spicy foods, as this may increase nasal blood flow and oozing  Drink plenty of liquids    Activity  DO NOT blow your nose. Cough and sneeze with you mouth open if you need to do so. Avoid Straining, bending or lifting or vigorous exercise for 2 weeks  Keep the head of your bed elevated to reduce swelling for the first 72 hours  May shower the day after surgery    General Instructions  Change the drip pad (gauze) under your nose as needed. In the first 24-48 hours, you may need to change the drip pad every 15-20 minutes. This is normal. Do not be alarmed. If you experience excessive bleeding or bleeding that does not subside after 15 minutes, you should call the number above. You may place ice packs on your nose to alleviate swelling and discomfort. DO NOT place ice packs directly on nose, wrap the pack in a cloth before application. Splints will be placed in your nose, this will cause stuffiness and mild discomfort. They will be removed at your first post-op visit. Medications  Resume your normal medications  Take antibiotics prescribed  Take pain medications as needed for pain  DO NOT use any herbal medicines/diet pills for two weeks after surgery. Saline nasal spray can be purchased over the counter, and should be used 4 times daily to keep the nasal passageways moist.      Call the Office  Fever greater than 100.4  Excessive nasal bleeding. Sudden increase in pain  [facility]    There are potential side effects of anesthesia or sedation you may experience for the first 24 hours. These side effects include:    Confusion or Memory loss, Dizziness, or Delayed Reaction Times   [x]A responsible person should be with you for the next 24 hours.   Do not operate any vehicles (automobiles, bicycles, motorcycles) or power tools or machinery for 24 hours. Do not sign any legal documents or make any legal decisions for 24 hours. Do not drink alcohol for 24 hours or while taking narcotic pain medication. Nausea    [x]Start with light diet and progress to your normal diet as you feel like eating. However, if you experience nausea or repeated episodes of vomiting which persist beyond 12-24 hours, notify your physician. Once nausea has passed, remember to keep drinking fluids. Difficulty Passing Urine  [x]Drink extra amounts of fluid today. Notify your physician if you have not urinated within 8 hours after your procedure or you feel uncomfortable. Irritated Throat from a Breathing Tube  [x]Drink extra amounts of fluid today. Lozenges may help. Muscle Aches  [x]You may experience some generalized body aches as your muscles recover from medications used to relax them during surgery. These will gradually subside. MEDICATION INSTRUCTIONS:  []Prescription(S) x     sent with you. Use as directed. When taking pain medications, you may experience the side effect of dizziness or drowsiness. Do not drink alcohol or drive when taking these medications. [x]Prescription(S) x  2        Called to Pharmacy Name and location: [address] [place] [address]    [x]Give the list of your medications to your primary care physician on your next visit. Keep your med list updated and carry it with in case of emergencies. [x] Narcotic pain medications can cause the side effect of significant constipation. You may want to add a stool softener to your postoperative medication schedule or speak to your surgeon on how best to manage this side effect. NARCOTIC SAFETY:  Your pain medicine is only for you to take. Safely store your medicines. Store pills up high and out of reach of children and pets.   Ensure safety caps are snapped tightly  Keep track of how many pills you have left    Unused medication can be disposed of by taking them to a drop-off box or take-back program that is authorized by the [place]. Access to a site near you can be found on the [facility] website ([address] [employer] [facility].gov). If you have a CPAP machine, it is very important that you use it daily during all periods of sleep and daytime rest during your recovery at home. Surgery and Anesthesia place a significant amount of stress on your body. Using your CPAP will help keep you safe and lessen the negative effects of that stress. FOLLOW-UP RECOVERY CARE:  [x]Call the office at [phone] for follow-up appointment and problems    Watch for these possible complications, symptoms, or side effects of anesthesia. Call physician if they or any other problems occur:  Signs of INFECTION   > Fever over 101°     > Redness, swelling, hardness or warmth at the operative site   >Foul smelling or cloudy drainage at the operative site   Unrelieved PAIN  Unrelieved NAUSEA  Blood soaked dressing. (Some oozing may be normal)  Inability to urinate      Numb, pale, blue, cold or tingling extremity      Physician:  Dr. Raúl Miranda    The above instructions were reviewed with patient/significant other. The following additional patient specific information was reviewed with the patient/significant other:  [x]Procedure/physician specific instructions  []Medication information sheet(S) including potential side effects  []Jens egress test  []Pain Ball management  []FAQ Catheter associated blood stream infections  []FAQ Surgical Site Infections  []Other-    I have read and understand the instructions given to me: ____________________________________________   (Patient/S.O. Signature)            Date/time 9/12/2022 4:59 PM         PACU:  [phone]   M-F 700 AM - 7 PM      SAME DAY SERVICES:  [phone] M-F 7AM-6PM        If you smoke STOP. We care about your health!

## 2022-09-12 NOTE — BRIEF OP NOTE
Brief Postoperative Note      Patient: Sienna Stark  YOB: 1945  MRN: [id number]    Date of Procedure: 9/12/2022    Pre-Op Diagnosis: Open fracture of nasal bone, initial encounter [S02. 2XXB]  Nasal obstruction [J34.89]  Deviated nasal septum [J34.2]  Acquired nasal deformity [M95.0]    Post-Op Diagnosis: Same       Procedure(s):  CLOSED REDUCTION NASAL FRACTURE AND SEPTOPLASTY  .     Surgeon(s):  Shannon Cotton DO    Assistant:  * No surgical staff found *    Anesthesia: General    Estimated Blood Loss (mL): Minimal    Complications: None    Specimens:   * No specimens in log *    Implants:  * No implants in log *      Drains: * No LDAs found *    Findings: see op note    Electronically signed by Shannon Cotton DO on 9/12/2022 at 4:08 PM

## 2022-09-12 NOTE — H&P
Jade Lennon needs to be seen for an appointment before further refills are given.   Lou Pagan    [phone]    [facility] Same Day Surgery Update H & P  Department of General Surgery   Surgical Service   Pre-operative History and Physical  Last H & P within the last 30 days. DIAGNOSIS:   Open fracture of nasal bone, initial encounter [S02. 2XXB]  Nasal obstruction [J34.89]  Deviated nasal septum [J34.2]  Acquired nasal deformity [M95.0]    Procedure(s):  CLOSED REDUCTION NASAL FRACTURE AND SEPTOPLASTY  . History obtained from: Patient interview and EHR      HISTORY OF PRESENT ILLNESS:   The patient is a 68 y.o. male with open nasal fracture after a  fall presents today for the above procedure. Illness Screening: Patient denies fever, chills, worsening cough, or close contact with sick individuals. Past Medical History:        Diagnosis Date    Anxiety     PCP in [place]:Dr. Jhonny Lomas.    Arthritis     Arthritis of hand, right 06/20/2012    Sneed esophagus     Dr. Agnes Gray. EGD:1/17/2012. Next EGD due in 3yrs=1/2015    Sneed esophagus     Under care of GI    Bilateral carotid artery stenosis <50% 04/30/2014    BPH (benign prostatic hypertrophy)     Dr. Jasvir Tyler. PSA per urology.     CAD (coronary artery disease)     under care of cards:Dr. Garry Kirby    Carotid stenosis     Chest pain     pt states he no chest pain in 5 yrs, panic attack    Chronic back pain 01/26/2012    Chronic back pain     under care of ortho spine:Dr. Sweeney    Chronic kidney disease     Constipation 08/06/2013    Degenerative arthritis of thoracic spine 01/26/2012    Degenerative cervical disc 01/26/2012    Depression with anxiety 11/15/2011    Klonopin per neurologist(Dr. Bisi Bryant)    Diverticulosis 01/17/2012    colonoscopy    Elevated PSA     8/21/14;5/2013:under care of Dr. Beltran/[place]:Urology group    Erectile dysfunction 06/13/2017    Essential hypertension, benign 06/20/2012    cardiologist:Dr. Leonardo Watson    GERD (gastroesophageal reflux disease)     Hemorrhoid     Hiatal hernia small    Hyperlipidemia     Impaired fasting glucose 05/19/2014    Osteopenia per CXR 05/19/2014    Parkinson disease ([facility] [address]     Dr. Silvino Enriquez Neurology    Renal stone 04/2012    4 mm distal left ureteral calculus:Dr. Beltran:urologist    RLS (restless legs syndrome)     S/P colonoscopy 2011;5/20/19    Dr. Del Toro Duty per pt' next is in 10yrs-2021. 5/20/19(Dr. Elaine)    S/P endoscopy 01/2012    Dr. Naa Cedillo acute changes:+Barrets:next in 3yrs 1/2015    Sigmoidoscopy exam 01/17/2012    Dr. Lama:No acute changes. Next in 5yrs=1/2017    Therapeutic drug monitoring 05/14/2015    Consistent OARRS report on 5/14/15;8/4/15    Thoracic spondylosis     under care of ortho spine:Dr. Franca Rojas    Venous insufficiency of leg     Vocal cord paresis 05/11/2016    under care of ENT:Dr. Daquan Jernigan. s/p vocal cord augmentation 6/2016 at [address]    Wears glasses      Past Surgical History:        Procedure Laterality Date    CATARACT REMOVAL      COLONOSCOPY  2002;2011    COLONOSCOPY  8/13/2021    COLONOSCOPY DIAGNOSTIC performed by Allyn Almendarez MD at [facility] [address] Right 02/23/2017    Right TKR(knee)    KNEE ARTHROPLASTY Left 7/30/13    LEFT TOTAL KNEE ARTHROPLASTY              KNEE CARTILAGE SURGERY      Left x 2, right x1    LITHOTRIPSY      Kidney stone removal as per urology:Stent removed after 10days    OSTEOTOMY Left     TURP N/A [id number]    TRANSURETHRAL RESECTION OF PROSTATE    UPPER GASTROINTESTINAL ENDOSCOPY  5/07       Medications Prior to Admission:      Prior to Admission medications    Medication Sig Start Date End Date Taking?  Authorizing Provider   TURMERIC PO Take 5 mg by mouth daily   Yes Historical Provider, MD   Amantadine HCl ER (GOCOVRI) 137 MG CP24 1 po qhs 9/8/22   Jack Alvarez MD   Coenzyme Q10 300 MG CAPS 1 po qd 9/8/22   Jack Alvarez MD   oxybutynin (DITROPAN-XL) 10 MG extended release tablet daily 8/19/22   Historical Provider, MD   donepezil (ARICEPT) 10 MG tablet Take 1 clear to auscultation bilaterally, no crackles or wheezing    Abdomen:  Soft, non-distended, non-tender, no rebound tenderness or guarding, and no masses palpated    ASSESSMENT AND PLAN     Patient is a 68 y.o. male with above specified procedure planned. 1.  The patients history and physical was obtained and signed off by the pre-admission testing department. Patient seen and focused exam done today- no new changes since last physical exam on 9/8/22     2. Access to ancillary services are available per request of the provider.     ROGE Brady - [place]     9/12/2022

## 2022-09-12 NOTE — FLOWSHEET NOTE
Preparing to go to [facility] for discharge. Stood at bedside to void with heavy assist x 2 RNs. BP now 183/101. Will treat per MAR.

## 2022-09-12 NOTE — PROGRESS NOTES
Ambulatory Surgery/Procedure Discharge Note    Vitals:    09/12/22 1801   BP: (!) 169/81   Pulse: 79   Resp: 14   Temp: 97.8 °F (36.6 °C)   SpO2: 99%   Pt met criteria for discharge per Nagi score. In: 270 [P.O.:120; I.V.:150]  Out: 200 [Urine:200]    Restroom use offered before discharge. Yes    Pain assessment:  present - not adequately   Pain Level: 2      Pt and S.O./family states \"ready to go home\". Pt alert and oriented x4. IV removed. Denies N/V or pain. Mustache dressing to nose, splint to nose. Voided prior to discharge. Discharge instructions given to pt and wife and daughter with pt permission. Pt, daughter, and wife verbalized understanding of all instructions. Left with all belongings, 2 escribed prescriptions, and discharge instructions. Patient discharged to home/self care.  Patient discharged via wheel chair by transporter to waiting family/S.O.       9/12/2022 6:38 PM

## 2022-09-12 NOTE — PROGRESS NOTES
PACU Transfer to [facility]    Vitals:    09/12/22 1745   BP: 170/90   Pulse: 79   Resp: 14   Temp: 97.8 °F (36.6 °C)   SpO2: 100%   Dr. Chantale Newman aware of BP and okay with proceeding to discharge. Intake/Output Summary (Last 24 hours) at 9/12/2022 1811  Last data filed at 9/12/2022 1745  Gross per 24 hour   Intake 270 ml   Output 100 ml   Net 170 ml       Pain assessment:  present - adequately treated, pain pill given in pacu  Pain Level: 4    Patient transferred to care of [facility] RN. Transferred with all belongings to [place] [address] per this RN. Bedside report given to Navin Zuniga RN. Wife and daughter to bedside.     9/12/2022 6:11 PM

## 2022-09-12 NOTE — ANESTHESIA PRE PROCEDURE
Department of Anesthesiology  Preprocedure Note       Name:  Ivon Cole   Age:  68 y.o.  :  1945                                          MRN:  [id number]         Date:  2022      Surgeon: Ever Teixeira):  Bacilio Romano DO    Procedure: Procedure(s):  CLOSED REDUCTION NASAL FRACTURE AND SEPTOPLASTY  . Medications prior to admission:   Prior to Admission medications    Medication Sig Start Date End Date Taking? Authorizing Provider   TURMERIC PO Take 5 mg by mouth daily   Yes Historical Provider, MD   Amantadine HCl ER (GOCOVRI) 137 MG CP24 1 po qhs 22   Arpan Palm MD   Coenzyme Q10 300 MG CAPS 1 po qd 22   Arpan Palm MD   oxybutynin (DITROPAN-XL) 10 MG extended release tablet daily 22   Historical Provider, MD   donepezil (ARICEPT) 10 MG tablet Take 1 tablet by mouth nightly 22   Arpan Palm MD   acetaminophen (TYLENOL) 325 MG tablet Take 1 tablet by mouth every 6 hours as needed for Pain 22   Beba Molina MD   simvastatin (ZOCOR) 10 MG tablet TAKE 1 TABLET EVERY NIGHT 3/7/22   Pro Arciniega MD   imipramine (TOFRANIL) 25 MG tablet TAKE 1 TABLET NIGHTLY 21   ROGE Castorena CNP   Melatonin 10 MG TABS Take by mouth nightly as needed    Historical Provider, MD   Carbidopa-Levodopa ER 48. MG CPCR Take 1 tablet by mouth at bedtime 2 AT BEDTIME    Historical Provider, MD   lidocaine (LIDODERM) 5 % Place 1 patch onto the skin daily 12 hours on, 12 hours off. Patient taking differently: Place 1 patch onto the skin as needed 12 hours on, 12 hours off. 21   Juan Abbott MD   linaclotide (LINZESS) 290 MCG CAPS capsule Take 290 mcg by mouth every morning (before breakfast)    Historical Provider, MD   clonazePAM (KLONOPIN) 0.5 MG tablet Take 1 mg by mouth nightly     Historical Provider, MD   carbidopa-levodopa (SINEMET)  MG per tablet Take 2.5 tablets by mouth 5 times daily 0800, 1200, 1600 and 2000.    (with Rytary)    Historical Provider, MD   Cholecalciferol (VITAMIN D3) 2000 UNITS CAPS   Take 1 capsule by mouth daily     Historical Provider, MD   aspirin 81 MG EC tablet   Take 81 mg by mouth three times a week On Mondays, Wednesdays and Fridays    Historical Provider, MD   Polyethylene Glycol 3350 (MIRALAX PO)   Take 17 g by mouth daily as needed     Historical Provider, MD   pantoprazole (PROTONIX) 40 MG tablet   Take 40 mg by mouth daily     Historical Provider, MD       Current medications:    Current Facility-Administered Medications   Medication Dose Route Frequency Provider Last Rate Last Admin    lactated ringers infusion   IntraVENous Continuous Derek Saenz MD        sodium chloride flush 0.9 % injection 5-40 mL  5-40 mL IntraVENous 2 times per day Derek Saenz MD        sodium chloride flush 0.9 % injection 5-40 mL  5-40 mL IntraVENous PRN Derek Saenz MD        0.9 % sodium chloride infusion   IntraVENous PRN Derek Saenz MD           Allergies: Allergies   Allergen Reactions    Levofloxacin Swelling     lips swell       Problem List:    Patient Active Problem List   Diagnosis Code    Easy bruising R23.8    Rib pain R07.81    Right foot pain M79.671    Fatigue R53.83    Adenopathy R59.9    Dermatitis L30.9    Abdominal wall pain R10.9    Depression with anxiety F41.8    Costochondritis:left M94.0    Rib pain:left R07.81    Other chest pain R07.89    Degenerative cervical disc M50.30    Degenerative arthritis of thoracic spine M47.814    Chronic back pain M54.9, G89.29    Elevated blood-pressure reading without diagnosis of hypertension R03.0    Arthritis of hand, right M19.041    Cyst in hand:Right [id number]    Mass of right hand R22.30    Dupuytren's disease of palm M72.0    Osteoarthritis of left knee M17.12    BPH (benign prostatic hyperplasia) N40.0    HTN (hypertension) I10    Constipation K59.00    Elevated WBC count D72.829    Prostate problem N42.9    ? CVA (cerebral vascular accident) I63.9    Left arm weakness R29.898    Drooling:left sided K11.7    Bilateral carotid artery stenosis <50% I65.23    Abnormal glucose R73.09    Bilateral hip pain M25.551, M25.552    Impaired fasting glucose R73.01    Osteopenia per CXR M85.80    Irregular heart beats sensation I49.9    Palpitations R00.2    Parkinson disease ([facility]) G20    Angina pectoris (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 Fahad Cabrera.    Arthritis     Arthritis of hand, right 06/20/2012    Sneed esophagus     Dr. Tessa Cruz. EGD:1/17/2012. Next EGD due in 3yrs=1/2015    Sneed esophagus     Under care of GI    Bilateral carotid artery stenosis <50% 04/30/2014    BPH (benign prostatic hypertrophy)     Dr. Nico Vasquez. PSA per urology.     CAD (coronary artery disease)     under care of cards:Dr. Alissa Hill    Carotid stenosis     Chest pain     pt states he no chest pain in 5 yrs, panic attack    Chronic back pain 01/26/2012    Chronic back pain     under care of ortho spine:Dr. Ambika Kyle    Chronic kidney disease     Constipation 08/06/2013    Degenerative arthritis of thoracic spine 01/26/2012    Degenerative cervical disc 01/26/2012    Depression with anxiety 11/15/2011    Klonopin per neurologist(Dr. Lew Nelson)    Diverticulosis 01/17/2012 colonoscopy    Elevated PSA     8/21/14;5/2013:under care of Dr. Beltran/[place]:Urology group    Erectile dysfunction 06/13/2017    Essential hypertension, benign 06/20/2012    cardiologist:Dr. Dante Serrato    GERD (gastroesophageal reflux disease)     Hemorrhoid     Hiatal hernia     small    Hyperlipidemia     Impaired fasting glucose 05/19/2014    Osteopenia per CXR 05/19/2014    Parkinson disease ([address]     Dr. Silvino Enriquez Neurology    Renal stone 04/2012    4 mm distal left ureteral calculus:Dr. Beltran:urologist    RLS (restless legs syndrome)     S/P colonoscopy 2011;5/20/19    Dr. Del Toro Duty per pt' next is in 10yrs-2021. 5/20/19(Dr. Elaine)    S/P endoscopy 01/2012    Dr. Naa Cedillo acute changes:+Barrets:next in 3yrs 1/2015    Sigmoidoscopy exam 01/17/2012    Dr. Lama:No acute changes.  Next in 5yrs=1/2017    Therapeutic drug monitoring 05/14/2015    Consistent OARRS report on 5/14/15;8/4/15    Thoracic spondylosis     under care of ortho spine:Dr. Franca Rojas    Venous insufficiency of leg     Vocal cord paresis 05/11/2016    under care of ENT:Dr. Daquan Jernigan. s[facility]p [facility] Wears glasses        Past Surgical History:        Procedure Laterality Date    CATARACT REMOVAL      COLONOSCOPY  2002;2011    COLONOSCOPY  8/13/2021    COLONOSCOPY DIAGNOSTIC performed by Keenan Guo MD at [address] [facility]"" Right 02/23/2017    Right TKR(knee)    KNEE ARTHROPLASTY Left 7/30/13    LEFT TOTAL KNEE ARTHROPLASTY              KNEE CARTILAGE SURGERY      Left x 2, right x1    LITHOTRIPSY      Kidney stone removal as per urology:Stent removed after 10days    OSTEOTOMY Left     TURP N/A [id number]    TRANSURETHRAL RESECTION OF PROSTATE    UPPER GASTROINTESTINAL ENDOSCOPY  5/07       Social History:    Social History     Tobacco Use    Smoking status: Never    Smokeless tobacco: Never   Substance Use Topics    Alcohol use: Not Currently Counseling given: Not Answered      Vital Signs (Current):   Vitals:    09/08/22 1426 09/12/22 1318   BP:  (!) 157/80   Pulse:  (!) 45   Temp:  98 °F (36.7 °C)   TempSrc:  Temporal   Weight: 151 lb (68.5 kg)    Height: 5' 8\" (1.727 m)                                               BP Readings from Last 3 Encounters:   09/12/22 (!) 157/80   09/08/22 120/70   09/06/22 (!) 169/90       NPO Status:                                                                                 BMI:   Wt Readings from Last 3 Encounters:   09/08/22 151 lb (68.5 kg)   09/08/22 151 lb 9.6 oz (68.8 kg)   09/06/22 151 lb (68.5 kg)     Body mass index is 22.96 kg/m². CBC:   Lab Results   Component Value Date/Time    WBC 7.4 09/08/2022 11:14 AM    RBC 4.12 09/08/2022 11:14 AM    HGB 13.3 09/08/2022 11:14 AM    HCT 40.1 09/08/2022 11:14 AM    MCV 97.3 09/08/2022 11:14 AM    RDW 14.5 09/08/2022 11:14 AM     09/08/2022 11:14 AM       CMP:   Lab Results   Component Value Date/Time     09/08/2022 11:14 AM    K 4.0 09/08/2022 11:14 AM    K 3.8 08/12/2021 11:23 AM     09/08/2022 11:14 AM    CO2 26 09/08/2022 11:14 AM    BUN 16 09/08/2022 11:14 AM    CREATININE 1.0 09/08/2022 11:14 AM    GFRAA >60 09/08/2022 11:14 AM    GFRAA >60 11/01/2012 04:46 PM    AGRATIO 1.7 09/08/2022 11:14 AM    LABGLOM >60 09/08/2022 11:14 AM    GLUCOSE 99 09/08/2022 11:14 AM    PROT 7.0 09/08/2022 11:14 AM    PROT 7.0 11/01/2012 04:46 PM    CALCIUM 9.2 09/08/2022 11:14 AM    BILITOT 0.5 09/08/2022 11:14 AM    ALKPHOS 84 09/08/2022 11:14 AM    AST 14 09/08/2022 11:14 AM    ALT <5 09/08/2022 11:14 AM       POC Tests: No results for input(s): POCGLU, POCNA, POCK, POCCL, POCBUN, POCHEMO, POCHCT in the last 72 hours.     Coags:   Lab Results   Component Value Date/Time    PROTIME 11.7 08/12/2021 11:23 AM    INR 1.03 08/12/2021 11:23 AM    APTT 33.5 05/18/2021 01:07 PM       HCG (If Applicable): No results found for: PREGTESTUR, PREGSERUM, HCG, HCGQUANT     ABGs: No results found for: PHART, PO2ART, QUN3ZLH, SAG9PZM, BEART, G1GYKWUA     Type & Screen (If Applicable):  No results found for: LABABO, LABRH    Drug/Infectious Status (If Applicable):  No results found for: HIV, HEPCAB    COVID-19 Screening (If Applicable): No results found for: COVID19        Anesthesia Evaluation  Patient summary reviewed and Nursing notes reviewed no history of anesthetic complications:   Airway: Mallampati: II  TM distance: >3 FB   Neck ROM: full  Mouth opening: > = 3 FB   Dental: normal exam         Pulmonary:   (+) decreased breath sounds                            Cardiovascular:  Exercise tolerance: poor (<4 METS),   (+) hypertension:, angina:, CAD:, dysrhythmias:, PINA: no interval change,         Rhythm: regular  Rate: normal           Beta Blocker:  Not on Beta Blocker         Neuro/Psych:   (+) CVA:, neuromuscular disease: Parkinson's disease, psychiatric history:            GI/Hepatic/Renal:   (+) hiatal hernia, GERD:,           Endo/Other:                     Abdominal:         (-) obese Abdomen: soft. Vascular: Other Findings:           Anesthesia Plan      general     ASA 3       Induction: intravenous. MIPS: Postoperative opioids intended and Prophylactic antiemetics administered. Anesthetic plan and risks discussed with patient. Plan discussed with CRNA.                     Kenyon Coreas DO   9/12/2022

## 2022-09-12 NOTE — PROGRESS NOTES
Dr. Rachel Shetty aware of low HR 43-45.  Also ok'd pt to take his    Carbidopa-Levodopa ER 48. MG CPCR  po

## 2022-09-12 NOTE — H&P
Lisa Mercer    [id number]    [facility] Same Day Surgery Update H & P  Department of General Surgery   Surgical Service   Pre-operative History and Physical  Last H & P within the last 30 days. DIAGNOSIS:   Open fracture of nasal bone, initial encounter [S02. 2XXB]  Nasal obstruction [J34.89]  Deviated nasal septum [J34.2]  Acquired nasal deformity [M95.0]    Procedure(s):  CLOSED REDUCTION NASAL FRACTURE AND SEPTOPLASTY  . History obtained from: Patient interview and EHR      HISTORY OF PRESENT ILLNESS:   The patient is a 68 y.o. male with open nasal fracture after a  fall presents today for the above procedure. Illness Screening: Patient denies fever, chills, worsening cough, or close contact with sick individuals. Past Medical History:        Diagnosis Date    Anxiety     PCP in [place]:Dr. Phong Sanchez.    Arthritis     Arthritis of hand, right 06/20/2012    Sneed esophagus     Dr. Barbara Don. EGD:1/17/2012. Next EGD due in 3yrs=1/2015    Sneed esophagus     Under care of GI    Bilateral carotid artery stenosis <50% 04/30/2014    BPH (benign prostatic hypertrophy)     Dr. Romero Loop. PSA per urology.     CAD (coronary artery disease)     under care of cards:Dr. Jyothi Mendoza    Carotid stenosis     Chest pain     pt states he no chest pain in 5 yrs, panic attack    Chronic back pain 01/26/2012    Chronic back pain     under care of ortho spine:Dr. Sweeney    Chronic kidney disease     Constipation 08/06/2013    Degenerative arthritis of thoracic spine 01/26/2012    Degenerative cervical disc 01/26/2012    Depression with anxiety 11/15/2011    Klonopin per neurologist(Dr. Dilcia Rodriguez)    Diverticulosis 01/17/2012    colonoscopy    Elevated PSA     8/21/14;5/2013:under care of Dr. Beltran/[place]:Urology group    Erectile dysfunction 06/13/2017    Essential hypertension, benign 06/20/2012    cardiologist:Dr. Sandor Bryan    GERD (gastroesophageal reflux disease)     Hemorrhoid     Hiatal hernia small    Hyperlipidemia     Impaired fasting glucose 05/19/2014    Osteopenia per CXR 05/19/2014    Parkinson disease ([facility] [address]     Dr. Ana Lilia Miller Neurology    Renal stone 04/2012    4 mm distal left ureteral calculus:Dr. Beltran:urologist    RLS (restless legs syndrome)     S/P colonoscopy 2011;5/20/19    Dr. Xu Freire per pt' next is in 10yrs-2021. 5/20/19(Dr. Elaine)    S/P endoscopy 01/2012    Dr. Cely Boone acute changes:+Barrets:next in 3yrs 1/2015    Sigmoidoscopy exam 01/17/2012    Dr. Lama:No acute changes. Next in 5yrs=1/2017    Therapeutic drug monitoring 05/14/2015    Consistent OARRS report on 5/14/15;8/4/15    Thoracic spondylosis     under care of ortho spine:Dr. Tiffany Muse    Venous insufficiency of leg     Vocal cord paresis 05/11/2016    under care of ENT:Dr. Yahaira Hassan. s/p vocal cord augmentation 6/2016 at [address]    Wears glasses      Past Surgical History:        Procedure Laterality Date    CATARACT REMOVAL      COLONOSCOPY  2002;2011    COLONOSCOPY  8/13/2021    COLONOSCOPY DIAGNOSTIC performed by Desiree Bradford MD [facility] [address] Right 02/23/2017    Right TKR(knee)    KNEE ARTHROPLASTY Left 7/30/13    LEFT TOTAL KNEE ARTHROPLASTY              KNEE CARTILAGE SURGERY      Left x 2, right x1    LITHOTRIPSY      Kidney stone removal as per urology:Stent removed after 10days    OSTEOTOMY Left     TURP N/A [id number]    TRANSURETHRAL RESECTION OF PROSTATE    UPPER GASTROINTESTINAL ENDOSCOPY  5/07       Medications Prior to Admission:      Prior to Admission medications    Medication Sig Start Date End Date Taking?  Authorizing Provider   TURMERIC PO Take 5 mg by mouth daily   Yes Historical Provider, MD   Amantadine HCl ER (GOCOVRI) 137 MG CP24 1 po qhs 9/8/22   Rahat Kruse MD   Coenzyme Q10 300 MG CAPS 1 po qd 9/8/22   Rahat Kruse MD   oxybutynin (DITROPAN-XL) 10 MG extended release tablet daily 8/19/22   Historical Provider, MD   donepezil (ARICEPT) 10 MG tablet Take 1 tablet by mouth nightly 8/25/22   Naya Staton MD   acetaminophen (TYLENOL) 325 MG tablet Take 1 tablet by mouth every 6 hours as needed for Pain 7/30/22   Lincoln Zuniga MD   simvastatin (ZOCOR) 10 MG tablet TAKE 1 TABLET EVERY NIGHT 3/7/22   Mahendra Flores MD   imipramine (TOFRANIL) 25 MG tablet TAKE 1 TABLET NIGHTLY 11/30/21   Dennie Crofts, APRN - CNP   Melatonin 10 MG TABS Take by mouth nightly as needed    Historical Provider, MD   Carbidopa-Levodopa ER 48. MG CPCR Take 1 tablet by mouth at bedtime 2 AT BEDTIME    Historical Provider, MD   lidocaine (LIDODERM) 5 % Place 1 patch onto the skin daily 12 hours on, 12 hours off. Patient taking differently: Place 1 patch onto the skin as needed 12 hours on, 12 hours off. 5/24/21   Monica Zarate MD   linaclotide (LINZESS) 290 MCG CAPS capsule Take 290 mcg by mouth every morning (before breakfast)    Historical Provider, MD   clonazePAM (KLONOPIN) 0.5 MG tablet Take 1 mg by mouth nightly     Historical Provider, MD   carbidopa-levodopa (SINEMET)  MG per tablet Take 2.5 tablets by mouth 5 times daily 0800, 1200, 1600 and 2000.    (with Ludivina tSarks)    Historical Provider, MD   Cholecalciferol (VITAMIN D3) 2000 UNITS CAPS   Take 1 capsule by mouth daily     Historical Provider, MD   aspirin 81 MG EC tablet   Take 81 mg by mouth three times a week On Mondays, Wednesdays and Fridays    Historical Provider, MD   Polyethylene Glycol 3350 (MIRALAX PO)   Take 17 g by mouth daily as needed     Historical Provider, MD   pantoprazole (PROTONIX) 40 MG tablet   Take 40 mg by mouth daily     Historical Provider, MD         Allergies:  Levofloxacin and Levofloxacin    PHYSICAL EXAM:      BP (!) 157/80   Pulse (!) 45   Temp 98 °F (36.7 °C) (Temporal)   Ht 5' 8\" (1.727 m)   Wt 151 lb (68.5 kg)   BMI 22.96 kg/m²      Airway:  Airway patent with no audible stridor    Heart:  Regular rate and rhythm, No murmur noted    Lungs:  No increased work of breathing, good air exchange, clear to auscultation bilaterally, no crackles or wheezing    Abdomen:  Soft, non-distended, non-tender, no rebound tenderness or guarding, and no masses palpated    ASSESSMENT AND PLAN     Patient is a 68 y.o. male with above specified procedure planned. 1.  The patients history and physical was obtained and signed off by the pre-admission testing department. Patient seen and focused exam done today- no new changes since last physical exam on 9/8/22     2. Access to ancillary services are available per request of the provider.     ROGE Ledesma - CNP     9/12/2022

## 2022-09-20 ENCOUNTER — OFFICE VISIT (OUTPATIENT)
Dept: ENT CLINIC | Age: 77
End: 2022-09-20

## 2022-09-20 VITALS — BODY MASS INDEX: 22.88 KG/M2 | HEIGHT: 68 IN | WEIGHT: 151 LBS

## 2022-09-20 DIAGNOSIS — M95.0 ACQUIRED NASAL DEFORMITY: ICD-10-CM

## 2022-09-20 DIAGNOSIS — S02.2XXD OPEN FRACTURE OF NASAL BONE WITH ROUTINE HEALING, SUBSEQUENT ENCOUNTER: ICD-10-CM

## 2022-09-20 DIAGNOSIS — J34.2 DNS (DEVIATED NASAL SEPTUM): Primary | ICD-10-CM

## 2022-09-20 PROCEDURE — 99024 POSTOP FOLLOW-UP VISIT: CPT | Performed by: OTOLARYNGOLOGY

## 2022-09-20 NOTE — PROGRESS NOTES
[facility]  [address], [address]  [employer], [address]  P: [phone]  F: [phone]       Patient     Anali Watson  1945    ChiefComplaint     Chief Complaint   Patient presents with    Post-Op Check     Patient is here today for his post-op and splint removal, he is doing well with no complaints or concerns       History of Present Illness     Anali Watson is a pleasant 68 y.o. male here for 1 week postop visit for close reduction nasal fracture, septoplasty. Overall doing well. [place] splint fell off over the weekend. No significant issues. Past Medical History     Past Medical History:   Diagnosis Date    Anxiety     PCP in [place]:Dr. Anna Duong.    Arthritis     Arthritis of hand, right 06/20/2012    Sneed esophagus     Dr. Cathryn Owen. EGD:1/17/2012. Next EGD due in 3yrs=1/2015    Sneed esophagus     Under care of GI    Bilateral carotid artery stenosis <50% 04/30/2014    BPH (benign prostatic hypertrophy)     Dr. Dagoberto Roe. PSA per urology.     CAD (coronary artery disease)     under care of cards:Dr. Jimena Shaffer    Carotid stenosis     Chest pain     pt states he no chest pain in 5 yrs, panic attack    Chronic back pain 01/26/2012    Chronic back pain     under care of ortho spine:Dr. Sweeney    Chronic kidney disease     Constipation 08/06/2013    Degenerative arthritis of thoracic spine 01/26/2012    Degenerative cervical disc 01/26/2012    Depression with anxiety 11/15/2011    Klonopin per neurologist(Dr. Yasmin Son)    Diverticulosis 01/17/2012    colonoscopy    Elevated PSA     8/21/14;5/2013:under care of Dr. Beltran/[place]:Urology group    Erectile dysfunction 06/13/2017    Essential hypertension, benign 06/20/2012    cardiologist:Dr. Lesly Smith    GERD (gastroesophageal reflux disease)     Hemorrhoid     Hiatal hernia     small    Hyperlipidemia     Impaired fasting glucose 05/19/2014    Osteopenia per CXR 05/19/2014    Parkinson disease ([facility] [address]     Dr. Evi Candelaria Neurology    Renal stone 04/2012    4 mm distal left ureteral calculus:Dr. Beltran:urologist    RLS (restless legs syndrome)     S/P colonoscopy 2011;5/20/19    Dr. Xu Freire per pt' next is in 10yrs-2021. 5/20/19(Dr. Elaine)    S/P endoscopy 01/2012    Dr. Cely Boone acute changes:+Barrets:next in 3yrs 1/2015    Sigmoidoscopy exam 01/17/2012    Dr. Lama:No acute changes. Next in 5yrs=1/2017    Therapeutic drug monitoring 05/14/2015    Consistent OARRS report on 5/14/15;8/4/15    Thoracic spondylosis     under care of ortho spine:Dr. Tiffany Muse    Venous insufficiency of leg     Vocal cord paresis 05/11/2016    under care of ENT:Dr. Yahaira Hassan. s/p vocal cord augmentation 6/2016 at [address]    Wears glasses        Past Surgical History     Past Surgical History:   Procedure Laterality Date    CATARACT REMOVAL      COLONOSCOPY  2002;2011    COLONOSCOPY  08/13/2021    COLONOSCOPY DIAGNOSTIC performed by Desiree Bradford MD at [place] [id number] Right 02/23/2017    Right TKR(knee)    KNEE ARTHROPLASTY Left 07/30/2013    LEFT TOTAL KNEE ARTHROPLASTY              KNEE CARTILAGE SURGERY      Left x 2, right x1    LITHOTRIPSY      Kidney stone removal as per urology:Stent removed after 10days    NASAL FRACTURE SURGERY N/A 9/12/2022    CLOSED REDUCTION NASAL FRACTURE AND SEPTOPLASTY performed by Ra Claire DO at [address]  09/12/2022    OSTEOTOMY Left     SEPTOPLASTY N/A 9/12/2022    .  performed by Ra Claire DO at [address]    TURP N/A 11/01/2013    TRANSURETHRAL RESECTION OF PROSTATE    UPPER GASTROINTESTINAL ENDOSCOPY  05/2007       Family History     Family History   Problem Relation Age of Onset    Cancer Sister 68        breast     Heart Disease Sister     Kidney Disease Father 61    Alcohol Abuse Father     Coronary Art Dis Mother 80    Coronary Art Dis Brother 77       Social History     Social History     Socioeconomic History    Marital status:      Spouse name: Not on file    Number of children: 2    Years of education: Not on file    Highest education level: Not on file   Occupational History    Occupation: retired    Tobacco Use    Smoking status: Never    Smokeless tobacco: Never   Vaping Use    Vaping Use: Never used   Substance and Sexual Activity    Alcohol use: Not Currently    Drug use: No    Sexual activity: Yes     Partners: Female   Other Topics Concern    Not on file   Social History Narrative    Not on file     Social Determinants of Health     Financial Resource Strain: Low Risk     Difficulty of Paying Living Expenses: Not hard at all   Food Insecurity: No Food Insecurity    Worried About Running Out of Food in the Last Year: Never true    Ran Out of Food in the Last Year: Never true   Transportation Needs: Not on file   Physical Activity: Not on file   Stress: Not on file   Social Connections: Not on file   Intimate Partner Violence: Not on file   Housing Stability: Not on file       Allergies     Allergies   Allergen Reactions    Levofloxacin Swelling     lips swell    Levofloxacin Swelling       Medications     Current Outpatient Medications   Medication Sig Dispense Refill    Amantadine HCl ER (GOCOVRI) 137 MG CP24 1 po qhs 90 capsule 1    Coenzyme Q10 300 MG CAPS 1 po qd 90 capsule 1    TURMERIC PO Take 5 mg by mouth daily      oxybutynin (DITROPAN-XL) 10 MG extended release tablet daily      donepezil (ARICEPT) 10 MG tablet Take 1 tablet by mouth nightly 30 tablet 5    acetaminophen (TYLENOL) 325 MG tablet Take 1 tablet by mouth every 6 hours as needed for Pain 60 tablet 0    simvastatin (ZOCOR) 10 MG tablet TAKE 1 TABLET EVERY NIGHT 90 tablet 3    imipramine (TOFRANIL) 25 MG tablet TAKE 1 TABLET NIGHTLY 90 tablet 0    Melatonin 10 MG TABS Take by mouth nightly as needed      Carbidopa-Levodopa ER 48. MG CPCR Take 1 tablet by mouth at bedtime 2 AT BEDTIME      lidocaine (LIDODERM) 5 % Place 1 patch onto the skin daily 12 hours on, 12 hours off. (Patient taking differently: Place 1 patch onto the skin as needed 12 hours on, 12 hours off.) 30 patch 0    linaclotide (LINZESS) 290 MCG CAPS capsule Take 290 mcg by mouth every morning (before breakfast)      clonazePAM (KLONOPIN) 0.5 MG tablet Take 1 mg by mouth nightly       carbidopa-levodopa (SINEMET)  MG per tablet Take 2.5 tablets by mouth 5 times daily 0800, 1200, 1600 and 2000. (with Rytary)      Cholecalciferol (VITAMIN D3) 2000 UNITS CAPS Take 1 capsule by mouth daily      aspirin 81 MG EC tablet   Take 81 mg by mouth three times a week On Mondays, Wednesdays and Fridays      Polyethylene Glycol 3350 (MIRALAX PO)   Take 17 g by mouth daily as needed       pantoprazole (PROTONIX) 40 MG tablet   Take 40 mg by mouth daily        No current facility-administered medications for this visit. Review of Systems     Review of Systems      PhysicalExam     There were no vitals filed for this visit. Physical Exam  [place] splints removed. Nasal septum midline. No perforation or hematoma. Procedure           Assessment and Plan     1. DNS (deviated nasal septum)  Patient doing well status post septoplasty and closed reduction nasal fracture. Recommend saline for 1-2 more weeks. 1 more week of activity restriction. Follow-up in 2 months. 2. Acquired nasal deformity      3. Open fracture of nasal bone with routine healing, subsequent encounter      Return in about 2 months (around 11/20/2022). Portions of this note were dictated using Dragon.  There may be linguistic errors secondary to the use of this program.

## 2022-10-07 ENCOUNTER — HOSPITAL ENCOUNTER (OUTPATIENT)
Dept: GENERAL RADIOLOGY | Age: 77
Discharge: HOME | End: 2022-10-07
Payer: MEDICARE

## 2022-10-07 DIAGNOSIS — R13.10 DYSPHAGIA, UNSPECIFIED TYPE: ICD-10-CM

## 2022-10-07 PROCEDURE — 74220 X-RAY XM ESOPHAGUS 1CNTRST: CPT

## 2022-10-12 ENCOUNTER — OFFICE VISIT (OUTPATIENT)
Dept: CARDIOLOGY CLINIC | Age: 77
End: 2022-10-12

## 2022-10-12 DIAGNOSIS — R00.1 BRADYCARDIA: ICD-10-CM

## 2022-10-12 DIAGNOSIS — I10 ESSENTIAL HYPERTENSION: ICD-10-CM

## 2022-10-12 DIAGNOSIS — I25.10 CAD IN NATIVE ARTERY: Primary | ICD-10-CM

## 2022-10-12 DIAGNOSIS — R07.9 CHEST PAIN, UNSPECIFIED TYPE: ICD-10-CM

## 2022-10-12 PROCEDURE — G8428 CUR MEDS NOT DOCUMENT: HCPCS | Performed by: INTERNAL MEDICINE

## 2022-10-12 PROCEDURE — 1036F TOBACCO NON-USER: CPT | Performed by: INTERNAL MEDICINE

## 2022-10-12 PROCEDURE — G8484 FLU IMMUNIZE NO ADMIN: HCPCS | Performed by: INTERNAL MEDICINE

## 2022-10-12 PROCEDURE — 99214 OFFICE O/P EST MOD 30 MIN: CPT | Performed by: INTERNAL MEDICINE

## 2022-10-12 PROCEDURE — 1123F ACP DISCUSS/DSCN MKR DOCD: CPT | Performed by: INTERNAL MEDICINE

## 2022-10-12 PROCEDURE — 93242 EXT ECG>48HR<7D RECORDING: CPT | Performed by: INTERNAL MEDICINE

## 2022-10-12 PROCEDURE — G8420 CALC BMI NORM PARAMETERS: HCPCS | Performed by: INTERNAL MEDICINE

## 2022-10-12 NOTE — PROGRESS NOTES
Subjective:      Patient ID: Matias Pendleton is a 68 y.o. male. HPI Hemal Rosas is being seen for  follow up CAD/angina/HTN. Bothered by [employer]. No sob. No chest pain. No tachycardia. No syncope. No pnd or orthopnea. No edema. Wt stable. BP ok at home. Past Medical History:   Diagnosis Date    Anxiety     PCP in [place]:Dr. Acacia Ingram.    Arthritis     Arthritis of hand, right 06/20/2012    Sneed esophagus     Dr. Rajat Amor. EGD:1/17/2012. Next EGD due in 3yrs=1/2015    Sneed esophagus     Under care of GI    Bilateral carotid artery stenosis <50% 04/30/2014    BPH (benign prostatic hypertrophy)     Dr. Shakira Crawford. PSA per urology.     CAD (coronary artery disease)     under care of cards:Dr. Suzi Tsai    Carotid stenosis     Chest pain     pt states he no chest pain in 5 yrs, panic attack    Chronic back pain 01/26/2012    Chronic back pain     under care of ortho spine:Dr. Sweeney    Chronic kidney disease     Constipation 08/06/2013    Degenerative arthritis of thoracic spine 01/26/2012    Degenerative cervical disc 01/26/2012    Depression with anxiety 11/15/2011    Klonopin per neurologist(Dr. Anahy Conway)    Diverticulosis 01/17/2012    colonoscopy    Elevated PSA     8/21/14;5/2013:under care of Dr. Beltran/[place]:Urology group    Erectile dysfunction 06/13/2017    Essential hypertension, benign 06/20/2012    cardiologist:Dr. Steven Contreras    GERD (gastroesophageal reflux disease)     Hemorrhoid     Hiatal hernia     small    Hyperlipidemia     Impaired fasting glucose 05/19/2014    Osteopenia per CXR 05/19/2014    Parkinson disease ([facility] [address]     Dr. Dionicio Ordonez Neurology    Renal stone 04/2012    4 mm distal left ureteral calculus:Dr. Beltran:urologist    RLS (restless legs syndrome)     S/P colonoscopy 2011;5/20/19    Dr. Edu Roberts per pt' next is in 10yrs-2021. 5/20/19(Dr. Elaine)    S/P endoscopy 01/2012    Dr. Jennifer Larios acute changes:+Barrets:next in 3yrs 1/2015    Sigmoidoscopy exam 01/17/2012     Mangles:No acute changes. Next in 5yrs=1/2017    Therapeutic drug monitoring 05/14/2015    Consistent OARRS report on 5/14/15;8/4/15    Thoracic spondylosis     under care of ortho spine:Dr. Evelio Constantino    Venous insufficiency of leg     Vocal cord paresis 05/11/2016    under care of ENT:Dr. Gera Drew. s/p vocal cord augmentation 6/2016 [address]    Wears glasses      Past Surgical History:   Procedure Laterality Date    CATARACT REMOVAL      COLONOSCOPY  2002;2011    COLONOSCOPY  08/13/2021    COLONOSCOPY DIAGNOSTIC performed by Francisca Ortiz MD at [facility] Right 02/23/2017    Right TKR(knee)    KNEE ARTHROPLASTY Left 07/30/2013    LEFT TOTAL KNEE ARTHROPLASTY              KNEE CARTILAGE SURGERY      Left x 2, right x1    LITHOTRIPSY      Kidney stone removal as per urology:Stent removed after 10days    NASAL FRACTURE SURGERY N/A 9/12/2022    CLOSED REDUCTION NASAL FRACTURE AND SEPTOPLASTY performed by Yvonne Lerma DO at [address]  09/12/2022    OSTEOTOMY Left     SEPTOPLASTY N/A 9/12/2022    .  performed by Yvonne Lerma DO at [address]    TURP N/A 11/01/2013    TRANSURETHRAL RESECTION OF PROSTATE    UPPER GASTROINTESTINAL ENDOSCOPY  05/2007       Allergies   Allergen Reactions    Levofloxacin Swelling     lips swell    Levofloxacin Swelling        Social History     Socioeconomic History    Marital status:      Spouse name: Not on file    Number of children: 2    Years of education: Not on file    Highest education level: Not on file   Occupational History    Occupation: retired    Tobacco Use    Smoking status: Never    Smokeless tobacco: Never   Vaping Use    Vaping Use: Never used   Substance and Sexual Activity    Alcohol use: Not Currently    Drug use: No    Sexual activity: Yes     Partners: Female   Other Topics Concern    Not on file   Social History Narrative    Not on file     Social Determinants of Health     Financial Resource Strain: Low Risk     Difficulty of Paying Living Expenses: Not hard at all   Food Insecurity: No Food Insecurity    Worried About Running Out of Food in the Last Year: Never true    Ran Out of Food in the Last Year: Never true   Transportation Needs: Not on file   Physical Activity: Not on file   Stress: Not on file   Social Connections: Not on file   Intimate Partner Violence: Not on file   Housing Stability: Not on file              Current Outpatient Medications   Medication Sig Dispense Refill    Amantadine HCl ER (GOCOVRI) 137 MG CP24 1 po qhs 90 capsule 1    Coenzyme Q10 300 MG CAPS 1 po qd 90 capsule 1    TURMERIC PO Take 5 mg by mouth daily      oxybutynin (DITROPAN-XL) 10 MG extended release tablet daily      donepezil (ARICEPT) 10 MG tablet Take 1 tablet by mouth nightly 30 tablet 5    acetaminophen (TYLENOL) 325 MG tablet Take 1 tablet by mouth every 6 hours as needed for Pain 60 tablet 0    simvastatin (ZOCOR) 10 MG tablet TAKE 1 TABLET EVERY NIGHT 90 tablet 3    imipramine (TOFRANIL) 25 MG tablet TAKE 1 TABLET NIGHTLY 90 tablet 0    Melatonin 10 MG TABS Take by mouth nightly as needed      Carbidopa-Levodopa ER 48. MG CPCR Take 1 tablet by mouth at bedtime 2 AT BEDTIME      lidocaine (LIDODERM) 5 % Place 1 patch onto the skin daily 12 hours on, 12 hours off. (Patient taking differently: Place 1 patch onto the skin as needed 12 hours on, 12 hours off.) 30 patch 0    linaclotide (LINZESS) 290 MCG CAPS capsule Take 290 mcg by mouth every morning (before breakfast)      clonazePAM (KLONOPIN) 0.5 MG tablet Take 1 mg by mouth nightly       carbidopa-levodopa (SINEMET)  MG per tablet Take 2.5 tablets by mouth 5 times daily 0800, 1200, 1600 and 2000.    (with Rytary)      Cholecalciferol (VITAMIN D3) 2000 UNITS CAPS Take 1 capsule by mouth daily      aspirin 81 MG EC tablet   Take 81 mg by mouth three times a week On Mondays, Wednesdays and Fridays      Polyethylene Glycol 3350 (MIRALAX PO)   Take 17 g by mouth daily as needed       pantoprazole (PROTONIX) 40 MG tablet   Take 40 mg by mouth daily        No current facility-administered medications for this visit. BP 128l/70  HR 58      Wt 148    Review of Systems   Constitutional: Negative for activity change. Some  fatigue. Respiratory: Negative for apnea, cough, choking, Has chest tightness and shortness of breath. Cardiovascular: Negative for chest pain, palpitations and leg swelling. No PND or orthopnea. No tachycardia. Gastrointestinal: Negative for abdominal distention. Musculoskeletal: Negative for myalgias. Neurological: Negative for light-headedness. Negative for dizziness and syncope. Psychiatric/Behavioral: Negative for behavioral problems, confusion and agitation. All other systems reviewed negative as done. Objective:   Physical Exam   Constitutional: He is oriented to person, place, and time. He appears well-developed and well-nourished. No distress. HENT:   Head: Normocephalic and atraumatic. Eyes: Conjunctivae and EOM are normal. Right eye exhibits no discharge. Left eye exhibits no discharge. Neck: Normal range of motion. Neck supple. No JVD present. Cardiovascular: Normal rate, regular rhythm, S1 normal, S2 normal and normal heart sounds. Exam reveals no gallop. No murmur heard. Pulses:       Radial pulses are 2+ on the right side, and 2+ on the left side. Pulmonary/Chest: Effort normal and breath sounds normal. No respiratory distress. He has no wheezes. He has no rales. Abdominal: Soft. Bowel sounds are normal. There is no tenderness. Musculoskeletal: Normal range of motion. He exhibits no edema. Neurological: He is alert and oriented to person, place, and time. Skin: Skin is warm and dry. Psychiatric: He has a normal mood and affect. His behavior is normal. Thought content normal.       Assessment:       Diagnosis Orders   1. CAD in native artery        2. Essential hypertension        3.

## 2022-10-17 ENCOUNTER — HOSPITAL ENCOUNTER (OUTPATIENT)
Dept: VASCULAR LAB | Age: 77
Discharge: HOME | End: 2022-10-17
Payer: MEDICARE

## 2022-10-17 DIAGNOSIS — I65.23 BILATERAL CAROTID ARTERY STENOSIS: ICD-10-CM

## 2022-10-17 PROCEDURE — 93880 EXTRACRANIAL BILAT STUDY: CPT

## 2022-10-18 ENCOUNTER — TELEPHONE (OUTPATIENT)
Dept: FAMILY MEDICINE CLINIC | Age: 77
End: 2022-10-18

## 2022-10-18 NOTE — TELEPHONE ENCOUNTER
----- Message from [facility] sent at 10/18/2022  9:44 AM EDT -----  Subject: Referral Request    Reason for referral request? TMJ started last night Pain in jaw air, up   high while he was trying to eat and a clicking noise  Provider patient wants to be referred to(if known):     Provider Phone Number(if known): Additional Information for Provider? [facility] [place] fax   #[phone].  Please call pt and let him know if this referral will be   sent over  ---------------------------------------------------------------------------  --------------  [address]    [phone]; OK to leave message on voicemail  ---------------------------------------------------------------------------  --------------

## 2022-10-19 ENCOUNTER — OFFICE VISIT (OUTPATIENT)
Dept: FAMILY MEDICINE CLINIC | Age: 77
End: 2022-10-19
Payer: MEDICARE

## 2022-10-19 VITALS
HEIGHT: 68 IN | BODY MASS INDEX: 22.4 KG/M2 | DIASTOLIC BLOOD PRESSURE: 61 MMHG | OXYGEN SATURATION: 98 % | WEIGHT: 147.8 LBS | HEART RATE: 72 BPM | SYSTOLIC BLOOD PRESSURE: 96 MMHG

## 2022-10-19 DIAGNOSIS — R68.84 JAW PAIN: ICD-10-CM

## 2022-10-19 DIAGNOSIS — M26.629 TMJ SYNDROME: Primary | ICD-10-CM

## 2022-10-19 PROCEDURE — G8420 CALC BMI NORM PARAMETERS: HCPCS | Performed by: FAMILY MEDICINE

## 2022-10-19 PROCEDURE — 99214 OFFICE O/P EST MOD 30 MIN: CPT | Performed by: FAMILY MEDICINE

## 2022-10-19 PROCEDURE — G8427 DOCREV CUR MEDS BY ELIG CLIN: HCPCS | Performed by: FAMILY MEDICINE

## 2022-10-19 PROCEDURE — 1036F TOBACCO NON-USER: CPT | Performed by: FAMILY MEDICINE

## 2022-10-19 PROCEDURE — 1123F ACP DISCUSS/DSCN MKR DOCD: CPT | Performed by: FAMILY MEDICINE

## 2022-10-19 PROCEDURE — G8484 FLU IMMUNIZE NO ADMIN: HCPCS | Performed by: FAMILY MEDICINE

## 2022-10-19 RX ORDER — METHYLPREDNISOLONE 4 MG/1
TABLET ORAL
Qty: 1 KIT | Refills: 0 | Status: SHIPPED | OUTPATIENT
Start: 2022-10-19 | End: 2022-10-25

## 2022-10-19 NOTE — TELEPHONE ENCOUNTER
Has he had Physical Therapy before and had prior diagnosis of TMJ? He was a new patient to me in 8/22. Usually dentist would be involved for bite plate/ guard or I often refer to  - Dr. Kelton Krueger or Dr. Pankaj Leiva . An appointment is preferred to discuss. He can be worked in near lunch if needed.

## 2022-10-25 PROCEDURE — 93244 EXT ECG>48HR<7D REV&INTERPJ: CPT | Performed by: INTERNAL MEDICINE

## 2022-10-26 DIAGNOSIS — R42 DIZZINESS AND GIDDINESS: Primary | ICD-10-CM

## 2022-11-03 ENCOUNTER — TELEPHONE (OUTPATIENT)
Dept: CARDIOLOGY CLINIC | Age: 77
End: 2022-11-03

## 2022-11-03 NOTE — TELEPHONE ENCOUNTER
Left detailed message for patient; informed of 2 day CAM results. Advised to keep followup appt w/dr betancourt (4/19). Requested call back with questions.

## 2022-11-08 ENCOUNTER — OFFICE VISIT (OUTPATIENT)
Dept: ENT CLINIC | Age: 77
End: 2022-11-08

## 2022-11-08 VITALS
HEART RATE: 53 BPM | WEIGHT: 147 LBS | BODY MASS INDEX: 22.28 KG/M2 | DIASTOLIC BLOOD PRESSURE: 78 MMHG | SYSTOLIC BLOOD PRESSURE: 132 MMHG | HEIGHT: 68 IN

## 2022-11-08 DIAGNOSIS — J34.2 DNS (DEVIATED NASAL SEPTUM): ICD-10-CM

## 2022-11-08 DIAGNOSIS — M95.0 ACQUIRED NASAL DEFORMITY: ICD-10-CM

## 2022-11-08 DIAGNOSIS — S02.2XXD OPEN FRACTURE OF NASAL BONE WITH ROUTINE HEALING, SUBSEQUENT ENCOUNTER: Primary | ICD-10-CM

## 2022-11-08 PROCEDURE — 99024 POSTOP FOLLOW-UP VISIT: CPT | Performed by: OTOLARYNGOLOGY

## 2022-11-08 NOTE — PROGRESS NOTES
[facility]  [address] [address] [facility], [address] [place] [address], [address] [place] [address] Ave  P: [phone]  F: [phone]       Patient     Katherine Ramirez  1945    ChiefComplaint     Chief Complaint   Patient presents with    Follow-up     Patient is here today for his 2 month post-op visit, he is doing fine but today he is a little off balance       History of Present Illness     Katherine Ramirez is a pleasant 68 y.o. male here for 2-month postop visit for close reduction nasal fracture and septoplasty. Overall doing well. Breathing improved. He does have some persistent deformity. Past Medical History     Past Medical History:   Diagnosis Date    Anxiety     PCP in [place]:Dr. Markos Rasmussen.    Arthritis     Arthritis of hand, right 06/20/2012    Sneed esophagus     Dr. Aura Piper. EGD:1/17/2012. Next EGD due in 3yrs=1/2015    Sneed esophagus     Under care of GI    Bilateral carotid artery stenosis <50% 04/30/2014    BPH (benign prostatic hypertrophy)     Dr. Iza Macias. PSA per urology.     CAD (coronary artery disease)     under care of cards:Dr. Kayleigh Elizabeth    Carotid stenosis     Chest pain     pt states he no chest pain in 5 yrs, panic attack    Chronic back pain 01/26/2012    Chronic back pain     under care of ortho spine:Dr. Sweeney    Chronic kidney disease     Constipation 08/06/2013    Degenerative arthritis of thoracic spine 01/26/2012    Degenerative cervical disc 01/26/2012    Depression with anxiety 11/15/2011    Klonopin per neurologist(Dr. Quitman Cheadle)    Diverticulosis 01/17/2012    colonoscopy    Elevated PSA     8/21/14;5/2013:under care of Dr. Beltran/[place]:Urology group    Erectile dysfunction 06/13/2017    Essential hypertension, benign 06/20/2012    cardiologist:Dr. Sunshine Mcguire    GERD (gastroesophageal reflux disease)     Hemorrhoid     Hiatal hernia     small    Hyperlipidemia     Impaired fasting glucose 05/19/2014    Osteopenia per CXR 05/19/2014    Parkinson disease ([facility] [address]     Dr. Estelle Quevedo Neurology    Renal stone 04/2012    4 mm distal left ureteral calculus:Dr. Beltran:urologist    RLS (restless legs syndrome)     S/P colonoscopy 2011;5/20/19    Dr. Jairon Ramos per pt' next is in 10yrs-2021. 5/20/19(Dr. Elaine)    S/P endoscopy 01/2012    Dr. Haque Pap acute changes:+Barrets:next in 3yrs 1/2015    Sigmoidoscopy exam 01/17/2012    Dr. Lama:No acute changes. Next in 5yrs=1/2017    Therapeutic drug monitoring 05/14/2015    Consistent OARRS report on 5/14/15;8/4/15    Thoracic spondylosis     under care of ortho spine:Dr. Thomason    Venous insufficiency of leg     Vocal cord paresis 05/11/2016    under care of ENT:Dr. Mira Orozco. s/p vocal cord augmentation 6/2016 at [address]    Wears glasses        Past Surgical History     Past Surgical History:   Procedure Laterality Date    CATARACT REMOVAL      COLONOSCOPY  2002;2011    COLONOSCOPY  08/13/2021    COLONOSCOPY DIAGNOSTIC performed by Aleshia Lira MD at [facility] Right 02/23/2017    Right TKR(knee)    KNEE ARTHROPLASTY Left 07/30/2013    LEFT TOTAL KNEE ARTHROPLASTY              KNEE CARTILAGE SURGERY      Left x 2, right x1    LITHOTRIPSY      Kidney stone removal as per urology:Stent removed after 10days    NASAL FRACTURE SURGERY N/A 9/12/2022    CLOSED REDUCTION NASAL FRACTURE AND SEPTOPLASTY performed by Philippe Pérez DO at [address]  09/12/2022    OSTEOTOMY Left     SEPTOPLASTY N/A 9/12/2022    .  performed by Philippe Pérez DO at [address]    TURP N/A 11/01/2013    TRANSURETHRAL RESECTION OF PROSTATE    UPPER GASTROINTESTINAL ENDOSCOPY  05/2007       Family History     Family History   Problem Relation Age of Onset    Cancer Sister 68        breast     Heart Disease Sister     Kidney Disease Father 61    Alcohol Abuse Father     Coronary Art Dis Mother 80    Coronary Art Dis Brother 77       Social History     Social History     Socioeconomic History    Marital status:      Spouse name: Not on file    Number of children: 2    Years of education: Not on file    Highest education level: Not on file   Occupational History    Occupation: retired    Tobacco Use    Smoking status: Never    Smokeless tobacco: Never   Vaping Use    Vaping Use: Never used   Substance and Sexual Activity    Alcohol use: Not Currently    Drug use: No    Sexual activity: Yes     Partners: Female   Other Topics Concern    Not on file   Social History Narrative    Not on file     Social Determinants of Health     Financial Resource Strain: Low Risk     Difficulty of Paying Living Expenses: Not hard at all   Food Insecurity: No Food Insecurity    Worried About Running Out of Food in the Last Year: Never true    Ran Out of Food in the Last Year: Never true   Transportation Needs: Not on file   Physical Activity: Not on file   Stress: Not on file   Social Connections: Not on file   Intimate Partner Violence: Not on file   Housing Stability: Not on file       Allergies     Allergies   Allergen Reactions    Levofloxacin Swelling     lips swell    Levofloxacin Swelling       Medications     Current Outpatient Medications   Medication Sig Dispense Refill    Amantadine HCl ER (GOCOVRI) 137 MG CP24 1 po qhs 90 capsule 1    Coenzyme Q10 300 MG CAPS 1 po qd 90 capsule 1    TURMERIC PO Take 5 mg by mouth daily      oxybutynin (DITROPAN-XL) 10 MG extended release tablet daily      donepezil (ARICEPT) 10 MG tablet Take 1 tablet by mouth nightly 30 tablet 5    acetaminophen (TYLENOL) 325 MG tablet Take 1 tablet by mouth every 6 hours as needed for Pain 60 tablet 0    simvastatin (ZOCOR) 10 MG tablet TAKE 1 TABLET EVERY NIGHT 90 tablet 3    imipramine (TOFRANIL) 25 MG tablet TAKE 1 TABLET NIGHTLY 90 tablet 0    Melatonin 10 MG TABS Take by mouth nightly as needed      Carbidopa-Levodopa ER 48. MG CPCR Take 1 tablet by mouth at bedtime 2 AT BEDTIME      lidocaine (LIDODERM) 5 % Place 1 patch onto the skin daily 12 hours on, 12

## (undated) DEVICE — SOLUTION IV 1000ML 0.9% SOD CHL

## (undated) DEVICE — SPLINT 1524055 DOYLE II AIRWAY SET: Brand: DOYLE II ™

## (undated) DEVICE — INTENDED FOR TISSUE SEPARATION, AND OTHER PROCEDURES THAT REQUIRE A SHARP SURGICAL BLADE TO PUNCTURE OR CUT.: Brand: BARD-PARKER ® CARBON RIB-BACK BLADES

## (undated) DEVICE — SPONGE,NEURO,1"X3",XR,STRL,LF,10/PK: Brand: MEDLINE

## (undated) DEVICE — ELECTROSURGICAL SUCTION COAGULATOR 10FR

## (undated) DEVICE — DRAPE,INSTRUMENT,MAGNETIC,10X16: Brand: MEDLINE

## (undated) DEVICE — GAUZE,SPONGE,4"X4",16PLY,XRAY,STRL,LF: Brand: MEDLINE

## (undated) DEVICE — ELECTRODE ELECSURG NDL 2.8 INX7.2 CM COAT INSUL EDGE

## (undated) DEVICE — SUTURE PROL SZ 3-0 L30IN NONABSORBABLE BLU L60MM KS STR REV 8622H

## (undated) DEVICE — TOWEL,OR,DSP,ST,BLUE,DLX,8/PK,10PK/CS: Brand: MEDLINE

## (undated) DEVICE — TUBE SUCT LAPSCP

## (undated) DEVICE — STANDARD HYPODERMIC NEEDLE,POLYPROPYLENE HUB: Brand: MONOJECT

## (undated) DEVICE — 3M™ STERI-STRIP™ COMPOUND BENZOIN TINCTURE 40 BAGS/CARTON 4 CARTONS/CASE C1544: Brand: 3M™ STERI-STRIP™

## (undated) DEVICE — TOWEL,STOP FLAG GOLD N-W: Brand: MEDLINE

## (undated) DEVICE — Device: Brand: DENVER SPLINT

## (undated) DEVICE — NASAL FESS: Brand: MEDLINE INDUSTRIES, INC.

## (undated) DEVICE — SUTURE CHROMIC GUT SZ 4-0 L18IN ABSRB BRN L13MM P-3 3/8 CIR 1654G

## (undated) DEVICE — GLOVE ORANGE PI 7 1/2   MSG9075

## (undated) DEVICE — ENT MINOR: Brand: MEDLINE INDUSTRIES, INC.